# Patient Record
Sex: FEMALE | Race: WHITE | NOT HISPANIC OR LATINO | Employment: OTHER | URBAN - METROPOLITAN AREA
[De-identification: names, ages, dates, MRNs, and addresses within clinical notes are randomized per-mention and may not be internally consistent; named-entity substitution may affect disease eponyms.]

---

## 2016-11-09 LAB — ABNORMAL: NORMAL

## 2017-10-22 ENCOUNTER — TRANSFERRED RECORDS (OUTPATIENT)
Dept: HEALTH INFORMATION MANAGEMENT | Facility: CLINIC | Age: 67
End: 2017-10-22

## 2017-10-25 ENCOUNTER — OFFICE VISIT (OUTPATIENT)
Dept: FAMILY MEDICINE | Facility: CLINIC | Age: 67
End: 2017-10-25

## 2017-10-25 VITALS
WEIGHT: 173 LBS | OXYGEN SATURATION: 97 % | HEIGHT: 67 IN | SYSTOLIC BLOOD PRESSURE: 125 MMHG | HEART RATE: 85 BPM | TEMPERATURE: 97.6 F | BODY MASS INDEX: 27.15 KG/M2 | DIASTOLIC BLOOD PRESSURE: 86 MMHG

## 2017-10-25 DIAGNOSIS — Z23 NEED FOR TETANUS BOOSTER: ICD-10-CM

## 2017-10-25 DIAGNOSIS — Z01.818 PRE-OP EXAM: Primary | ICD-10-CM

## 2017-10-25 PROBLEM — M17.10 PRIMARY OSTEOARTHRITIS OF KNEE: Status: ACTIVE | Noted: 2017-10-25

## 2017-10-25 PROBLEM — E04.1 THYROID NODULE: Status: ACTIVE | Noted: 2017-10-25

## 2017-10-25 PROBLEM — L71.9 ROSACEA: Status: ACTIVE | Noted: 2017-10-25

## 2017-10-25 PROBLEM — J30.2 SEASONAL ALLERGIC RHINITIS: Status: ACTIVE | Noted: 2017-10-25

## 2017-10-25 RX ORDER — LANOLIN ALCOHOL/MO/W.PET/CERES
CREAM (GRAM) TOPICAL DAILY
COMMUNITY
End: 2019-12-11

## 2017-10-25 RX ORDER — CELECOXIB 200 MG/1
CAPSULE ORAL
COMMUNITY
Start: 2017-01-25 | End: 2018-02-21

## 2017-10-25 RX ORDER — TRETINOIN 1 MG/G
CREAM TOPICAL
COMMUNITY
Start: 2017-10-11

## 2017-10-25 RX ORDER — METRONIDAZOLE 10 MG/G
GEL TOPICAL
COMMUNITY

## 2017-10-25 RX ORDER — LORATADINE 10 MG/1
10 TABLET ORAL DAILY
COMMUNITY
End: 2019-12-11

## 2017-10-25 ASSESSMENT — PAIN SCALES - GENERAL: PAINLEVEL: NO PAIN (0)

## 2017-10-25 NOTE — PROGRESS NOTES
[facility]  [address], [address]  [facility] [address]  [phone]  Dept: [phone]    PRE-OP EVALUATION:  Today's date: 10/25/2017    Yanely Jon (: 1950) presents for pre-operative evaluation assessment as requested by Dr. Chris Graham.  She requires evaluation and anesthesia risk assessment prior to undergoing surgery/procedure for treatment of bilateral foot pain. .  Proposed procedure: Bilateral bunionectomy.    Date of Surgery/ Procedure: 17  Time of Surgery/ Procedure: 12:30 p.m.  Hospital/Surgical Facility: [facility]  Fax number for surgical facility: [phone]   Primary Physician: Sherlyn Almanzar  Type of Anesthesia Anticipated: to be determined    Patient has a Health Care Directive or Living Will:  YES     1. NO - Do you have a history of heart attack, stroke, stent, bypass or surgery on an artery in the head, neck, heart or legs?  2. NO - Do you ever have any pain or discomfort in your chest?  3. NO - Do you have a history of  Heart Failure?  4. NO - Are you troubled by shortness of breath when: walking on the level, up a slight hill or at night?  5. NO - Do you currently have a cold, bronchitis or other respiratory infection?  6. NO - Do you have a cough, shortness of breath or wheezing?  7. NO - Do you sometimes get pains in the calves of your legs when you walk?  8. NO - Do you or anyone in your family have previous history of blood clots?  9. NO - Do you or does anyone in your family have a serious bleeding problem such as prolonged bleeding following surgeries or cuts?  10. NO - Have you ever had problems with anemia or been told to take iron pills?  11. NO - Have you had any abnormal blood loss such as black, tarry or bloody stools, or abnormal vaginal bleeding?  12. NO - Have you ever had a blood transfusion?  13. NO - Have you or any of your relatives ever had problems with  anesthesia?   14. NO - Do you have sleep apnea, excessive snoring or daytime drowsiness?  15. NO - Do you have any prosthetic heart valves?  16. NO - Do you have prosthetic joints?  17. NO - Is there any chance that you may be pregnant?    HPI:                                                    Preop  Yanely Jon has a history of bilateral bunions at the 1st metatarsal joints. She is having pain, unable to find shoes that fit. She is to undergo elective bunionectomy. She uses celebrex for pain but was instructed to stop this and use ES tylenol, leading up to her surgery.    She is a healthy individual, without history of heart dx,lung dx, hypertension or DM. Nonsmoker. No hx of sleep apnea. No personal or family history of bleeding or clotting disorders. No personal or family history of intolerance to anesthesia. She has not had any previous complications with surgeries.     MEDICAL HISTORY:                                                      Patient Active Problem List    Diagnosis Date Noted     Thyroid nodule 10/25/2017     Priority: Medium     Primary osteoarthritis of knee 10/25/2017     Priority: Medium     knees       Seasonal allergic rhinitis 10/25/2017     Priority: Medium     Rosacea 10/25/2017     Priority: Medium     Cervicalgia 08/19/2016     Priority: Medium     S/p cortisone injections          Past Surgical History:   Procedure Laterality Date     APPENDECTOMY       CATARACT IOL, RT/LT Right      COLONOSCOPY  2014    repeat in 10 yr     DENTAL SURGERY       MAMMOPLASTY AUGMENTATION Bilateral 1972     TONSILLECTOMY       TUBAL LIGATION       Current Outpatient Prescriptions   Medication Sig Dispense Refill     celecoxib (CELEBREX) 200 MG capsule        metroNIDAZOLE (METROGEL) 1 % gel        tretinoin (RETIN-A) 0.1 % cream Apply QHS to face for rosacea       aspirin 81 MG tablet Take by mouth daily       Omega-3 Fatty Acids (FISH OIL PO)        loratadine (CLARITIN) 10 MG tablet Take 10 mg  "by mouth daily       cyanocobalamin (VITAMIN  B-12) 1000 MCG tablet Take by mouth daily       OTC products: None, except as noted above    Allergies not on file   Latex Allergy: YES: Precautions to take: avoid latex products    Social History   Substance Use Topics     Smoking status: Never Smoker     Smokeless tobacco: Never Used     Alcohol use Yes      Comment: 0-1 per day     History   Drug Use Not on file       REVIEW OF SYSTEMS:                                                    Constitutional, neuro, ENT, endocrine, pulmonary, cardiac, gastrointestinal, genitourinary, musculoskeletal, integument and psychiatric systems are negative, except as otherwise noted.      EXAM:                                                    /86 (BP Location: Left arm, Patient Position: Chair, Cuff Size: Adult Regular)  Pulse 85  Temp 97.6  F (36.4  C) (Oral)  Ht 5' 7\" (170.2 cm)  Wt 173 lb (78.5 kg)  SpO2 97%  BMI 27.1 kg/m2    GENERAL APPEARANCE: healthy, alert and no distress     EYES: EOMI, PERRL     HENT: ear canals and TM's normal and nose and mouth without ulcers or lesions     NECK: no adenopathy, no asymmetry, masses, or scars and thyroid normal to palpation     RESP: lungs clear to auscultation - no rales, rhonchi or wheezes     CV: regular rates and rhythm, normal S1 S2,  no murmur, no bruits     ABDOMEN:  soft, nontender,  bowel sounds normal     MS: extremities normal- no gross deformities noted, no evidence of inflammation in joints, FROM in all extremities.     SKIN: no suspicious lesions or rashes     NEURO: Normal strength and tone, sensory exam grossly normal, mentation intact and speech normal     PSYCH: mentation appears normal. and affect normal/bright     EXT: no edema     Feet: thickening at first metatarsal joints, bony thickening over dorsum of both feet, no redness or warmth, pulses full/ equal    DIAGNOSTICS:                                                    EKG: appears normal, NSR, 83, " normal axis, normal intervals, no acute ST/T changes c/w ischemia, no LVH by voltage criteria, no previous for comparison. Read by Sherlyn Almanzar MD  Internal Medicine/Pediatrics    No additional labs are drawn    IMPRESSION:                                                    (Z01.818) Pre-op exam  (primary encounter diagnosis)  Comment: elective bilateral bunionectomies  Plan: EKG 12-lead complete w/read - Clinics        No contraindication to surgery, proceed as planned    (Z23) Need for tetanus booster  Comment: due for routine booster  Plan: TD (ADULT, 7+) PRESERVE FREE        Update prior to surgery    The proposed surgical procedure is considered INTERMEDIATE risk.    REVISED CARDIAC RISK INDEX  The patient has the following serious cardiovascular risks for perioperative complications such as (MI, PE, VFib and 3  AV Block):  No serious cardiac risks  INTERPRETATION: 0 risks: Class I (very low risk - 0.4% complication rate)    The patient has the following additional risks for perioperative complications:  No identified additional risks    RECOMMENDATIONS:                                                      --Patient is to take all scheduled medications on the day of surgery EXCEPT for modifications listed below.    Anticoagulant or Antiplatelet Medication Use  ASPIRIN: Discontinue ASA 7-10 days prior to procedure to reduce bleeding risk.  It should be resumed post-operatively.  NSAIDS: Celecoxib (Celebrex):    Stop 2-3 days prior to surgery  NO herbals, vitamins, or supplements        APPROVAL GIVEN to proceed with proposed procedure, without further diagnostic evaluation       Signed Electronically by: Sherlyn Almanzar MD    Copy of this evaluation report is provided to requesting physician.    [facility] Preop Guidelines

## 2017-10-25 NOTE — NURSING NOTE
"67 year old  Chief Complaint   Patient presents with     [facility]     Pre Op Exam     11/8/17 with bilateral feet surgery with Dr. Chris Graham at [facility].       Blood pressure 125/86, pulse 85, temperature 97.6  F (36.4  C), temperature source Oral, height 5' 7\" (170.2 cm), weight 173 lb (78.5 kg), SpO2 97 %. Body mass index is 27.1 kg/(m^2).  Patient Active Problem List   Diagnosis   (none) - all problems resolved or deleted       Wt Readings from Last 2 Encounters:   10/25/17 173 lb (78.5 kg)     BP Readings from Last 3 Encounters:   10/25/17 125/86         Current Outpatient Prescriptions   Medication     celecoxib (CELEBREX) 200 MG capsule     metroNIDAZOLE (METROGEL) 1 % gel     tretinoin (RETIN-A) 0.1 % cream     aspirin 81 MG tablet     Omega-3 Fatty Acids (FISH OIL PO)     loratadine (CLARITIN) 10 MG tablet     cyanocobalamin (VITAMIN  B-12) 1000 MCG tablet     No current facility-administered medications for this visit.        Social History   Substance Use Topics     Smoking status: Never Smoker     Smokeless tobacco: Never Used     Alcohol use Yes       Health Maintenance Due   Topic Date Due     TETANUS IMMUNIZATION (SYSTEM ASSIGNED)  05/25/1966     HEPATITIS C SCREENING  05/25/1966     LIPID SCREEN Q5 YR FEMALE (SYSTEM ASSIGNED)  05/25/1993     COLON CANCER SCREEN (SYSTEM ASSIGNED)  05/25/1998     ADVANCE DIRECTIVE PLANNING Q5 YRS  05/25/2003     FALL RISK ASSESSMENT  05/25/2013     DEXA SCAN SCREENING (SYSTEM ASSIGNED)  05/25/2013     PNEUMOCOCCAL (1 of 2 - PCV13) 05/25/2013     INFLUENZA VACCINE (SYSTEM ASSIGNED)  09/01/2017       No results found for: DERICK Foley CMA  October 25, 2017 9:18 AM    Screening Questionnaire for Adult Immunization    Are you sick today?   No   Do you have allergies to medications, food, a vaccine component or latex?   No   Have you ever had a serious reaction after receiving a vaccination?   No   Do you have a long-term health problem with " heart disease, lung disease, asthma, kidney disease, metabolic disease (e.g. diabetes), anemia, or other blood disorder?   No   Do you have cancer, leukemia, HIV/AIDS, or any other immune system problem?   No   In the past 3 months, have you taken medications that affect  your immune system, such as prednisone, other steroids, or anticancer drugs; drugs for the treatment of rheumatoid arthritis, Crohn s disease, or psoriasis; or have you had radiation treatments?   No   Have you had a seizure, or a brain or other nervous system problem?   No   During the past year, have you received a transfusion of blood or blood     products, or been given immune (gamma) globulin or antiviral drug?   No   For women: Are you pregnant or is there a chance you could become        pregnant during the next month?   No   Have you received any vaccinations in the past 4 weeks?   No     Immunization questionnaire answers were all negative.        Per orders of Dr. Almanzar, injection of TD given by Charline Foley. Patient instructed to remain in clinic for 15 minutes afterwards, and to report any adverse reaction to me immediately.       Screening performed by Charline Foley on 10/25/2017 at 10:32 AM.

## 2017-10-25 NOTE — PATIENT INSTRUCTIONS
Hold any vitamin or supplement  No aspirin, ibuprofen, excederin, aleve or celebrex    Tylenol 500mg tablets  Take 2 tablets 3 times a day if needed for pain    High fiber cereal /diet after surgery  Miralax powder, use daily if taking narcotic for pain. (stool softener).

## 2017-10-25 NOTE — MR AVS SNAPSHOT
After Visit Summary   10/25/2017    Yanely Jon    MRN: [id number]           Patient Information     Date Of Birth          1950        Visit Information        Provider Department      10/25/2017 9:20 AM Sherlyn Almanzar MD [facility]        Today's Diagnoses     Pre-op exam    -  1    Need for tetanus booster          Care Instructions    Hold any vitamin or supplement  No aspirin, ibuprofen, excederin, aleve or celebrex    Tylenol 500mg tablets  Take 2 tablets 3 times a day if needed for pain    High fiber cereal /diet after surgery  Miralax powder, use daily if taking narcotic for pain. (stool softener).          Follow-ups after your visit        Who to contact     Please call your clinic at [phone] to:    Ask questions about your health    Make or cancel appointments    Discuss your medicines    Learn about your test results    Speak to your doctor   If you have compliments or concerns about an experience at your clinic, or if you wish to file a complaint, please contact [facility] Patient Relations at [phone] or email us at [email or web address]         Additional Information About Your Visit        MyChart Information     [employer] is an electronic gateway that provides easy, online access to your medical records. With [employer] you can request a clinic appointment, read your test results, renew a prescription or communicate with your care team.     To sign up for [employer] visit the website at [email or web address]   You will be asked to enter the access code listed below, as well as some personal information. Please follow the directions to create your username and password.     Your access code is: [email or web address]  Expires: 2018 10:06 AM     Your access code will  in 90 days. If you need help or a new code, please contact your [facility] or call [phone] for assistance.       "  Care EveryWhere ID     This is your Care EveryWhere ID. This could be used by other organizations to access your [facility] medical records  [id number]        Your Vitals Were     Pulse Temperature Height Pulse Oximetry BMI (Body Mass Index)       85 97.6  F (36.4  C) (Oral) 5' 7\" (170.2 cm) 97% 27.1 kg/m2        Blood Pressure from Last 3 Encounters:   10/25/17 125/86    Weight from Last 3 Encounters:   10/25/17 173 lb (78.5 kg)              We Performed the Following     EKG 12-lead complete w/read - Clinics     TD (ADULT, 7+) PRESERVE FREE        Primary Care Provider Office Phone # Fax #    Sherlyn Almanzar [phone]       [address] [place] [id number]        Equal Access to Services     [facility]: Hadii aad ku hadasho Socoy, waaxda luqadaha, qaybta kaalmada adehollieyadonovan, jayce borden . So [facility] [phone].    ATENCIÓN: Si habla español, tiene a norton disposición servicios gratuitos de asistencia lingüística. Adwoa al [phone].    We comply with applicable federal civil rights laws and [place] laws. We do not discriminate on the basis of race, color, national origin, age, disability, sex, sexual orientation, or gender identity.            Thank you!     Thank you for choosing [facility]  for your care. Our goal is always to provide you with excellent care. Hearing back from our patients is one way we can continue to improve our services. Please take a few minutes to complete the written survey that you may receive in the mail after your visit with us. Thank you!             Your Updated Medication List - Protect others around you: Learn how to safely use, store and throw away your medicines at www.disposemymeds.org.          This list is accurate as of: 10/25/17 10:06 AM.  Always use your most recent med list.                   Brand Name Dispense Instructions for use Diagnosis    aspirin 81 MG tablet      Take by mouth daily        " celecoxib 200 MG capsule    celeBREX          CLARITIN 10 MG tablet   Generic drug:  loratadine      Take 10 mg by mouth daily        cyanocobalamin 1000 MCG tablet    vitamin  B-12     Take by mouth daily        FISH OIL PO           metroNIDAZOLE 1 % gel    METROGEL          tretinoin 0.1 % cream    RETIN-A     Apply QHS to face for rosacea

## 2018-02-19 NOTE — PATIENT INSTRUCTIONS
Benefiber daily     [facility]  [phone]    Call regarding your prescription for estrogen cream      Preventive Health Recommendations  Female Ages 65 +    Yearly exam:     See your health care provider every year in order to  o Review health changes.   o Discuss preventive care.    o Review your medicines if your doctor has prescribed any.      You no longer need a yearly Pap test unless you've had an abnormal Pap test in the past 10 years. If you have vaginal symptoms, such as bleeding or discharge, be sure to talk with your provider about a Pap test.      Every 1 to 2 years, have a mammogram.  If you are over 69, talk with your health care provider about whether or not you want to continue having screening mammograms.      Every 10 years, have a colonoscopy. Or, have a yearly FIT test (stool test). These exams will check for colon cancer.       Have a cholesterol test every 5 years, or more often if your doctor advises it.       Have a diabetes test (fasting glucose) every three years. If you are at risk for diabetes, you should have this test more often.       At age 65, have a bone density scan (DEXA) to check for osteoporosis (brittle bone disease).    Shots:    Get a flu shot each year.    Get a tetanus shot every 10 years.    Talk to your doctor about your pneumonia vaccines. There are now two you should receive - Pneumovax (PPSV 23) and Prevnar (PCV 13).    Talk to your doctor about the shingles vaccine.    Talk to your doctor about the hepatitis B vaccine.    Nutrition:     Eat at least 5 servings of fruits and vegetables each day.      Eat whole-grain bread, whole-wheat pasta and brown rice instead of white grains and rice.      Talk to your provider about Calcium and Vitamin D.     Lifestyle    Exercise at least 150 minutes a week (30 minutes a day, 5 days a week). This will help you control your weight and prevent disease.      Limit alcohol to one drink per day.      No  smoking.       Wear sunscreen to prevent skin cancer.       See your dentist twice a year for an exam and cleaning.      See your eye doctor every 1 to 2 years to screen for conditions such as glaucoma, macular degeneration, cataracts, etc

## 2018-02-19 NOTE — PROGRESS NOTES
Yanely Jon is here for a general check up. She is fasting. She is up to date on eye exams and dental visits. Wears seat belt-yes. Bike helmet- yes.   Concerns today:    BRIAN Chamorro is here for general check up. She is due for routine Hep C screening. Her last colonoscopy was 2014, normal, in [place]  Last DEXA 2013, (In [place])-she is due now. Mammogram, last done 11/9/16. Scattered fibroglandular changes. No suspicious findings.     Immunizations are up to date.     Diet: healthy, veggies, salads, chicken and fish, dairy    Advance directive: Yes, on file  Hearing concerns: No  Fall Risk: None  Independent at home: Yes  Safe : Yes  Memory concerns: No    COGNITIVE SCREEN  1) Repeat 3 items (Banana, Sunrise, Chair)    2) Clock draw: NORMAL  3) 3 item recall: Recalls 3 objects  Results: 3 items recalled: COGNITIVE IMPAIRMENT LESS LIKELY    Mini-CogTM Copyright S [place]. Licensed by the author for use in [facility]; reprinted with permission (emely@[facility]). All rights reserved.      Dysphagia  Sometimes chicken gets stuck in her throat. Will try omeprazole, no belly pain, no real burping. Discussed trial of omeprazole x 6 wks and if sx continue then obtain EGD. She is on celebrex, denies stomach upset.    Osteoarthritis  Uses Celebrex for knee arthritis. She has had cortisone injections. Now, sees [facility], Dr. Mosher for Synvisc injections. Right knee cap dislocate. Takes Celebex 200mg daily. Doing well with that. Sometimes her left wrist hurts. She had cervical disc disease and had an injection last year which helped.          Health Maintenance   Topic Date Due     HEPATITIS C SCREENING  05/25/1966     LIPID SCREEN Q5 YR FEMALE (SYSTEM ASSIGNED)  05/25/1993     COLON CANCER SCREEN (SYSTEM ASSIGNED)  05/25/1998     ADVANCE DIRECTIVE PLANNING Q5 YRS  05/25/2003     DEXA SCAN SCREENING (SYSTEM ASSIGNED)  05/25/2013     FALL RISK ASSESSMENT  10/25/2018     MAMMO SCREEN Q2 YR (SYSTEM ASSIGNED)   11/09/2018     TETANUS IMMUNIZATION (SYSTEM ASSIGNED)  10/25/2027     INFLUENZA VACCINE (SYSTEM ASSIGNED)  Completed     PNEUMOCOCCAL  Completed         Patient Active Problem List   Diagnosis     Cervicalgia     Thyroid nodule     Primary osteoarthritis of knee     Seasonal allergic rhinitis     Rosacea       Past Surgical History:   Procedure Laterality Date     APPENDECTOMY       BUNIONECTOMY Bilateral 10/2017     CATARACT IOL, RT/LT Right      COLONOSCOPY  2014    repeat in 10 yr     DENTAL SURGERY       MAMMOPLASTY AUGMENTATION Bilateral 1972     TONSILLECTOMY       TUBAL LIGATION         Family History   Problem Relation Age of Onset     Osteoarthritis Mother      CEREBROVASCULAR DISEASE Mother 95     OSTEOPOROSIS Mother      Bladder Cancer Father      Breast Cancer Sister 53     HEART DISEASE Brother 50     stents, was smoker     Hyperlipidemia Maternal Aunt      Hyperlipidemia Maternal Aunt        Social    3 children, 9 grandchildren  Retired [employer]    HABITS:  Tob: never  ETOH: 1-2 per week  Calcium: 3-4 per day  Caffeine: 1-2 per day  Exercise: regular program    OB/GYN HISTORY:  LMP: menopause age 38. Had bleeding from fibroids, placed on OCP until menopause  Sexually active, reports vaginal dryness, no bleeding  Hx abnormal pap?  none  STD hx?  none  cycle length:  none  dysmenorrhea/PMS:  none  Vasomotor sx:  none  Contraception: na  G 4   P 3   A 1  Self Breast exam:  yes    Current Outpatient Prescriptions   Medication Sig Dispense Refill     celecoxib (CELEBREX) 200 MG capsule        metroNIDAZOLE (METROGEL) 1 % gel        tretinoin (RETIN-A) 0.1 % cream Apply QHS to face for rosacea       aspirin 81 MG tablet Take by mouth daily       Omega-3 Fatty Acids (FISH OIL PO)        loratadine (CLARITIN) 10 MG tablet Take 10 mg by mouth daily       cyanocobalamin (VITAMIN  B-12) 1000 MCG tablet Take by mouth daily       Allergies   Allergen Reactions     Fentanyl Other (See Comments)     May  "have caused pt. To pass out after cataract      Latex Dermatitis     Methohexital Other (See Comments)     May have caused pt. To pass out after cataract      Midazolam Other (See Comments)     May have caused pt. To pass out after cataract surgery     Seasonal Allergies          ROS  CONSTITUTIONAL:NEGATIVE for fever, chills, change in weight  INTEGUMENTARY/SKIN: NEGATIVE for worrisome rashes, moles or lesions, hx of rosacea, sees dermatologist  EYES: NEGATIVE for vision changes or irritation  ENT/MOUTH: NEGATIVE for ear, mouth and throat problems  RESP:NEGATIVE for significant cough or SOB  BREAST: NEGATIVE for masses, tenderness or discharge, hx of bilateral implants  CV: NEGATIVE for chest pain, palpitations, CERVANTES, orthopnea, PND  or peripheral edema  GI: NEGATIVE for nausea, abdominal pain, heartburn, or change in bowel habits, mild constipation  :NEGATIVE for frequency, dysuria, or hematuria  GYN: vaginal dryness, wishes to try estrogen cream, no vaginal bleeding  MUSCULOSKELETAL: hx of bilateral knee pain, neck pain, wrist pain, on Celebrex  NEURO: NEGATIVE for weakness, dizziness or paresthesias  ENDOCRINE: NEGATIVE for polyuria/dipsia,  temperature intolerance, skin/hair changes  HEME/ALLERGY/IMMUNE: NEGATIVE for bleeding problems  PSYCHIATRIC: NEGATIVE for changes in mood or affect    EXAM  /83 (BP Location: Right arm, Patient Position: Chair, Cuff Size: Adult Regular)  Pulse 92  Temp 97.6  F (36.4  C) (Temporal)  Ht 5' 6.77\" (169.6 cm)  Wt 170 lb (77.1 kg)  SpO2 99%  BMI 26.81 kg/m2  GENERAL APPEARANCE: Alert, pleasant, NAD  EYES: PERRL, EOMI, conjunctiva clear  HENT: TM normal bilaterally. Nose and mouth without lesions  NECK: no adenopathy, thyroid normal to palpation  RESP: lungs clear to auscultation bilaterally,  BREAST: bilateral implants intact, overlying skin normal. No palpable masses, no axillary masses or adenopathy   CV: regular rate and rhythm, normal S1 S2, no murmur, no carotid " bruits  ABDOMEN: soft, nontender, without HSM or masses. Bowel sounds normal  : Not done  RECTAL EXAM: not done   MS: extremities normal- no gross deformities noted, no tender, hot or swollen joints.    SKIN: no suspicious lesions or rashes, fair skinned, freckling  NEURO: Normal strength and tone, sensory exam grossly normal, DTR normoreflexive in upper and lower extremities  PSYCH: mentation appears normal. and affect normal/bright.  EXT: no peripheral edema, pedal pulses palpable    Assessment:  (Z00.00) Routine general medical examination at a health care facility  (primary encounter diagnosis)  Comment: healthy 68 yo woman  Plan: Lipid panel reflex to direct LDL Fasting,         Vitamin D Deficiency, CBC with platelets        Anticipatory guidance given today regarding diet, exercise, calcium intake.      (Z11.59) Need for hepatitis C screening test  Comment: due for screening  Plan: Hepatitis C Screen Reflex to HCV RNA Quant and         Genotype            (M15.8) Other osteoarthritis involving multiple joints  Comment: she sees orthopedic surgeon for synvisc, stable on medicatoin  Plan: celecoxib (CELEBREX) 200 MG capsule        Take with food to reduce stomach upset    (R13.10) Dysphagia, unspecified type  Comment: new onset, denies abdominal pain or GERD  Plan: omeprazole (PRILOSEC) 20 MG CR capsule        Trial of omeprazole daily x 6 wks. If symptoms persist then refer for EGD    (N95.2) Atrophic vaginitis  Comment: she is sexually active, wishes to try estrogen cream compounded, stopped using it due to cost  Plan: ESTRIOL 0.5MG/GM CREAM, ESTRIOL 0.5MG/GM CREAM        rx sent to pharmacy    (Z13.720) Screening for osteoporosis  Comment: due for routine DEXA  Plan: DEXA HIP/PELVIS/SPINE - Future        Discussed calcium and D intake    Sherlyn Almanzar MD  Internal Medicine/Pediatrics

## 2018-02-21 ENCOUNTER — OFFICE VISIT (OUTPATIENT)
Dept: FAMILY MEDICINE | Facility: CLINIC | Age: 68
End: 2018-02-21
Payer: COMMERCIAL

## 2018-02-21 VITALS
TEMPERATURE: 97.6 F | DIASTOLIC BLOOD PRESSURE: 83 MMHG | OXYGEN SATURATION: 99 % | WEIGHT: 170 LBS | SYSTOLIC BLOOD PRESSURE: 126 MMHG | BODY MASS INDEX: 26.68 KG/M2 | HEART RATE: 92 BPM | HEIGHT: 67 IN

## 2018-02-21 DIAGNOSIS — Z00.00 ROUTINE GENERAL MEDICAL EXAMINATION AT A HEALTH CARE FACILITY: Primary | ICD-10-CM

## 2018-02-21 DIAGNOSIS — Z13.820 SCREENING FOR OSTEOPOROSIS: ICD-10-CM

## 2018-02-21 DIAGNOSIS — R13.10 DYSPHAGIA, UNSPECIFIED TYPE: ICD-10-CM

## 2018-02-21 DIAGNOSIS — M15.8 OTHER OSTEOARTHRITIS INVOLVING MULTIPLE JOINTS: ICD-10-CM

## 2018-02-21 DIAGNOSIS — N95.2 ATROPHIC VAGINITIS: ICD-10-CM

## 2018-02-21 DIAGNOSIS — Z11.59 NEED FOR HEPATITIS C SCREENING TEST: ICD-10-CM

## 2018-02-21 LAB
CHOLEST SERPL-MCNC: 230 MG/DL
ERYTHROCYTE [DISTWIDTH] IN BLOOD BY AUTOMATED COUNT: 13.6 % (ref 10–15)
HCT VFR BLD AUTO: 44.4 % (ref 35–47)
HDLC SERPL-MCNC: 80 MG/DL
HGB BLD-MCNC: 14.3 G/DL (ref 11.7–15.7)
LDLC SERPL CALC-MCNC: 139 MG/DL
MCH RBC QN AUTO: 32.1 PG (ref 26.5–33)
MCHC RBC AUTO-ENTMCNC: 32.2 G/DL (ref 31.5–36.5)
MCV RBC AUTO: 100 FL (ref 78–100)
NONHDLC SERPL-MCNC: 150 MG/DL
PLATELET # BLD AUTO: 223 10E9/L (ref 150–450)
RBC # BLD AUTO: 4.45 10E12/L (ref 3.8–5.2)
TRIGL SERPL-MCNC: 50 MG/DL
WBC # BLD AUTO: 5 10E9/L (ref 4–11)

## 2018-02-21 RX ORDER — CELECOXIB 200 MG/1
200 CAPSULE ORAL DAILY
Qty: 90 CAPSULE | Refills: 3 | Status: SHIPPED | OUTPATIENT
Start: 2018-02-21 | End: 2019-02-05

## 2018-02-21 ASSESSMENT — PAIN SCALES - GENERAL: PAINLEVEL: NO PAIN (0)

## 2018-02-21 NOTE — NURSING NOTE
"67 year old  Chief Complaint   Patient presents with     Physical     Annual physical.       Blood pressure 126/83, pulse 92, temperature 97.6  F (36.4  C), temperature source Temporal, height 5' 6.77\" (169.6 cm), weight 170 lb (77.1 kg), SpO2 99 %. Body mass index is 26.81 kg/(m^2).  Patient Active Problem List   Diagnosis     Cervicalgia     Thyroid nodule     Primary osteoarthritis of knee     Seasonal allergic rhinitis     Rosacea       Wt Readings from Last 2 Encounters:   02/21/18 170 lb (77.1 kg)   10/25/17 173 lb (78.5 kg)     BP Readings from Last 3 Encounters:   02/21/18 126/83   10/25/17 125/86         Current Outpatient Prescriptions   Medication     celecoxib (CELEBREX) 200 MG capsule     metroNIDAZOLE (METROGEL) 1 % gel     tretinoin (RETIN-A) 0.1 % cream     aspirin 81 MG tablet     Omega-3 Fatty Acids (FISH OIL PO)     loratadine (CLARITIN) 10 MG tablet     cyanocobalamin (VITAMIN  B-12) 1000 MCG tablet     No current facility-administered medications for this visit.        Social History   Substance Use Topics     Smoking status: Never Smoker     Smokeless tobacco: Never Used     Alcohol use Yes      Comment: 0-1 per day       Health Maintenance Due   Topic Date Due     HEPATITIS C SCREENING  05/25/1966     LIPID SCREEN Q5 YR FEMALE (SYSTEM ASSIGNED)  05/25/1993     COLON CANCER SCREEN (SYSTEM ASSIGNED)  05/25/1998     ADVANCE DIRECTIVE PLANNING Q5 YRS  05/25/2003     DEXA SCAN SCREENING (SYSTEM ASSIGNED)  05/25/2013       No results found for: DERICK Zhang MA  February 21, 2018 7:58 AM    "

## 2018-02-21 NOTE — MR AVS SNAPSHOT
After Visit Summary   2/21/2018    Yanely Jon    MRN: [id number]           Patient Information     Date Of Birth          1950        Visit Information        Provider Department      2/21/2018 8:00 AM Sherlyn Almanzar MD [facility]        Today's Diagnoses     Routine general medical examination at a health care facility    -  1    Asymptomatic postmenopausal status        Need for hepatitis C screening test        Other osteoarthritis involving multiple joints        Dysphagia, unspecified type          Care Instructions    Benefiber daily     [facility]  [facility]  [phone]    Call regarding your prescription for estrogen cream      Preventive Health Recommendations  Female Ages 65 +    Yearly exam:     See your health care provider every year in order to  o Review health changes.   o Discuss preventive care.    o Review your medicines if your doctor has prescribed any.      You no longer need a yearly Pap test unless you've had an abnormal Pap test in the past 10 years. If you have vaginal symptoms, such as bleeding or discharge, be sure to talk with your provider about a Pap test.      Every 1 to 2 years, have a mammogram.  If you are over 69, talk with your health care provider about whether or not you want to continue having screening mammograms.      Every 10 years, have a colonoscopy. Or, have a yearly FIT test (stool test). These exams will check for colon cancer.       Have a cholesterol test every 5 years, or more often if your doctor advises it.       Have a diabetes test (fasting glucose) every three years. If you are at risk for diabetes, you should have this test more often.       At age 65, have a bone density scan (DEXA) to check for osteoporosis (brittle bone disease).    Shots:    Get a flu shot each year.    Get a tetanus shot every 10 years.    Talk to your doctor about your pneumonia vaccines. There are now two you should receive -  Pneumovax (PPSV 23) and Prevnar (PCV 13).    Talk to your doctor about the shingles vaccine.    Talk to your doctor about the hepatitis B vaccine.    Nutrition:     Eat at least 5 servings of fruits and vegetables each day.      Eat whole-grain bread, whole-wheat pasta and brown rice instead of white grains and rice.      Talk to your provider about Calcium and Vitamin D.     Lifestyle    Exercise at least 150 minutes a week (30 minutes a day, 5 days a week). This will help you control your weight and prevent disease.      Limit alcohol to one drink per day.      No smoking.       Wear sunscreen to prevent skin cancer.       See your dentist twice a year for an exam and cleaning.      See your eye doctor every 1 to 2 years to screen for conditions such as glaucoma, macular degeneration, cataracts, etc           Follow-ups after your visit        Future tests that were ordered for you today     Open Future Orders        Priority Expected Expires Ordered    DEXA HIP/PELVIS/SPINE - Future Routine  2019            Who to contact     Please call your clinic at [phone] to:    Ask questions about your health    Make or cancel appointments    Discuss your medicines    Learn about your test results    Speak to your doctor            Additional Information About Your Visit        Cardiac Systemzhart Information     BackerKitt is an electronic gateway that provides easy, online access to your medical records. With [email or web address], you can request a clinic appointment, read your test results, renew a prescription or communicate with your care team.     To sign up for BackerKitt visit the website at [email or web address]   You will be asked to enter the access code listed below, as well as some personal information. Please follow the directions to create your username and password.     Your access code is: [email or web address]  Expires: 2018  8:49 AM     Your access code will  in 90 days. If you need help or a new code,  "please contact your [facility] or call [phone] for assistance.        Care EveryWhere ID     This is your Care EveryWhere ID. This could be used by other organizations to access your [facility] medical records  [id number]        Your Vitals Were     Pulse Temperature Height Pulse Oximetry BMI (Body Mass Index)       92 97.6  F (36.4  C) (Temporal) 5' 6.77\" (169.6 cm) 99% 26.81 kg/m2        Blood Pressure from Last 3 Encounters:   02/21/18 126/83   10/25/17 125/86    Weight from Last 3 Encounters:   02/21/18 170 lb (77.1 kg)   10/25/17 173 lb (78.5 kg)              We Performed the Following     CBC with platelets     Hepatitis C Screen Reflex to HCV RNA Quant and Genotype     Lipid panel reflex to direct LDL Fasting     Vitamin D Deficiency          Today's Medication Changes          These changes are accurate as of 2/21/18  8:49 AM.  If you have any questions, ask your nurse or doctor.               Start taking these medicines.        Dose/Directions    omeprazole 20 MG CR capsule   Commonly known as:  priLOSEC   Used for:  Dysphagia, unspecified type   Started by:  Sherlyn Almanzar MD        Dose:  20 mg   Take 1 capsule (20 mg) by mouth daily   Quantity:  90 capsule   Refills:  0         These medicines have changed or have updated prescriptions.        Dose/Directions    celecoxib 200 MG capsule   Commonly known as:  celeBREX   This may have changed:    - how much to take  - how to take this  - when to take this   Used for:  Other osteoarthritis involving multiple joints   Changed by:  Sherlyn Almanzar MD        Dose:  200 mg   Take 1 capsule (200 mg) by mouth daily   Quantity:  90 capsule   Refills:  3            Where to get your medicines      These medications were sent to [facility] - [place], [place] - [address]  [address] [facility] [address]     Phone:  [phone]     celecoxib 200 MG capsule    omeprazole 20 MG CR " capsule                Primary Care Provider Office Phone # Fax #    Sherlyn Almanzar [phone] [address] [place] [address]        Equal Access to Services     EILEEN GARCIA : Hadii aad ku hadgeorge Clement, wajacintoda luqadaha, qaybta kahemanthda campbell, jayce estrella michi potter. So [facility] [phone].    ATENCIÓN: Si habla español, tiene a norton disposición servicios gratuitos de asistencia lingüística. Llame al [phone].    We comply with applicable federal civil rights laws and [place] laws. We do not discriminate on the basis of race, color, national origin, age, disability, sex, sexual orientation, or gender identity.            Thank you!     Thank you for choosing [facility]  for your care. Our goal is always to provide you with excellent care. Hearing back from our patients is one way we can continue to improve our services. Please take a few minutes to complete the written survey that you may receive in the mail after your visit with us. Thank you!             Your Updated Medication List - Protect others around you: Learn how to safely use, store and throw away your medicines at www.disposemymeds.org.          This list is accurate as of 2/21/18  8:49 AM.  Always use your most recent med list.                   Brand Name Dispense Instructions for use Diagnosis    aspirin 81 MG tablet      Take by mouth daily        celecoxib 200 MG capsule    celeBREX    90 capsule    Take 1 capsule (200 mg) by mouth daily    Other osteoarthritis involving multiple joints       CLARITIN 10 MG tablet   Generic drug:  loratadine      Take 10 mg by mouth daily        cyanocobalamin 1000 MCG tablet    vitamin  B-12     Take by mouth daily        FISH OIL PO           metroNIDAZOLE 1 % gel    METROGEL          omeprazole 20 MG CR capsule    priLOSEC    90 capsule    Take 1 capsule (20 mg) by mouth daily    Dysphagia, unspecified type       tretinoin 0.1 % cream     RETIN-A     Apply QHS to face for rosacea

## 2018-02-22 LAB
DEPRECATED CALCIDIOL+CALCIFEROL SERPL-MC: 41 UG/L (ref 20–75)
HCV AB SERPL QL IA: NONREACTIVE

## 2018-03-21 DIAGNOSIS — I83.893 SYMPTOMATIC VARICOSE VEINS OF BOTH LOWER EXTREMITIES: ICD-10-CM

## 2018-05-21 DIAGNOSIS — R13.10 DYSPHAGIA, UNSPECIFIED TYPE: ICD-10-CM

## 2018-05-21 NOTE — TELEPHONE ENCOUNTER
Prescription approved per [facility] Refill Protocol.      Yoanna El RN  May 21, 2018 11:51 AM

## 2018-08-09 ENCOUNTER — TRANSFERRED RECORDS (OUTPATIENT)
Dept: HEALTH INFORMATION MANAGEMENT | Facility: CLINIC | Age: 68
End: 2018-08-09

## 2018-08-16 ENCOUNTER — TRANSFERRED RECORDS (OUTPATIENT)
Dept: HEALTH INFORMATION MANAGEMENT | Facility: CLINIC | Age: 68
End: 2018-08-16

## 2018-08-20 ENCOUNTER — TRANSFERRED RECORDS (OUTPATIENT)
Dept: HEALTH INFORMATION MANAGEMENT | Facility: CLINIC | Age: 68
End: 2018-08-20

## 2018-09-05 ENCOUNTER — TRANSFERRED RECORDS (OUTPATIENT)
Dept: HEALTH INFORMATION MANAGEMENT | Facility: CLINIC | Age: 68
End: 2018-09-05

## 2018-09-12 ENCOUNTER — ALLIED HEALTH/NURSE VISIT (OUTPATIENT)
Dept: FAMILY MEDICINE | Facility: CLINIC | Age: 68
End: 2018-09-12
Payer: COMMERCIAL

## 2018-09-12 DIAGNOSIS — Z23 IMMUNIZATION DUE: Primary | ICD-10-CM

## 2018-09-12 NOTE — MR AVS SNAPSHOT
After Visit Summary   9/12/2018    Yanely Jon    MRN: [id number]           Patient Information     Date Of Birth          1950        Visit Information        Provider Department      9/12/2018 2:45 PM Nurse, [facility]        Today's Diagnoses     Immunization due    -  1       Follow-ups after your visit        Who to contact     Please call your clinic at [phone] to:    Ask questions about your health    Make or cancel appointments    Discuss your medicines    Learn about your test results    Speak to your doctor            Additional Information About Your Visit        MyChart Information     Apisphere gives you secure access to your electronic health record. If you see a primary care provider, you can also send messages to your care team and make appointments. If you have questions, please call your primary care clinic.  If you do not have a primary care provider, please call [phone] and they will assist you.      Apisphere is an electronic gateway that provides easy, online access to your medical records. With Apisphere, you can request a clinic appointment, read your test results, renew a prescription or communicate with your care team.     To access your existing account, please contact your [facility] or call [phone] for assistance.        Care EveryWhere ID     This is your Care EveryWhere ID. This could be used by other organizations to access your [facility] medical records  [id number]         Blood Pressure from Last 3 Encounters:   02/21/18 126/83   10/25/17 125/86    Weight from Last 3 Encounters:   02/21/18 170 lb (77.1 kg)   10/25/17 173 lb (78.5 kg)              We Performed the Following     HEPA VACCINE ADULT IM     VACCINE ADMINISTRATION, INITIAL        Primary Care Provider Office Phone # Fax #    Sherlyn Almanzar [phone] [address] [place] [address]        Equal Access  to Services     COLLETTE GARCIA : Hadii andres Clement, wamatthew bass, qajosephed kaalmajayce cordero. So [facility] [phone].    ATENCIÓN: Si habla ajit, tiene a norton disposición servicios gratuitos de asistencia lingüística. Llame al [phone].    We comply with applicable federal civil rights laws and [place] laws. We do not discriminate on the basis of race, color, national origin, age, disability, sex, sexual orientation, or gender identity.            Thank you!     Thank you for choosing [facility]  for your care. Our goal is always to provide you with excellent care. Hearing back from our patients is one way we can continue to improve our services. Please take a few minutes to complete the written survey that you may receive in the mail after your visit with us. Thank you!             Your Updated Medication List - Protect others around you: Learn how to safely use, store and throw away your medicines at www.disposemymeds.org.          This list is accurate as of 9/12/18  3:31 PM.  Always use your most recent med list.                   Brand Name Dispense Instructions for use Diagnosis    aspirin 81 MG tablet      Take by mouth daily        celecoxib 200 MG capsule    celeBREX    90 capsule    Take 1 capsule (200 mg) by mouth daily    Other osteoarthritis involving multiple joints       CLARITIN 10 MG tablet   Generic drug:  loratadine      Take 10 mg by mouth daily        cyanocobalamin 1000 MCG tablet    vitamin  B-12     Take by mouth daily        * ESTRIOL 0.5MG/GM CREAM     30 g    Place vaginally three times a week Insert 1 gram vaginally 2-3 times per week.    Atrophic vaginitis       * ESTRIOL 0.5MG/GM CREAM     30 g    Insert 1 gram vaginally daily x 7 days, then 3 times a week    Atrophic vaginitis       FISH OIL PO           metroNIDAZOLE 1 % gel    METROGEL          omeprazole 20 MG CR capsule    priLOSEC    90 capsule    Take 1 capsule (20  mg) by mouth daily    Dysphagia, unspecified type       tretinoin 0.1 % cream    RETIN-A     Apply QHS to face for rosacea        * Notice:  This list has 2 medication(s) that are the same as other medications prescribed for you. Read the directions carefully, and ask your doctor or other care provider to review them with you.

## 2018-09-12 NOTE — NURSING NOTE
Screening Questionnaire for Adult Immunization    Are you sick today?   No   Do you have allergies to medications, food, a vaccine component or latex?   No   Have you ever had a serious reaction after receiving a vaccination?   No   Do you have a long-term health problem with heart disease, lung disease, asthma, kidney disease, metabolic disease (e.g. diabetes), anemia, or other blood disorder?   No   Do you have cancer, leukemia, HIV/AIDS, or any other immune system problem?   No   In the past 3 months, have you taken medications that affect  your immune system, such as prednisone, other steroids, or anticancer drugs; drugs for the treatment of rheumatoid arthritis, Crohn s disease, or psoriasis; or have you had radiation treatments?   No   Have you had a seizure, or a brain or other nervous system problem?   No   During the past year, have you received a transfusion of blood or blood     products, or been given immune (gamma) globulin or antiviral drug?   No   For women: Are you pregnant or is there a chance you could become        pregnant during the next month?   No   Have you received any vaccinations in the past 4 weeks?   No     Immunization questionnaire answers were all negative.        Per orders of Dr. Márquez, injection of Hep A given by Julissa Zhang. Patient instructed to remain in clinic for 15 minutes afterwards, and to report any adverse reaction to me immediately.       Screening performed by Julissa Zhang on 9/12/2018 at 3:31 PM.

## 2018-09-18 ENCOUNTER — TRANSFERRED RECORDS (OUTPATIENT)
Dept: HEALTH INFORMATION MANAGEMENT | Facility: CLINIC | Age: 68
End: 2018-09-18

## 2018-10-28 ENCOUNTER — HOSPITAL ENCOUNTER (EMERGENCY)
Facility: CLINIC | Age: 68
Discharge: HOME | End: 2018-10-28
Attending: EMERGENCY MEDICINE | Admitting: EMERGENCY MEDICINE
Payer: MEDICARE

## 2018-10-28 VITALS
SYSTOLIC BLOOD PRESSURE: 129 MMHG | RESPIRATION RATE: 21 BRPM | OXYGEN SATURATION: 99 % | TEMPERATURE: 97.7 F | DIASTOLIC BLOOD PRESSURE: 81 MMHG

## 2018-10-28 DIAGNOSIS — R55 SYNCOPE, UNSPECIFIED SYNCOPE TYPE: ICD-10-CM

## 2018-10-28 LAB
ANION GAP SERPL CALCULATED.3IONS-SCNC: 6 MMOL/L (ref 3–14)
BASOPHILS # BLD AUTO: 0 10E9/L (ref 0–0.2)
BASOPHILS NFR BLD AUTO: 0.1 %
BUN SERPL-MCNC: 20 MG/DL (ref 7–30)
CALCIUM SERPL-MCNC: 8.4 MG/DL (ref 8.5–10.1)
CHLORIDE SERPL-SCNC: 110 MMOL/L (ref 94–109)
CO2 SERPL-SCNC: 25 MMOL/L (ref 20–32)
CREAT SERPL-MCNC: 0.66 MG/DL (ref 0.52–1.04)
DIFFERENTIAL METHOD BLD: ABNORMAL
EOSINOPHIL # BLD AUTO: 0 10E9/L (ref 0–0.7)
EOSINOPHIL NFR BLD AUTO: 0.6 %
ERYTHROCYTE [DISTWIDTH] IN BLOOD BY AUTOMATED COUNT: 13.3 % (ref 10–15)
GFR SERPL CREATININE-BSD FRML MDRD: 88 ML/MIN/1.7M2
GLUCOSE SERPL-MCNC: 127 MG/DL (ref 70–99)
HCT VFR BLD AUTO: 44.9 % (ref 35–47)
HGB BLD-MCNC: 14.4 G/DL (ref 11.7–15.7)
IMM GRANULOCYTES # BLD: 0 10E9/L (ref 0–0.4)
IMM GRANULOCYTES NFR BLD: 0.1 %
LYMPHOCYTES # BLD AUTO: 0.7 10E9/L (ref 0.8–5.3)
LYMPHOCYTES NFR BLD AUTO: 9.7 %
MCH RBC QN AUTO: 32.3 PG (ref 26.5–33)
MCHC RBC AUTO-ENTMCNC: 32.1 G/DL (ref 31.5–36.5)
MCV RBC AUTO: 101 FL (ref 78–100)
MONOCYTES # BLD AUTO: 0.4 10E9/L (ref 0–1.3)
MONOCYTES NFR BLD AUTO: 6.1 %
NEUTROPHILS # BLD AUTO: 5.6 10E9/L (ref 1.6–8.3)
NEUTROPHILS NFR BLD AUTO: 83.4 %
NRBC # BLD AUTO: 0 10*3/UL
NRBC BLD AUTO-RTO: 0 /100
PLATELET # BLD AUTO: 197 10E9/L (ref 150–450)
POTASSIUM SERPL-SCNC: 4.1 MMOL/L (ref 3.4–5.3)
RBC # BLD AUTO: 4.46 10E12/L (ref 3.8–5.2)
SODIUM SERPL-SCNC: 141 MMOL/L (ref 133–144)
TROPONIN I SERPL-MCNC: <0.015 UG/L (ref 0–0.04)
WBC # BLD AUTO: 6.8 10E9/L (ref 4–11)

## 2018-10-28 PROCEDURE — 96360 HYDRATION IV INFUSION INIT: CPT | Performed by: EMERGENCY MEDICINE

## 2018-10-28 PROCEDURE — 99284 EMERGENCY DEPT VISIT MOD MDM: CPT | Mod: 25 | Performed by: EMERGENCY MEDICINE

## 2018-10-28 PROCEDURE — 93010 ELECTROCARDIOGRAM REPORT: CPT | Mod: Z6 | Performed by: EMERGENCY MEDICINE

## 2018-10-28 PROCEDURE — 93005 ELECTROCARDIOGRAM TRACING: CPT | Performed by: EMERGENCY MEDICINE

## 2018-10-28 PROCEDURE — 25000128 H RX IP 250 OP 636: Performed by: EMERGENCY MEDICINE

## 2018-10-28 PROCEDURE — 85025 COMPLETE CBC W/AUTO DIFF WBC: CPT | Performed by: EMERGENCY MEDICINE

## 2018-10-28 PROCEDURE — 84484 ASSAY OF TROPONIN QUANT: CPT | Performed by: EMERGENCY MEDICINE

## 2018-10-28 PROCEDURE — 80048 BASIC METABOLIC PNL TOTAL CA: CPT | Performed by: EMERGENCY MEDICINE

## 2018-10-28 RX ADMIN — SODIUM CHLORIDE 1000 ML: 9 INJECTION, SOLUTION INTRAVENOUS at 11:05

## 2018-10-28 ASSESSMENT — ENCOUNTER SYMPTOMS
BLOOD IN STOOL: 0
SHORTNESS OF BREATH: 0
NUMBNESS: 0
HEMATURIA: 0
WEAKNESS: 0
DIFFICULTY URINATING: 0

## 2018-10-28 NOTE — ED PROVIDER NOTES
[place] EMERGENCY DEPARTMENT ([facility])  October 28, 2018    History     Chief Complaint   Patient presents with     Syncope     Fainted twice. EMS document/witnessed orthostatic hypo-B/P     HPI  Yanely Jon is a 68 year old female who was brought in by ambulance to the ED after 2 episodes of syncope.  Patient states that she was at her [employer] basketball game when she felt her hands get red and itchy.  She then started to feel hot and clammy, so she sat down and had her first episode of syncope.  Then when she felt a bit better, she tried to stand up from sitting and started to have the same symptoms of feeling hot and clammy again. She then had her second episode of syncope.  Her  witnessed this and denies any falls, injuries, or seizure-like activity.  EMS reported that her heart rate was in the 80s-90s.  She reports she did eat a protein bar for breakfast and has eaten these protein bars many times before.  She states that she fainted once previously after cataract surgery, but was told that this might be due to the medication that they gave her.  She otherwise currently denies any blood in her stools, difficulty urinating, hematuria, chest pain, shortness of breath, numbness or weakness of any of her extremities, or recent changes in her medications.      PAST MEDICAL HISTORY  Past Medical History:   Diagnosis Date     Acrochordon      Atypical chest pain     Normal stress test in 1999, performed for atypical chest pain     Benign nevus of skin     Numerous benign skin nevi     Biceps tendonitis      Bilateral bunions      Breast implant leak     Breast implants complicated by a leak. She then underwent replacement and repair.      Chronic fatigue      H/O urinary incontinence     Mild occasional female urinary incontinence which seems to be both urge and stress related.     History of appendectomy      History of bilateral cataract extraction      Hx of tonsillectomy      Hx  of tubal ligation      Left hip pain     Occasional left hip pain due to osteoarthritis and/or trochanteric bursitis.     Lipoma of back 2014    Chronic lipoma of the left back, status post-surgical resection in January 2014     Low back pain     Occasional low back pain with radicular symptoms into the legs.     Osteoarthritis of knees, bilateral      Osteoarthritis of left hip      Osteopenia     DEXA scan revealing osteopenia in September 2013     Postmenopausal status      Pubic bone pain     Musculoskeletal pubic bone pain due to a strain.     Rosacea      Seborrheic keratoses      Skin lesions     Atypical melanocytic lesions, status post multiple biopsies by Dr. Rivera in July of 2015. These biposies showed a junctional nevus with moderate atypia, which is also a dysplastic nevus and the final one was also a compound nevus, with mild atypia which is also a dysplastic nevus.  She was contacted by Dr. Rivera and was told to follow up with her again in July 2016, for re-evaluation of her skin.     Skin tag      Solar elastosis      Solar lentiginosis      Sternal fracture 2015    Mid-sternal fracture after blunt trauma while falling while running up stepsin May 2015. She may have also had some rib fractures as well. These healed with conservative management.     Symptomatic varicose veins of both lower extremities 2015    Lower extremity venous varicosities, intermittently symptomatic, status post evaluation with Dr. Alonso in General Surgery in the summer of 2015.     Symptomatic varicose veins of both lower extremities     Status post injection and compression sclerotherapy     Urticaria     Questionable exercise-induced urticaria     PAST SURGICAL HISTORY  Past Surgical History:   Procedure Laterality Date     APPENDECTOMY       BUNIONECTOMY Bilateral 10/2017     CATARACT IOL, RT/LT Right      COLONOSCOPY  2014    repeat in 10 yr     COSMETIC SURGERY  01/23/2014    neck     DENTAL SURGERY       MAMMOPLASTY  AUGMENTATION Bilateral 1972     SCLEROTHERAPY  10/03/2014    varicose veins     SURGICAL PATHOLOGY EXAM  01/13/2014    lower back     TONSILLECTOMY       TUBAL LIGATION       FAMILY HISTORY  Family History   Problem Relation Age of Onset     Osteoarthritis Mother      Cerebrovascular Disease Mother 95     Osteoporosis Mother      Bladder Cancer Father      Breast Cancer Sister 53     HEART DISEASE Brother 50     stents, was smoker     Hyperlipidemia Maternal Aunt      Hyperlipidemia Maternal Aunt      SOCIAL HISTORY  Social History   Substance Use Topics     Smoking status: Never Smoker     Smokeless tobacco: Never Used     Alcohol use Yes      Comment: 0-1 per day     MEDICATIONS  No current facility-administered medications for this encounter.      Current Outpatient Prescriptions   Medication     aspirin 81 MG tablet     celecoxib (CELEBREX) 200 MG capsule     cyanocobalamin (VITAMIN  B-12) 1000 MCG tablet     ESTRIOL 0.5MG/GM CREAM     ESTRIOL 0.5MG/GM CREAM     loratadine (CLARITIN) 10 MG tablet     metroNIDAZOLE (METROGEL) 1 % gel     Omega-3 Fatty Acids (FISH OIL PO)     omeprazole (PRILOSEC) 20 MG CR capsule     tretinoin (RETIN-A) 0.1 % cream     ALLERGIES  Allergies   Allergen Reactions     Cheese      Fentanyl Other (See Comments)     May have caused pt. To pass out after cataract      Latex Dermatitis     Methohexital Other (See Comments)     May have caused pt. To pass out after cataract      Midazolam Other (See Comments)     May have caused pt. To pass out after cataract surgery     Seasonal Allergies        I have reviewed the Medications, Allergies, Past Medical and Surgical History, and Social History in the Epic system.    Review of Systems   Respiratory: Negative for shortness of breath.    Cardiovascular: Negative for chest pain.   Gastrointestinal: Negative for blood in stool.   Genitourinary: Negative for difficulty urinating and hematuria.   Neurological: Positive for syncope (x2). Negative  for weakness and numbness.   All other systems reviewed and are negative.      Physical Exam   BP: 106/67  Heart Rate: 80  Temp: 97.7  F (36.5  C)  Resp: 14  SpO2: 94 %  Lying Orthostatic BP: 124/89  Lying Orthostatic Pulse: 84 bpm  Sitting Orthostatic BP: 127/76  Sitting Orthostatic Pulse: 93 bpm  Standing Orthostatic BP: 115/84  Standing Orthostatic Pulse: 93 bpm      Physical Exam   Constitutional: She is oriented to person, place, and time. Vital signs are normal. She appears well-developed and well-nourished.  Non-toxic appearance. She does not appear ill. No distress.   HENT:   Head: Normocephalic and atraumatic.   Mouth/Throat: Oropharynx is clear and moist. No oropharyngeal exudate.   Eyes: Conjunctivae and EOM are normal. Pupils are equal, round, and reactive to light. No scleral icterus.   Neck: Normal range of motion. Neck supple. No JVD present. No tracheal deviation present. No thyromegaly present.   Cardiovascular: Normal rate, regular rhythm, normal heart sounds and intact distal pulses.  Exam reveals no gallop and no friction rub.    No murmur heard.  Pulmonary/Chest: Effort normal and breath sounds normal. No respiratory distress.   Abdominal: Soft. Bowel sounds are normal. She exhibits no distension and no mass. There is no tenderness. There is no rebound and no guarding.   Musculoskeletal: Normal range of motion. She exhibits no edema or tenderness.   Lymphadenopathy:     She has no cervical adenopathy.   Neurological: She is alert and oriented to person, place, and time. She has normal strength. No cranial nerve deficit or sensory deficit.   Skin: Skin is warm and dry. No rash noted. No erythema. No pallor.   Psychiatric: She has a normal mood and affect. Her behavior is normal.   Nursing note and vitals reviewed.      ED Course     ED Course     Procedures   10:14 AM  The patient was seen and examined by Dr. Maria in Room 18.                EKG Interpretation:      Interpreted by Jacinto  [facility]    Symptoms at time of EKG: syncope   Rhythm: normal sinus   Rate: 87  Ectopy: none  Conduction: normal  ST Segments/ T Waves: No ST-T wave changes  Q Waves: none  Comparison to prior: Unchanged    Clinical Impression: normal EKG      Results for orders placed or performed during the hospital encounter of 10/28/18   CBC with platelets differential   Result Value Ref Range    WBC 6.8 4.0 - 11.0 10e9/L    RBC Count 4.46 3.8 - 5.2 10e12/L    Hemoglobin 14.4 11.7 - 15.7 g/dL    Hematocrit 44.9 35.0 - 47.0 %     (H) 78 - 100 fl    MCH 32.3 26.5 - 33.0 pg    MCHC 32.1 31.5 - 36.5 g/dL    RDW 13.3 10.0 - 15.0 %    Platelet Count 197 150 - 450 10e9/L    Diff Method Automated Method     % Neutrophils 83.4 %    % Lymphocytes 9.7 %    % Monocytes 6.1 %    % Eosinophils 0.6 %    % Basophils 0.1 %    % Immature Granulocytes 0.1 %    Nucleated RBCs 0 0 /100    Absolute Neutrophil 5.6 1.6 - 8.3 10e9/L    Absolute Lymphocytes 0.7 (L) 0.8 - 5.3 10e9/L    Absolute Monocytes 0.4 0.0 - 1.3 10e9/L    Absolute Eosinophils 0.0 0.0 - 0.7 10e9/L    Absolute Basophils 0.0 0.0 - 0.2 10e9/L    Abs Immature Granulocytes 0.0 0 - 0.4 10e9/L    Absolute Nucleated RBC 0.0    Basic metabolic panel   Result Value Ref Range    Sodium 141 133 - 144 mmol/L    Potassium 4.1 3.4 - 5.3 mmol/L    Chloride 110 (H) 94 - 109 mmol/L    Carbon Dioxide 25 20 - 32 mmol/L    Anion Gap 6 3 - 14 mmol/L    Glucose 127 (H) 70 - 99 mg/dL    Urea Nitrogen 20 7 - 30 mg/dL    Creatinine 0.66 0.52 - 1.04 mg/dL    GFR Estimate 88 >60 mL/min/1.7m2    GFR Estimate If Black >90 >60 mL/min/1.7m2    Calcium 8.4 (L) 8.5 - 10.1 mg/dL   Troponin I   Result Value Ref Range    Troponin I ES <0.015 0.000 - 0.045 ug/L   EKG 12 lead   Result Value Ref Range    Interpretation ECG Click View Image link to view waveform and result             Assessments & Plan (with Medical Decision Making)       This patient presented Emergency Department after suffering a syncopal  episode. Her 12-lead EKG demonstrates no AV block, no evidence of Brugada syndrome, prolonged QT interval or other arrhythmogenic findings. There did not appear to be a significant difference between this EKG and old EKG and there were no ischemic changes. Hemoglobin is within normal limits decreasing suspicion for anemia as cause. She has no headache, chest pain or other symptoms that would suggest this is secondary to subarachnoid hemorrhage, aortic pathology and with a normal neurologic exam is decreasing suspicion for stroke. Given the gradual onset of symptoms and the prodromal symptoms that the prompted the patient to lie down, I suspect that this was more than likely vasovagal. She was hydrated with a liter of IV fluid here in the Emergency Department and orthostatic vital signs were within normal limits. She was able to ambulate around the Emergency Department without symptoms and that reported feeling well. At this point in time I am comfortable discharging her, and I have discussed my findings and suspicions with the patient and her family. Questions were answered. We did discuss one option would be admission for period of observation versus home and follow-up with her clinic. She is comfortable going home but was urged to return should she have any other symptoms have another episode. She was discharged in good condition.    This part of the medical record was transcribed by Donna Guan, Medical Scribe, from a dictation done by Jacinto Maria MD.     I have reviewed the nursing notes.    I have reviewed the findings, diagnosis, plan and need for follow up with the patient.    Discharge Medication List as of 10/28/2018 12:23 PM          Final diagnoses:   Syncope, unspecified syncope type     I, James Booth, qing serving as a trained medical scribe to document services personally performed by Jacinto Maria MD, based on the provider's statements to me.      I, Jacinto Maria MD, was physically  present and have reviewed and verified the accuracy of this note documented by James Booth.     10/28/2018   [facility], [place], EMERGENCY DEPARTMENT     Jacinto Maria MD  10/28/18 [id number]

## 2018-10-28 NOTE — ED TRIAGE NOTES
Pt had a syncopal episode while at a sporting event this morning. Pt felt dizzy and ask for a water from friend.  EMS called.EMS witnessed orthostatic changes on the scene of 20 points sitting to standing. EMS convinced Pt to come to the E.R.

## 2018-10-28 NOTE — ED AVS SNAPSHOT
[facility], Emergency Department    [address] [address]    Phone:  [phone]                                       Yanely Jon   MRN: [id number]    Department:  [facility], Emergency Department   Date of Visit:  10/28/2018           Patient Information     Date Of Birth          1950        Your diagnoses for this visit were:     Syncope, unspecified syncope type        You were seen by Jacinto Maria MD.        Discharge Instructions       Please make an appointment to follow up with Your Primary Care Provider as soon as possible.    Rest and stay well-hydrated today.  Eat regular meals.    Return to the emergency department for any problems.      Discharge References/Attachments     SYNCOPE, VASOVAGAL (ENGLISH)    VASOVAGAL SYNCOPE, UNDERSTANDING (ENGLISH)      24 Hour Appointment Hotline       To make an appointment at any [facility] clinic, call [phone] ([phone]). If you don't have a family doctor or clinic, we will help you find one. [facility] clinics are conveniently located to serve the needs of you and your family.             Review of your medicines      Our records show that you are taking the medicines listed below. If these are incorrect, please call your family doctor or clinic.        Dose / Directions Last dose taken    aspirin 81 MG tablet        Take by mouth daily   Refills:  0        celecoxib 200 MG capsule   Commonly known as:  celeBREX   Dose:  200 mg   Quantity:  90 capsule        Take 1 capsule (200 mg) by mouth daily   Refills:  3        CLARITIN 10 MG tablet   Dose:  10 mg   Generic drug:  loratadine        Take 10 mg by mouth daily   Refills:  0        cyanocobalamin 1000 MCG tablet   Commonly known as:  vitamin  B-12        Take by mouth daily   Refills:  0        * ESTRIOL 0.5MG/GM CREAM   Quantity:  30 g        Place vaginally three times a week Insert 1 gram vaginally 2-3 times per week.   Refills:  11        * ESTRIOL  0.5MG/GM CREAM   Quantity:  30 g        Insert 1 gram vaginally daily x 7 days, then 3 times a week   Refills:  0        FISH OIL PO        Refills:  0        metroNIDAZOLE 1 % gel   Commonly known as:  METROGEL        Refills:  0        omeprazole 20 MG CR capsule   Commonly known as:  priLOSEC   Dose:  20 mg   Quantity:  90 capsule        Take 1 capsule (20 mg) by mouth daily   Refills:  2        tretinoin 0.1 % cream   Commonly known as:  RETIN-A        Apply QHS to face for rosacea   Refills:  0        * Notice:  This list has 2 medication(s) that are the same as other medications prescribed for you. Read the directions carefully, and ask your doctor or other care provider to review them with you.            Procedures and tests performed during your visit     Basic metabolic panel    CBC with platelets differential    EKG 12 lead    Orthostatic blood pressure and pulse    Troponin I      Orders Needing Specimen Collection     None      Pending Results     Date and Time Order Name Status Description    10/28/2018 1024 EKG 12 lead Preliminary             Pending Culture Results     No orders found from 10/26/2018 to 10/29/2018.            Pending Results Instructions     If you had any lab results that were not finalized at the time of your Discharge, you can call the ED Lab Result RN at [phone]. You will be contacted by this team for any positive Lab results or changes in treatment. The nurses are available 7 days a week from 10A to 6:30P.  You can leave a message 24 hours per day and they will return your call.        Thank you for choosing [facility]       Thank you for choosing [facility] for your care. Our goal is always to provide you with excellent care. Hearing back from our patients is one way we can continue to improve our services. Please take a few minutes to complete the written survey that you may receive in the mail after you visit with us. Thank you!        MyChart Information     [facility] gives  you secure access to your electronic health record. If you see a primary care provider, you can also send messages to your care team and make appointments. If you have questions, please call your primary care clinic.  If you do not have a primary care provider, please call [phone] and they will assist you.        Care EveryWhere ID     This is your Care EveryWhere ID. This could be used by other organizations to access your [facility] medical records  [id number]        Equal Access to Services     EILEEN GARCIA : Hadii andres pipero Socoy, waaxda lumaryadaha, qaybta kaalmada campbell, jayce potter. So [place] -[phone].    ATENCIÓN: Si habla español, tiene a norton disposición servicios gratuitos de asistencia lingüística. Eryname al [phone].    We comply with applicable federal civil rights laws and [place] laws. We do not discriminate on the basis of race, color, national origin, age, disability, sex, sexual orientation, or gender identity.            After Visit Summary       This is your record. Keep this with you and show to your community pharmacist(s) and doctor(s) at your next visit.

## 2018-10-28 NOTE — ED AVS SNAPSHOT
[facility], [place], Emergency Department    [address] [place] [address]    Phone:  [phone]                                       Yanely Jon   MRN: [id number]    Department:  [facility], Emergency Department   Date of Visit:  10/28/2018           After Visit Summary Signature Page     I have received my discharge instructions, and my questions have been answered. I have discussed any challenges I see with this plan with the nurse or doctor.    ..........................................................................................................................................  Patient/Patient Representative Signature      ..........................................................................................................................................  Patient Representative Print Name and Relationship to Patient    ..................................................               ................................................  Date                                   Time    ..........................................................................................................................................  Reviewed by Signature/Title    ...................................................              ..............................................  Date                                               Time          22EPIC Rev 08/18

## 2018-10-28 NOTE — DISCHARGE INSTRUCTIONS
Please make an appointment to follow up with Your Primary Care Provider as soon as possible.    Rest and stay well-hydrated today.  Eat regular meals.    Return to the emergency department for any problems.

## 2018-10-28 NOTE — ED NOTES
Initial Assessment: VSS. Pleasant and co-op. Denies SOB. Denies chest/shoulder/arm pain or discomfort. Pt states she 'feels fine' at this time. GCS=15

## 2018-10-29 LAB — INTERPRETATION ECG - MUSE: NORMAL

## 2019-01-09 ENCOUNTER — TRANSFERRED RECORDS (OUTPATIENT)
Dept: HEALTH INFORMATION MANAGEMENT | Facility: CLINIC | Age: 69
End: 2019-01-09

## 2019-01-12 NOTE — PROGRESS NOTES
Itinerary: (List all countries)  [place] and [place]   Departure Date: 2/4/19, Return Date: 2/20/19  Reason for Travel (i.e. business, pleasure): Pleasure  Visiting an urban or rural area? Both  Accommodations (i.e. hotel, hostel, friends, family etc.): Hotel  Women - First day of your last period: NA    IMMUNIZATION HISTORY  Have you received any vaccinations in the past 4 weeks?  No   Have you ever fainted from having your blood drawn or from an injection?  No  Have you ever had a fever reaction to a vaccination?  No  Have you had any bad reaction / side effect from any vaccination?  No  Have you ever had hepatitis A or B vaccine?  Yes  Do you live (or work closely with anyone who has AIDS, or any other immune disorder, or who is on chemotherapy for cancer or family history of immunodeficiency?  No  Have you received any injection of immune globulin or any blood products during the past 12 months?  No    GENERAL MEDICAL HISTORY  Do you have a medical condition that warrants maintenance meds or physician follow-up?  No  Do you have a medical condition that is stable now, but that may recur while traveling?  No  Has your spleen been removed?   No  Have you had an acute illness or a fever in the past 48 hours?  No  Are you pregnant or might you become pregnant on this trip? Any chance of pregnancy?  N/A  Are you breastfeeding?  N/A  Do you have HIV, AIDS, an AIDS-like condition, any other immune disorder, leukemia or cancer?  No  Do you have a severe combined immunodeficiency disease?  No  Have you had your thymus gland removed or a history of problems with your thymus, such as myasthenia gravis, DiGeorge syndrome, or thymoma?  No  Do you have severe thrombocytopenia (low platelet count) or blood clotting disorder?  No  Have you ever had a convulsion, seizure, epilepsy, neurologic condition or brain infection?  No  Do you have any stomach conditions?  No  Do you have a G6PD deficiency?  No  Do you have severe  renal or kidney impairment?  No  Do you have a history of psychiatric problems?  No  Do you have a problem with strange dreams and/or nightmares?  No  Do you have insomnia?  No  Do you have problems with vaginitis?  No  Do you have psoriasis?  No  Are you prone to motion sickness?  No  Have you ever had headaches, nausea, vomiting or breathing problems from altitude exposure?  No    MEDICATIONS  ARE YOU TAKING:  Steroids, prednisone, or anti-cancer drugs?  No  Antibiotics or sulfonamides?  No  Oral contraceptives?  N/A  Aspirin therapy? (children & adolescents)  No    ALLERGIES  ARE YOU ALLERGIC TO:  Any medications?  No  Any foods or other?  Yes     Most Recent Immunizations   Administered Date(s) Administered     HepA-Adult 09/12/2018     Influenza (H1N1) 02/04/2010     Influenza (High Dose) 3 valent vaccine 10/09/2018     Pneumo Conj 13-V (2010&after) 11/09/2015     Pneumococcal 23 valent 09/13/2013     TD (ADULT, 7+) 10/25/2017     TDAP Vaccine (Boostrix) 03/17/2005     Zoster vaccine, live 11/09/2015     Objective:  /75 (BP Location: Right arm, Patient Position: Sitting, Cuff Size: Adult Regular)   Pulse 88   Temp 97.6  F (36.4  C) (Oral)   Resp 16   Wt 73.4 kg (161 lb 12 oz)   SpO2 99%   BMI 25.51 kg/m    Appears well    ASSESSMENT/PLAN:  [place] [facility] [place]    Comment: For Malaria prophylaxis  Avoid Mosquitos if possible.   Use DEET, Long sleeves, etc    Plan: mefloquine (LARIAM) 250 MG tablet, take weekly starting 1-2 weeks prior to trip and continuing for 4 weeks after trip.     Give Typhoid Immunization and 2nd Hep A.   Up to date on all others    For prevention of Traveler's Diarrhea  Use Pepto Bismol, 2 tabs 4 times/day starting 1-2 days prior to travel and then only 1 time/day with travel.  Also, if Travelers diarrhea starts, Take: azithromycin        (ZITHROMAX) 500 MG tablet      Total time 25 minutes, greater than 50 percent in counseling and coordination of  care.

## 2019-01-14 ENCOUNTER — OFFICE VISIT (OUTPATIENT)
Dept: FAMILY MEDICINE | Facility: CLINIC | Age: 69
End: 2019-01-14
Payer: MEDICARE

## 2019-01-14 VITALS
BODY MASS INDEX: 25.51 KG/M2 | DIASTOLIC BLOOD PRESSURE: 75 MMHG | RESPIRATION RATE: 16 BRPM | WEIGHT: 161.75 LBS | TEMPERATURE: 97.6 F | HEART RATE: 88 BPM | SYSTOLIC BLOOD PRESSURE: 158 MMHG | OXYGEN SATURATION: 99 %

## 2019-01-14 DIAGNOSIS — Z71.84 TRAVEL ADVICE ENCOUNTER: Primary | ICD-10-CM

## 2019-01-14 RX ORDER — MEFLOQUINE HYDROCHLORIDE 250 MG/1
TABLET ORAL
Qty: 8 TABLET | Refills: 0 | Status: SHIPPED | OUTPATIENT
Start: 2019-01-14 | End: 2019-12-11

## 2019-01-14 RX ORDER — AZITHROMYCIN 500 MG/1
TABLET, FILM COATED ORAL
Qty: 3 TABLET | Refills: 0 | Status: SHIPPED | OUTPATIENT
Start: 2019-01-14 | End: 2019-12-11

## 2019-01-14 ASSESSMENT — PAIN SCALES - GENERAL: PAINLEVEL: NO PAIN (0)

## 2019-01-14 NOTE — PATIENT INSTRUCTIONS
Comment: For Malaria prophylaxis  Avoid Mosquitos if possible.   Use DEET, Long sleeves, etc    Plan: mefloquine (LARIAM) 250 MG tablet,     Give Typhoid Immunization and 2nd Hep A.   Up to date on all others    For prevention of Traveler's Diarrhea  Use Pepto Bismol, 2 tabs 4 times/day starting 1-2 days prior to travel and then only 1 time/day with travel.  Also, if Travelers diarrhea starts, Take: azithromycin        (ZITHROMAX) 500 MG tablet

## 2019-01-14 NOTE — NURSING NOTE
Screening Questionnaire for Adult Immunization    Are you sick today?   No   Do you have allergies to medications, food, a vaccine component or latex?   No   Have you ever had a serious reaction after receiving a vaccination?   No   Do you have a long-term health problem with heart disease, lung disease, asthma, kidney disease, metabolic disease (e.g. diabetes), anemia, or other blood disorder?   No   Do you have cancer, leukemia, HIV/AIDS, or any other immune system problem?   No   In the past 3 months, have you taken medications that affect  your immune system, such as prednisone, other steroids, or anticancer drugs; drugs for the treatment of rheumatoid arthritis, Crohn s disease, or psoriasis; or have you had radiation treatments?   No   Have you had a seizure, or a brain or other nervous system problem?   No   During the past year, have you received a transfusion of blood or blood     products, or been given immune (gamma) globulin or antiviral drug?   No   For women: Are you pregnant or is there a chance you could become        pregnant during the next month?   No   Have you received any vaccinations in the past 4 weeks?   No     Immunization questionnaire answers were all negative.        Per orders of Dr. Márquez, injection of Hep A and Typhoid given by Christine Tinoco. Patient instructed to remain in clinic for 15 minutes afterwards, and to report any adverse reaction to me immediately.       Screening performed by Christine Tinoco on 1/14/2019 at 4:15 PM.

## 2019-02-05 DIAGNOSIS — M15.8 OTHER OSTEOARTHRITIS INVOLVING MULTIPLE JOINTS: ICD-10-CM

## 2019-02-05 RX ORDER — CELECOXIB 200 MG/1
200 CAPSULE ORAL DAILY
Qty: 90 CAPSULE | Refills: 0 | Status: SHIPPED | OUTPATIENT
Start: 2019-02-05 | End: 2019-07-01

## 2019-02-05 NOTE — TELEPHONE ENCOUNTER
Last time prescribed: 2/21/18 , 90 tabs/caps x 3 refills  Last office visit: 1/14/19  Next appointment: No Future Appointment Scheduled    Medication is being filled for 1 time refill only due to:  Patient needs labs AST, ALT. Future labs ordered AST, ALT.   Ifrah Paul RN  [facility]

## 2019-04-08 ENCOUNTER — TELEPHONE (OUTPATIENT)
Dept: FAMILY MEDICINE | Facility: CLINIC | Age: 69
End: 2019-04-08

## 2019-04-08 NOTE — TELEPHONE ENCOUNTER
[place] with [facility] [employer]. Patient received vaccines and they need NDC codes for Typhoid and Hep A vaccines given. Chart reviewed, NDC given.   Mary Jo Hummel RN  04/08/19  10:53 AM

## 2019-07-01 DIAGNOSIS — M15.8 OTHER OSTEOARTHRITIS INVOLVING MULTIPLE JOINTS: ICD-10-CM

## 2019-07-01 NOTE — TELEPHONE ENCOUNTER
Last office visit: 1/14/19  Next appointment: 02/05/19  Medication last refilled: 02/05/2019 #[id number]    Needs labs for further refills.    Angelita    Routing refill request to provider for review/approval because:  Karuna given x1 and patient did not follow up, please advise  Labs not current:  Alt/ast, futures placed    Needs BP recheck also    Also pt over age limit of 63 yo for the refill protocol for this med.  Are you willing to refill?   decreasing to  30 pills for this refill   Yoanna El,RN  July 1, 2019 4:38 PM

## 2019-07-02 RX ORDER — CELECOXIB 200 MG/1
200 CAPSULE ORAL DAILY
Qty: 30 CAPSULE | Refills: 0 | Status: SHIPPED | OUTPATIENT
Start: 2019-07-02 | End: 2019-08-02

## 2019-07-16 ENCOUNTER — TRANSFERRED RECORDS (OUTPATIENT)
Dept: HEALTH INFORMATION MANAGEMENT | Facility: CLINIC | Age: 69
End: 2019-07-16

## 2019-08-02 DIAGNOSIS — M15.8 OTHER OSTEOARTHRITIS INVOLVING MULTIPLE JOINTS: ICD-10-CM

## 2019-08-02 RX ORDER — CELECOXIB 200 MG/1
200 CAPSULE ORAL DAILY
Qty: 30 CAPSULE | Refills: 0 | Status: SHIPPED | OUTPATIENT
Start: 2019-08-02 | End: 2019-09-06

## 2019-08-02 NOTE — TELEPHONE ENCOUNTER
Last time prescribed: 7/2/19 , 30 tabs x 0 refills  Last office visit: 1/14/19  Next appointment: No future appointments    Medication is being filled for 1 time refill only due to:  Patient needs labs ALT, AST - also needs BP check.   Ifrah Paul RN  [facility]

## 2019-09-04 DIAGNOSIS — M15.8 OTHER OSTEOARTHRITIS INVOLVING MULTIPLE JOINTS: ICD-10-CM

## 2019-09-04 NOTE — TELEPHONE ENCOUNTER
Last time prescribed: 8/2/19 , 30 caps x 0 refills  Last office visit: 1/14/19  Next appointment: 12/11/19    Medication is being filled for 1 time refill only due to:  Patient needs labs AST, ALT - orders already in chart.   Has not seen Dr Almanzar since 2/21/18.  Ifrah Paul RN  [facility]

## 2019-09-06 RX ORDER — CELECOXIB 200 MG/1
200 CAPSULE ORAL DAILY
Qty: 90 CAPSULE | Refills: 0 | Status: SHIPPED | OUTPATIENT
Start: 2019-09-06 | End: 2019-12-11

## 2019-11-06 ENCOUNTER — HEALTH MAINTENANCE LETTER (OUTPATIENT)
Age: 69
End: 2019-11-06

## 2019-12-06 NOTE — PATIENT INSTRUCTIONS
Patient Education   Personalized Prevention Plan  You are due for the preventive services outlined below.  Your care team is available to assist you in scheduling these services.  If you have already completed any of these items, please share that information with your care team to update in your medical record.  Health Maintenance Due   Topic Date Due     Zoster (Shingles) Vaccine (2 of 3) 01/04/2016     Mammogram  11/09/2018     Annual Wellness Visit  02/21/2019     FALL RISK ASSESSMENT  02/21/2019

## 2019-12-06 NOTE — PROGRESS NOTES
"Virginia \"Ashtyn\" YOANA Jon is here for a general check up. She is fasting. She is up to date on eye exams (occasional right eye \"pressure\" in mornings, family hx of glaucoma) and dental visits. Wears seat belt-yes. Bike helmet- yes.   Concerns today:    BRIAN Chamorro is a 69 year old female here for check up.  Her last colonoscopy was 2014, normal, in [place].  Last DEXA in 09/2013, with T score of -1.9.  Family hx of osteoporosis in her mother.  Due for repeat this year.  Mammmogram, in 11/9/2016, with scattered fibroglandular changes.  No suspicious findings.  She has tried intermittnet fasting to lose weight, only eating from noon to 8 pm.  She does track her daily calorie intake.  Exercises daily, [employer] (HIIT) and walking.  Mostly eats fish and chicken; variety of fruits and vegetables.  She is considering starting the Atkins or Keto diet. Would like to lose 10 pounds.    Wt Readings from Last 2 Encounters:   12/11/19 76.3 kg (168 lb 4 oz)   01/14/19 73.4 kg (161 lb 12 oz)       Osteoarthritis of Both Knees  Virginia takes Celebrex daily for bilateral knee pain.  Her knees are \"not so good\", and she has difficulty going down stairs.  Her left knee is worse than her right, noting something \"feels loose\".   She follows with [facility], seeing Dr. Mosher.  She has injections every 6-9 months, which have provided relief.  She has been in PT in the past, but is not curretnly.  Denies GERD or chest pain from Celebrex.     History of aThyroid Nodule  Virginia has a hx of a thyroid nodule, which was found incidentally on an MRI before she started seeing me.  She denies any sx or thyroid disease. Has never had a thyroid ultrasound or labs. No family hx of thyroid issues. No neck pain or fullness.     Post nasal drip  She has some seasonal allergies and uses cetirizine.       Advance directive: On file; up to date.  Hearing concerns: No concerns  Fall Risk: No  Independent at home: Yes  Safe : Yes  Memory " concerns: No concerns    COGNITIVE SCREEN  1) Repeat 3 items (Bicycle, Hat, [place])    2) Clock draw: NORMAL  3) 3 item recall: Recalls 3 objects  Results: 3 items recalled: COGNITIVE IMPAIRMENT LESS LIKELY    Mini-CogTM Copyright ALLISON Chacko. Licensed by the author for use in [employer]; reprinted with permission ([email or web address]). All rights reserved.        Health Maintenance   Topic Date Due     ZOSTER IMMUNIZATION (2 of 3) 01/04/2016     MAMMO SCREENING  11/09/2018     MEDICARE ANNUAL WELLNESS VISIT  02/21/2019     FALL RISK ASSESSMENT  02/21/2019     ADVANCE CARE PLANNING  11/13/2022     LIPID  02/21/2023     COLONOSCOPY  10/29/2024     DTAP/TDAP/TD IMMUNIZATION (3 - Td) 10/25/2027     DEXA  Completed     HEPATITIS C SCREENING  Completed     PHQ-2  Completed     INFLUENZA VACCINE  Completed     PNEUMOCOCCAL IMMUNIZATION 65+ LOW/MEDIUM RISK  Completed     IPV IMMUNIZATION  Aged Out     MENINGITIS IMMUNIZATION  Aged Out       Patient Active Problem List   Diagnosis     Cervicalgia     Thyroid nodule     Primary osteoarthritis of knee     Seasonal allergic rhinitis     Rosacea     Symptomatic varicose veins of both lower extremities     Osteopenia of spine       Past Surgical History:   Procedure Laterality Date     APPENDECTOMY       BUNIONECTOMY Bilateral 10/2017     CATARACT IOL, RT/LT Right      COLONOSCOPY  2014    repeat in 10 yr     COSMETIC SURGERY  01/23/2014    neck     DENTAL SURGERY       MAMMOPLASTY AUGMENTATION Bilateral 1972     SCLEROTHERAPY  10/03/2014    varicose veins     SURGICAL PATHOLOGY EXAM  01/13/2014    lower back     TONSILLECTOMY       TUBAL LIGATION         Family History   Problem Relation Age of Onset     Osteoarthritis Mother      Cerebrovascular Disease Mother 95     Osteoporosis Mother      Bladder Cancer Father      Breast Cancer Sister 53     Heart Disease Brother 50        stents, was smoker     Hyperlipidemia Maternal Aunt      Hyperlipidemia Maternal Aunt         Social    3 daughters, 9 grandchildren (ages 24 to 3)  Retired hairdresser     HABITS:  Tob: never  ETOH: 1-2 per week  Calcium: 3-4 per day; soy milk,   Caffeine: 0-1 per day  Exercise: daily; [employer] (HIIT classes) twice a week, gym in her building and walking     OB/GYN HISTORY:  LMP: No LMP recorded. Patient is postmenopausal.  menopause age 38. Had bleeding from fibroids, placed on OCP until menopause  Sexually active, reports vaginal dryness, no bleeding, uses OTC lubricants  Hx abnormal pap?  none  STD hx?  none  Vasomotor sx:  none  Contraception: na  G 4   P 3   A 1  Self Breast exam:  yes    Current Outpatient Medications   Medication Sig Dispense Refill     celecoxib (CELEBREX) 200 MG capsule Take 1 capsule (200 mg) by mouth daily 90 capsule 3     cetirizine (ZYRTEC) 10 MG tablet Take 1 tablet (10 mg) by mouth daily 90 tablet 3     metroNIDAZOLE (METROGEL) 1 % gel        Omega-3 Fatty Acids (FISH OIL PO)        tretinoin (RETIN-A) 0.1 % cream Apply QHS to face for rosacea       Allergies   Allergen Reactions     Cheese      Fentanyl Other (See Comments)     May have caused pt. To pass out after cataract      Latex Dermatitis     Methohexital Other (See Comments)     May have caused pt. To pass out after cataract      Midazolam Other (See Comments)     May have caused pt. To pass out after cataract surgery     Seasonal Allergies          ROS  CONSTITUTIONAL:NEGATIVE for fever, chills, change in weight  INTEGUMENTARY/SKIN: NEGATIVE for worrisome rashes, moles or lesions  EYES: NEGATIVE for vision changes or irritation  ENT/MOUTH: NEGATIVE for ear, mouth and throat problems, allergy symptoms, post nasal drip, nasal congestion  RESP:NEGATIVE for significant cough or SOB  BREAST: NEGATIVE for masses, tenderness or discharge  CV: NEGATIVE for chest pain, palpitations, CERVANTES, orthopnea, PND  or peripheral edema  GI: NEGATIVE for nausea, abdominal pain, heartburn, or change in bowel  "habits  :NEGATIVE for frequency, dysuria, or hematuria  MUSCULOSKELETAL:NEGATIVE for significant arthralgias or myalgia  NEURO: NEGATIVE for weakness, dizziness or paresthesias  ENDOCRINE: NEGATIVE for polyuria/dipsia,  temperature intolerance, skin/hair changes  HEME/ALLERGY/IMMUNE: NEGATIVE for bleeding problems  PSYCHIATRIC: NEGATIVE for changes in mood or affect    EXAM  /82 (BP Location: Right arm, Patient Position: Sitting, Cuff Size: Adult Regular)   Pulse 87   Temp 97.7  F (36.5  C) (Oral)   Resp 18   Ht 1.69 m (5' 6.54\")   Wt 76.3 kg (168 lb 4 oz)   SpO2 97%   BMI 26.72 kg/m      GENERAL APPEARANCE: Alert, pleasant, NAD  EYES: PERRL, EOMI, conjunctiva clear  HENT: TM normal bilaterally. Nose and mouth without lesions, mild tenderness with palpation over the right frontal sinus  NECK: no adenopathy, thyroid with nodular texture, nontender  RESP: lungs clear to auscultation bilaterally  BREAST: normal without masses, no tenderness or nipple discharge and no palpable  axillary masses or adenopathy   CV: regular rate and rhythm, normal S1 S2, no murmur, no carotid bruits  ABDOMEN: soft, nontender, without HSM or masses. Bowel sounds normal  MS: extremities normal- no gross deformities noted, no tender, hot or swollen joints.  SKIN: no suspicious lesions or rashes  NEURO: Normal strength and tone, sensory exam grossly normal, DTR normoreflexive in upper and lower extremities  PSYCH: mentation appears normal. and affect normal/bright.  EXT: no peripheral edema, pedal pulses palpable    Assessment:  (Z00.00) Encounter for Medicare annual wellness exam  (primary encounter diagnosis)  Comment: 69 year old female in stable health.  Plan: Comprehensive Metabolic Panel ([facility]), CBC         with Plt ([facility])        Anticipatory guidance given today regarding diet, exercise and calcium intake, safety. She is up to date on colonoscopy. Due for mammogram and DEXA. Up to date on vaccines.     (Z12.31) " Encounter for screening mammogram for breast cancer  Comment: Due, ordered.  Plan: Mammogram - routine screening            (Z13.220) Screening for lipid disorders  Comment: Fasting, routine screening.  Plan: Lipid Panel ([facility])            (E28.39) Estrogen deficiency  Comment: hx of osteopenia T -1.9 in 2013  Plan: Dexa hip/pelvis/spine*        Discussed calcium/D guidelines, weight bearing exercise    (M15.8) Other osteoarthritis involving multiple joints  Comment: bilateral knee pain, followed with [facility]  Plan: celecoxib (CELEBREX) 200 MG capsule        Refilled medication     (E04.1) Thyroid nodule  Comment: hx of thyroid nodule, seen on MRI, has never had ultrasound  Plan: TSH with free T4 reflex, US Thyroid        Refer for ultrasound and check TSH today    Sherlyn Almanzar MD  Internal Medicine/Pediatrics      I, Hans Sanchez, am serving as a scribe to document services personally performed by Dr. Sherlyn Almanzar, based on data collection and the provider's statements to me. Dr. Almanzar has reviewed, edited, and approved the above note.

## 2019-12-11 ENCOUNTER — OFFICE VISIT (OUTPATIENT)
Dept: FAMILY MEDICINE | Facility: CLINIC | Age: 69
End: 2019-12-11
Payer: MEDICARE

## 2019-12-11 VITALS
SYSTOLIC BLOOD PRESSURE: 128 MMHG | BODY MASS INDEX: 26.41 KG/M2 | TEMPERATURE: 97.7 F | DIASTOLIC BLOOD PRESSURE: 82 MMHG | HEIGHT: 67 IN | RESPIRATION RATE: 18 BRPM | HEART RATE: 87 BPM | WEIGHT: 168.25 LBS | OXYGEN SATURATION: 97 %

## 2019-12-11 DIAGNOSIS — Z13.220 SCREENING FOR LIPID DISORDERS: ICD-10-CM

## 2019-12-11 DIAGNOSIS — E04.2 NONTOXIC MULTINODULAR GOITER: ICD-10-CM

## 2019-12-11 DIAGNOSIS — Z00.00 ENCOUNTER FOR MEDICARE ANNUAL WELLNESS EXAM: Primary | ICD-10-CM

## 2019-12-11 DIAGNOSIS — E04.1 THYROID NODULE: ICD-10-CM

## 2019-12-11 DIAGNOSIS — Z12.31 ENCOUNTER FOR SCREENING MAMMOGRAM FOR BREAST CANCER: ICD-10-CM

## 2019-12-11 DIAGNOSIS — E28.39 ESTROGEN DEFICIENCY: ICD-10-CM

## 2019-12-11 DIAGNOSIS — M15.8 OTHER OSTEOARTHRITIS INVOLVING MULTIPLE JOINTS: ICD-10-CM

## 2019-12-11 LAB
ALBUMIN SERPL-MCNC: 4 G/DL (ref 3.2–4.5)
ALP SERPL-CCNC: 48 U/L (ref 40–150)
ALT SERPL-CCNC: 34 U/L (ref 0–50)
AST SERPL-CCNC: 27 U/L (ref 0–45)
BILIRUB SERPL-MCNC: 0.6 MG/DL (ref 0.2–1.3)
BUN SERPL-MCNC: 19 MG/DL (ref 7–30)
CALCIUM SERPL-MCNC: 9.4 MG/DL (ref 8.5–10.4)
CHLORIDE SERPLBLD-SCNC: 108 MMOL/L (ref 94–109)
CHOLEST SERPL-MCNC: 262 MG/DL (ref 0–200)
CHOLEST/HDLC SERPL: 2.8 {RATIO} (ref 0–5)
CO2 SERPL-SCNC: 30 MMOL/L (ref 20–32)
CREAT SERPL-MCNC: 0.7 MG/DL (ref 0.6–1.3)
EGFR CALCULATED (BLACK REFERENCE): 106.1
EGFR CALCULATED (NON BLACK REFERENCE): 87.7
ERYTHROCYTE [DISTWIDTH] IN BLOOD BY AUTOMATED COUNT: 13.1 %
FASTING SPECIMEN: YES
GLUCOSE SERPL-MCNC: 106 MG/DL (ref 60–99)
HCT VFR BLD AUTO: 43.5 % (ref 35–47)
HDLC SERPL-MCNC: 95 MG/DL
HEMOGLOBIN: 13.9 G/DL (ref 11.7–15.7)
LDLC SERPL CALC-MCNC: 158 MG/DL (ref 0–129)
MCH RBC QN AUTO: 31.4 PG (ref 26.5–35)
MCHC RBC AUTO-ENTMCNC: 32 G/DL (ref 32–36)
MCV RBC AUTO: 98.4 FL (ref 78–100)
PLATELET # BLD AUTO: 233 K/UL (ref 150–450)
POTASSIUM SERPL-SCNC: 4.2 MMOL/L (ref 3.4–5.3)
PROT SERPL-MCNC: 7.3 G/DL (ref 6.8–8.8)
RBC # BLD AUTO: 4.42 M/UL (ref 3.8–5.2)
SODIUM SERPL-SCNC: 144 MMOL/L (ref 137.3–146.3)
TRIGL SERPL-MCNC: 47 MG/DL (ref 0–150)
TSH SERPL DL<=0.005 MIU/L-ACNC: 2.58 MU/L (ref 0.4–4)
VLDL-CHOLESTEROL: 9 (ref 7–32)
WBC # BLD AUTO: 4.8 K/UL (ref 4–11)

## 2019-12-11 RX ORDER — METRONIDAZOLE 10 MG/G
GEL TOPICAL
COMMUNITY
Start: 2019-10-09 | End: 2019-12-11

## 2019-12-11 RX ORDER — CELECOXIB 200 MG/1
200 CAPSULE ORAL DAILY
Qty: 90 CAPSULE | Refills: 3 | Status: SHIPPED | OUTPATIENT
Start: 2019-12-11 | End: 2021-01-22

## 2019-12-11 RX ORDER — CETIRIZINE HYDROCHLORIDE 10 MG/1
10 TABLET ORAL DAILY
Qty: 90 TABLET | Refills: 3 | COMMUNITY
Start: 2019-12-11

## 2019-12-11 ASSESSMENT — MIFFLIN-ST. JEOR: SCORE: 1303.42

## 2019-12-11 NOTE — NURSING NOTE
"69 year old  Chief Complaint   Patient presents with     Medicare Visit     69 yrs old ,  fasting ,         Blood pressure 128/82, pulse 87, temperature 97.7  F (36.5  C), temperature source Oral, resp. rate 18, height 1.69 m (5' 6.54\"), weight 76.3 kg (168 lb 4 oz), SpO2 97 %. Body mass index is 26.72 kg/m .  Patient Active Problem List   Diagnosis     Cervicalgia     Thyroid nodule     Primary osteoarthritis of knee     Seasonal allergic rhinitis     Rosacea     Symptomatic varicose veins of both lower extremities     Osteopenia of spine       Wt Readings from Last 2 Encounters:   12/11/19 76.3 kg (168 lb 4 oz)   01/14/19 73.4 kg (161 lb 12 oz)     BP Readings from Last 3 Encounters:   12/11/19 128/82   01/14/19 158/75   10/28/18 129/81         Current Outpatient Medications   Medication     aspirin 81 MG tablet     celecoxib (CELEBREX) 200 MG capsule     cyanocobalamin (VITAMIN  B-12) 1000 MCG tablet     ESTRIOL 0.5MG/GM CREAM     ESTRIOL 0.5MG/GM CREAM     loratadine (CLARITIN) 10 MG tablet     metroNIDAZOLE (METROGEL) 1 % gel     Omega-3 Fatty Acids (FISH OIL PO)     omeprazole (PRILOSEC) 20 MG CR capsule     tretinoin (RETIN-A) 0.1 % cream     mefloquine (LARIAM) 250 MG tablet     No current facility-administered medications for this visit.        Social History     Tobacco Use     Smoking status: Never Smoker     Smokeless tobacco: Never Used   Substance Use Topics     Alcohol use: Yes     Comment: 0-1 per day     Drug use: No       Health Maintenance Due   Topic Date Due     ZOSTER IMMUNIZATION (2 of 3) 01/04/2016     MAMMO SCREENING  11/09/2018     FALL RISK ASSESSMENT  02/21/2019       No results found for: PAP      December 11, 2019 8:06 AM  "

## 2019-12-19 ENCOUNTER — ANCILLARY PROCEDURE (OUTPATIENT)
Dept: BONE DENSITY | Facility: CLINIC | Age: 69
End: 2019-12-19
Attending: INTERNAL MEDICINE
Payer: MEDICARE

## 2019-12-19 ENCOUNTER — ANCILLARY PROCEDURE (OUTPATIENT)
Dept: MAMMOGRAPHY | Facility: CLINIC | Age: 69
End: 2019-12-19
Attending: INTERNAL MEDICINE
Payer: MEDICARE

## 2019-12-19 ENCOUNTER — ANCILLARY PROCEDURE (OUTPATIENT)
Dept: ULTRASOUND IMAGING | Facility: CLINIC | Age: 69
End: 2019-12-19
Attending: INTERNAL MEDICINE
Payer: MEDICARE

## 2019-12-19 DIAGNOSIS — E28.39 ESTROGEN DEFICIENCY: ICD-10-CM

## 2019-12-19 DIAGNOSIS — E04.1 THYROID NODULE: ICD-10-CM

## 2019-12-19 DIAGNOSIS — Z12.31 ENCOUNTER FOR SCREENING MAMMOGRAM FOR BREAST CANCER: ICD-10-CM

## 2019-12-27 ENCOUNTER — TELEPHONE (OUTPATIENT)
Dept: ENDOCRINOLOGY | Facility: CLINIC | Age: 69
End: 2019-12-27

## 2019-12-27 NOTE — TELEPHONE ENCOUNTER
To schedulers: Please schedule  for lst available endocrine.  Preferred provider Arabella Murry Trost Lynn A Burmeister, MD  Endocrine triage      [facility] Call Center    Phone Message    May a detailed message be left on voicemail: yes    Reason for Call: Other: Pt has internal referral - Nontoxic multinodular goiter - please review and contact patient     Action Taken: Message routed to:  [facility] ([facility] Endocrine

## 2020-01-07 NOTE — TELEPHONE ENCOUNTER
RECORDS RECEIVED FROM: internal   DATE RECEIVED: 2.26.20   NOTES (FOR ALL VISITS) STATUS DETAILS   OFFICE NOTES from referring provider Internal 12.11.19 Sherlyn Almanzar MD   OFFICE NOTES from other specialist N/A    ED NOTES N/A    OPERATIVE REPORT  (thyroid, pituitary, adrenal, parathyroid) N/A    MEDICATION LIST Internal    IMAGING      DEXASCAN Internal 12/19/19   MRI (BRAIN) N/A    XR (Chest) N/A    CT (HEAD/NECK/CHEST/ABDOMEN) N/A    NUCLEAR  N/A    ULTRASOUND (HEAD/NECK) Internal Thyroid- 12/19/19   LABS     DIABETES: HBGA1C, CREATININE, FASTING LIPIDS, MICROALBUMIN URINE, POTASSIUM, TSH, T4    THYROID: TSH, T4, CBC, THYRODLONULIN, TOTAL T3, FREE T4, CALCITONIN, CEA Internal   TSH-12.11.19  T4-12.11.19  CBC- 2.21.18

## 2020-02-03 ENCOUNTER — TRANSFERRED RECORDS (OUTPATIENT)
Dept: HEALTH INFORMATION MANAGEMENT | Facility: CLINIC | Age: 70
End: 2020-02-03

## 2020-02-10 ENCOUNTER — OFFICE VISIT (OUTPATIENT)
Dept: FAMILY MEDICINE | Facility: CLINIC | Age: 70
End: 2020-02-10
Payer: MEDICARE

## 2020-02-10 VITALS
SYSTOLIC BLOOD PRESSURE: 142 MMHG | DIASTOLIC BLOOD PRESSURE: 96 MMHG | WEIGHT: 175.25 LBS | HEIGHT: 67 IN | BODY MASS INDEX: 27.51 KG/M2 | HEART RATE: 84 BPM | OXYGEN SATURATION: 99 % | TEMPERATURE: 98.1 F

## 2020-02-10 DIAGNOSIS — R03.0 ELEVATED BLOOD PRESSURE READING WITHOUT DIAGNOSIS OF HYPERTENSION: ICD-10-CM

## 2020-02-10 DIAGNOSIS — E78.5 HYPERLIPIDEMIA LDL GOAL <130: Primary | ICD-10-CM

## 2020-02-10 LAB
ALT SERPL-CCNC: 39 U/L (ref 0–50)
AST SERPL-CCNC: 30 U/L (ref 0–45)
CHOLEST SERPL-MCNC: 174 MG/DL (ref 0–200)
CHOLEST/HDLC SERPL: 2.6 {RATIO} (ref 0–5)
FASTING SPECIMEN: NO
GLUCOSE SERPL-MCNC: 98 MG/DL (ref 60–99)
HDLC SERPL-MCNC: 68 MG/DL
LDLC SERPL CALC-MCNC: 95 MG/DL (ref 0–129)
TRIGL SERPL-MCNC: 57 MG/DL (ref 0–150)
VLDL-CHOLESTEROL: 11 (ref 7–32)

## 2020-02-10 RX ORDER — ATORVASTATIN CALCIUM 10 MG/1
10 TABLET, FILM COATED ORAL DAILY
Qty: 90 TABLET | Refills: 0 | Status: CANCELLED | OUTPATIENT
Start: 2020-02-10

## 2020-02-10 ASSESSMENT — MIFFLIN-ST. JEOR: SCORE: 1335.17

## 2020-02-10 NOTE — NURSING NOTE
"69 year old  Chief Complaint   Patient presents with     Recheck Medication     atorvastatin , lab        Blood pressure (!) 142/85, pulse 84, temperature 98.1  F (36.7  C), temperature source Oral, height 1.69 m (5' 6.54\"), weight 79.5 kg (175 lb 4 oz), SpO2 99 %. Body mass index is 27.83 kg/m .  Patient Active Problem List   Diagnosis     Cervicalgia     Thyroid nodule     Primary osteoarthritis of knee     Seasonal allergic rhinitis     Rosacea     Symptomatic varicose veins of both lower extremities     Osteopenia of spine       Wt Readings from Last 2 Encounters:   02/10/20 79.5 kg (175 lb 4 oz)   12/11/19 76.3 kg (168 lb 4 oz)     BP Readings from Last 3 Encounters:   02/10/20 (!) 142/85   12/11/19 128/82   01/14/19 158/75         Current Outpatient Medications   Medication     atorvastatin (LIPITOR) 10 MG tablet     celecoxib (CELEBREX) 200 MG capsule     cetirizine (ZYRTEC) 10 MG tablet     metroNIDAZOLE (METROGEL) 1 % gel     Omega-3 Fatty Acids (FISH OIL PO)     tretinoin (RETIN-A) 0.1 % cream     No current facility-administered medications for this visit.        Social History     Tobacco Use     Smoking status: Never Smoker     Smokeless tobacco: Never Used   Substance Use Topics     Alcohol use: Yes     Comment: 0-1 per day     Drug use: No       Health Maintenance Due   Topic Date Due     PHQ-2  01/01/2020     ZOSTER IMMUNIZATION (3 of 3) 01/18/2020       No results found for: PAP      February 10, 2020 2:12 PM    "

## 2020-02-10 NOTE — PROGRESS NOTES
Yanely Jon is a 69 year old female here for the following issues:    Hyperlipidemia LDL goal <130  Virginia is a 69 year old female who presents for follow-up of hyperlipidemia. Her most recent total cholesterol was 262. She was started on atorvastatin 10 mg once daily in the morning. No chest pain, SOB, orthopnea or perpherial edema. She also denies any myalgias, arthalgias or muscle weakness. Nonsmoker.    Lab Results   Component Value Date    CHOL 262.0 12/11/2019    HDL 95.0 12/11/2019    .0 12/11/2019     02/21/2018    TRIG 47.0 12/11/2019    CHOLHDLRATIO 2.8 12/11/2019     Acute sinusitis  Ashtyn was seen on 02/06/2020 for sinus pressure, cough and fatigue. She also had some thick yellow and bloody mucus coming from her nose. She was started on a 10-day course of Augmentin 500-125 mg twice daily. Overall her symptoms have improved. She continues to have some fatigue.    Elevated blood pressure without diagnosis of hypertension  Her blood pressure is elevated today in clinic. No history of hypertension.     BP Readings from Last 3 Encounters:   02/10/20 (!) 142/96   12/11/19 128/82   01/14/19 158/75        Patient Active Problem List   Diagnosis     Cervicalgia     Thyroid nodule     Primary osteoarthritis of knee     Seasonal allergic rhinitis     Rosacea     Symptomatic varicose veins of both lower extremities     Osteopenia of spine       Current Outpatient Medications   Medication Sig Dispense Refill     atorvastatin (LIPITOR) 10 MG tablet Take 1 tablet (10 mg) by mouth daily 90 tablet 0     celecoxib (CELEBREX) 200 MG capsule Take 1 capsule (200 mg) by mouth daily 90 capsule 3     cetirizine (ZYRTEC) 10 MG tablet Take 1 tablet (10 mg) by mouth daily 90 tablet 3     metroNIDAZOLE (METROGEL) 1 % gel        Omega-3 Fatty Acids (FISH OIL PO)        tretinoin (RETIN-A) 0.1 % cream Apply QHS to face for rosacea         Allergies   Allergen Reactions     Cheese      Fentanyl Other (See  "Comments)     May have caused pt. To pass out after cataract      Latex Dermatitis     Methohexital Other (See Comments)     May have caused pt. To pass out after cataract      Midazolam Other (See Comments)     May have caused pt. To pass out after cataract surgery     Seasonal Allergies         EXAM  BP (!) 142/96 (BP Location: Right arm, Patient Position: Sitting, Cuff Size: Adult Regular)   Pulse 84   Temp 98.1  F (36.7  C) (Oral)   Ht 1.69 m (5' 6.54\")   Wt 79.5 kg (175 lb 4 oz)   SpO2 99%   BMI 27.83 kg/m    Gen: Alert, pleasant, NAD  Neck: No lymphadenopathy or thyromegaly  COR: S1,S2, no murmur  Lungs: CTA bilaterally, no rhonchi, wheezes or rales  Ext: no peripheral edema, pulses full    Assessment:  (E78.5) Hyperlipidemia LDL goal <130  Comment: No side effects with atorvastatin. Will recheck level today.  Plan: Lipid Panel Plus([place])        Will notify patient of lab results. May adjust medication based on results.     (R03.0) Elevated blood pressure reading without diagnosis of hypertension  Comment: Blood pressure elevated today.  Plan: Recommend monitoring blood pressure outside of clinic. Contact MD if remains elevated.       Sherlyn Almanzar MD  Internal Medicine/Pediatrics    I, Silverio Le, am serving as a scribe to document services personally performed by Dr. Sherlyn Almanzar, based on data collection and the provider's statements to me. Dr. Almanzar has reviewed, edited and approved the above note.   "

## 2020-02-21 ENCOUNTER — TELEPHONE (OUTPATIENT)
Dept: FAMILY MEDICINE | Facility: CLINIC | Age: 70
End: 2020-02-21

## 2020-02-21 NOTE — TELEPHONE ENCOUNTER
Patient c/o headache behind eyes, yellow drainage - but not real thick. She is feeling better than she was when she was dx with sinusitis, great improvement with the abx and Flonase. Denies fever, difficulty breathing, face pain. Just worried about going to [place] and not feeling great. Relayed message from Dr. Almanzar. Patient agrees to try Neti pot and will continue to use Flonase. She will use Afrin only for day of flight. Call back with an update. Patient verbalizes understanding and agrees with this plan.     Mary Jo Hummel RN  02/21/20  9:40 AM    ++++++++++++++++++++++    Inquire if this is simply post nasal drip or if this is purulent nasal discharge (thick yellow or green, that is persistent all day long?), considering a CT of her sinuses or referral to see an ENT doctor to investigate.     I don't usually recommend treating without evaluation. At this point recommend netti pot, flonase and further workup. If flying to [place], would recommend use of Afrin prior to the flight only. In general would not use Afrin other than 1 day or so when flying.     Sherlyn Almanzar MD   Internal Medicine/Pediatrics

## 2020-02-23 ASSESSMENT — ENCOUNTER SYMPTOMS
MYALGIAS: 1
MUSCLE WEAKNESS: 0
SINUS CONGESTION: 1
COUGH DISTURBING SLEEP: 1
SNORES LOUDLY: 0
POSTURAL DYSPNEA: 0
SORE THROAT: 0
MUSCLE CRAMPS: 1
ARTHRALGIAS: 1
DYSPNEA ON EXERTION: 0
COUGH: 1
JOINT SWELLING: 1
HOARSE VOICE: 1
SHORTNESS OF BREATH: 0
STIFFNESS: 0
HEMOPTYSIS: 0
WHEEZING: 0
TROUBLE SWALLOWING: 0
TASTE DISTURBANCE: 0
SMELL DISTURBANCE: 0
NECK PAIN: 0
BACK PAIN: 1
SPUTUM PRODUCTION: 1
NECK MASS: 0
SINUS PAIN: 1

## 2020-02-26 ENCOUNTER — PRE VISIT (OUTPATIENT)
Dept: ENDOCRINOLOGY | Facility: CLINIC | Age: 70
End: 2020-02-26

## 2020-02-26 ENCOUNTER — OFFICE VISIT (OUTPATIENT)
Dept: ENDOCRINOLOGY | Facility: CLINIC | Age: 70
End: 2020-02-26
Attending: INTERNAL MEDICINE
Payer: MEDICARE

## 2020-02-26 VITALS
BODY MASS INDEX: 27.65 KG/M2 | WEIGHT: 176.2 LBS | SYSTOLIC BLOOD PRESSURE: 135 MMHG | DIASTOLIC BLOOD PRESSURE: 80 MMHG | HEART RATE: 94 BPM | HEIGHT: 67 IN

## 2020-02-26 DIAGNOSIS — M85.9 LOW BONE DENSITY: ICD-10-CM

## 2020-02-26 DIAGNOSIS — Z92.3 PERSONAL HISTORY OF IRRADIATION, PRESENTING HAZARDS TO HEALTH: ICD-10-CM

## 2020-02-26 DIAGNOSIS — E04.1 THYROID NODULE: ICD-10-CM

## 2020-02-26 DIAGNOSIS — E04.1 THYROID NODULE: Primary | ICD-10-CM

## 2020-02-26 DIAGNOSIS — R73.01 ABNORMAL FASTING GLUCOSE: ICD-10-CM

## 2020-02-26 LAB — HBA1C MFR BLD: 5.7 % (ref 0–5.6)

## 2020-02-26 PROCEDURE — 86376 MICROSOMAL ANTIBODY EACH: CPT

## 2020-02-26 ASSESSMENT — MIFFLIN-ST. JEOR: SCORE: 1339.56

## 2020-02-26 ASSESSMENT — PAIN SCALES - GENERAL: PAINLEVEL: NO PAIN (0)

## 2020-02-26 NOTE — PROGRESS NOTES
Endocrine Consult note    Attending Assessment/Plan :     Multiple thyroid nodules, all subcm  Right ishtmus # 2 is the most suspicious - subcm, anterior surface.  Usually we wouldn't FNAB nodules < 1 cm.   She has history of radiation exposure as a child which increases her risk  Discussed  RTC 6 months with US prior  Telephone visit is an option for this.   Labs to include calcitonin, TPO    Adendum:   Labs 2/26/2020 calcitonin < 2, TPO < 10, HgbA1c 5.7%    Addendum: review of US dated 9/10/2020 compared with 12/19  Right superior posterior 1 x 0.6 x 1.1 cm anechoic (was 0.6 x 0.9 x 1.1 cm)  Right anterior # 2 0.6 x 0.4 x 0.8 cm (was 0.7 x 0.5 x 0.8 cm )- ? microcalc  Right inferior # 3  0.6 x 0.4 x 0.5 cm   Left anterior inferior #1  0.7 x 0.7 x 0.7 hypoechoic  (was 0.7 x 0.6 x 0.7 cm )  Left # 2 0.5 x 0.3 x 0.7 cm hypoechoic (was 0.5 x 0.3 x 0.6 cm)  Right level 3 # 1 0.7 x 0.3 x 0.9 cm   Right level 4 # 1 0.9 x 0.5 x 0.7 cm   Right level 4 # 2 0.8 x 0.4 x 1.1 cm   Right level 5 # 1 0.7 x 0.4 x 0.5 cm Echogenic hilum  Right level 7 # 1 1.3 x 0.7 x 1.1 cm  Left level 3  0.9 x 0.5 x 2 cm (was 0.8 x 0.4 x 1.7 cm)  Left level 7 # 1 1.1 x 0.6 x 0.9 cm      perithyroidal adenopathy- Repeat with next US in 6 months.     Low bone density on 12/19 DXA. Family history of hip fracture in her mother.  She meets treatment indications by FRAX 5.9%  hip. (18% major osteoporos)  Recommend consideration of alendronate.  Refer back to Dr Almanzar    Post menopausal    Abnormal fasting glucose noted 12/11/19 with lipid draw. A1c with labs    History of radiation exposure presenting health risks - [employer] fluroscope exposure as a child approx age 4    Olga Murry MD    Chief complaint:  [place] is a 69 year old female seen in consultation at the request of Dr Sherlyn Almanzar for nontoxic multinodular goiter. I have reviewed Care Everywhere including [facility], [facility] lab reports, imaging reports and provider notes as  "indicated.      HISTORY OF PRESENT ILLNESS  Ashtyn presents today along with her .     Thyroid nodule was lst seen on an MRI (unclear where or when, I can't find primary documentation)  Because of this, thyroid US was obtained in 12/19 and she was referred on here. Ashtyn has never been told of thyroid     We have the following labs:   10/6/16 HgbA1c 5.6%  12/11/19 TSH 2.58, creatinine 0.7, Ca 9.4, glucose 106, cholesterol 262,     Ashtyn recalls exposure to the [employer] fluoroscope at [facility], when she was approximately 4 years of age    I have reviewed images on pACS  12/19/19 DXA : lowest T-score -2.4 L1-L2 spine.  There is a history of wrist fracture age 4.  There is a family history of hip fracture.  FRAX: 18/5.9% not including chlidhood fracture.  If I include that the major osteoporotic fracture risk is > 20%  12/19/19 thyroid US:   Right nodule # 1 0.6 x 0.9 x 1.1 anechoic cyst  Right # 2 0.7 x 0.5 x 0.8 microcalc , anterior border  Left # 1 0.7 x 0.6 x 0.7 cm hypoechoic  Left # 2 0.5 x 0.3 x 0.6 cm hypoechoic  Rightl level 6 # 1 LN  1.1 x 0.6 x 1.7 cm   Left level 3 # 1 0.4 x 0.8 x 1.7 cm  Left level 6 # 1 superior 0.5 x 0.7 x 1.3 cm       REVIEW OF SYSTEMS  Weight is thickening centrally abdomen- weight gain; intermittent fasting from 8 PM to noon  Exercise a lot  Energy a little lower   Sleep is pretty good  Feels like something in throat - ? Sinus  Voice can't sing anymore x 20+ years   Feels left level 2 mass x while  Menopause in her 40s \"they put me into it\"    Answers for HPI/ROS submitted by the patient on 2/23/2020   General Symptoms: No  Skin Symptoms: No  HENT Symptoms: Yes  EYE SYMPTOMS: No  HEART SYMPTOMS: No  LUNG SYMPTOMS: Yes  INTESTINAL SYMPTOMS: No  URINARY SYMPTOMS: No  GYNECOLOGIC SYMPTOMS: No  BREAST SYMPTOMS: No  SKELETAL SYMPTOMS: Yes  BLOOD SYMPTOMS: No  NERVOUS SYSTEM SYMPTOMS: No  MENTAL HEALTH SYMPTOMS: No  Ear pain: Yes  Ear discharge: No  Hearing loss: No  Tinnitus: " No  Nosebleeds: No  Congestion: Yes  Sinus pain: Yes  Trouble swallowing: No   Voice hoarseness: Yes  Mouth sores: No  Sore throat: No  Tooth pain: No  Gum tenderness: No  Bleeding gums: No  Change in taste: No  Change in sense of smell: No  Dry mouth: Yes  Hearing aid used: No  Neck lump: No  Cough: Yes  Sputum or phlegm: Yes  Coughing up blood: No  Difficulty breating or shortness of breath: No  Snoring: No  Wheezing: No  Difficulty breathing on exertion: No  Nighttime Cough: Yes  Difficulty breathing when lying flat: No  Back pain: Yes  Muscle aches: Yes  Neck pain: No  Swollen joints: Yes  Joint pain: Yes  Bone pain: No  Muscle cramps: Yes  Muscle weakness: No  Joint stiffness: No  Bone fracture: No    Past Medical History  Past Medical History:   Diagnosis Date     Acrochordon      Atypical chest pain 1999    Normal stress test in 1999, performed for atypical chest pain     Benign nevus of skin     Numerous benign skin nevi     Biceps tendonitis      Bilateral bunions      Breast implant leak     Breast implants complicated by a leak. She then underwent replacement and repair.      Chronic fatigue      H/O urinary incontinence     Mild occasional female urinary incontinence which seems to be both urge and stress related.     History of bilateral cataract extraction      Hx of tubal ligation      Left hip pain     Occasional left hip pain due to osteoarthritis and/or trochanteric bursitis.     Lipoma of back 2014    Chronic lipoma of the left back, status post-surgical resection in January 2014     Low back pain     Occasional low back pain with radicular symptoms into the legs.     Osteoarthritis of knees, bilateral      Osteoarthritis of left hip      Osteopenia 09/13/2013     Postmenopausal status      Pubic bone pain     Musculoskeletal pubic bone pain due to a strain.     Rosacea      Seborrheic keratoses      Skin lesions     Atypical melanocytic lesions, status post multiple biopsies by Dr. Rivera in July  of 2015. These biposies showed a junctional nevus with moderate atypia, which is also a dysplastic nevus and the final one was also a compound nevus, with mild atypia which is also a dysplastic nevus.  She was contacted by Dr. Rivera and was told to follow up with her again in July 2016, for re-evaluation of her skin.     Skin tag      Solar elastosis      Solar lentiginosis      Sternal fracture 2015    Mid-sternal fracture after blunt trauma while falling while running up stepsin May 2015. She may have also had some rib fractures as well. These healed with conservative management.     Symptomatic varicose veins of both lower extremities 2015    Lower extremity venous varicosities, intermittently symptomatic, status post evaluation with Dr. Alonso in General Surgery in the summer of 2015.     Symptomatic varicose veins of both lower extremities     Status post injection and compression sclerotherapy     Syncope 2018     Urticaria     Questionable exercise-induced urticaria     Rosacea  Syncope 2018    Past Surgical History:   Procedure Laterality Date     APPENDECTOMY       BUNIONECTOMY Bilateral 10/2017     CATARACT IOL, RT/LT Right      COLONOSCOPY  2014    repeat in 10 yr     DENTAL SURGERY       MAMMOPLASTY AUGMENTATION Bilateral 1972     RHYTIDECTOMY (FACELIFT)  01/23/2014    neck     SCLEROTHERAPY  10/03/2014    varicose veins     SURGICAL PATHOLOGY EXAM  01/13/2014    lower back     TONSILLECTOMY       TUBAL LIGATION         Medications      Current Outpatient Medications   Medication Sig Dispense Refill     atorvastatin (LIPITOR) 10 MG tablet Take 1 tablet (10 mg) by mouth daily 90 tablet 0     calcium carbonate 600 mg-vitamin D 400 units (CALCIUM 600 + D) 600-400 MG-UNIT per tablet Take 1 tablet by mouth 2 times daily 180 tablet 3     celecoxib (CELEBREX) 200 MG capsule Take 1 capsule (200 mg) by mouth daily 90 capsule 3     cetirizine (ZYRTEC) 10 MG tablet Take 1 tablet (10 mg) by mouth daily 90 tablet 3  "    metroNIDAZOLE (METROGEL) 1 % gel        Omega-3 Fatty Acids (FISH OIL PO)        tretinoin (RETIN-A) 0.1 % cream Apply QHS to face for rosacea       Fish oil  Mg ? sometimes    Allergies  Allergies   Allergen Reactions     Cheese      Fentanyl Other (See Comments)     May have caused pt. To pass out after cataract      Latex Dermatitis     Methohexital Other (See Comments)     May have caused pt. To pass out after cataract      Midazolam Other (See Comments)     May have caused pt. To pass out after cataract surgery     Seasonal Allergies      Family History  family history includes Bladder Cancer (age of onset: 67) in her father; Breast Cancer (age of onset: 53) in her sister; Cerebrovascular Disease in her maternal aunt; Cerebrovascular Disease (age of onset: 95) in her mother; Heart Disease (age of onset: 50) in her brother; Hip fracture in her mother; Hyperlipidemia in her maternal aunt and maternal aunt; Osteoarthritis in her mother; Osteoporosis in her mother.    Social History  Social History     Tobacco Use     Smoking status: Never Smoker     Smokeless tobacco: Never Used   Substance Use Topics     Alcohol use: Yes     Comment: 0-1 per day     Drug use: No     [facility]    Physical Exam  /80   Pulse 94   Ht 1.69 m (5' 6.54\")   Wt 79.9 kg (176 lb 3.2 oz)   BMI 27.98 kg/m    Body mass index is 27.98 kg/m .  GENERAL : pleasant woman In no apparent distress  SKIN: Normal color, normal temperature, texture.  No hirsutism, alopecia or purple striae.     EYES: PERRL, EOMI, No scleral icterus,  No proptosis, conjunctival redness, stare, retraction  MOUTH: Moist, pink; pharynx clear  NECK: No visible masses. No palpable adenopathy, or masses. No carotid bruits.   THYROID:  Palpable, firm, irregular , no palpable nodule.   RESP: Lungs clear to auscultation bilaterally  CARDIAC: Regular rate and rhythm, normal S1 S2, without murmurs, rubs or gallops    ABDOMEN: Normal bowel sounds; soft, " nontender, no HSM or masses       NEURO: awake, alert, responds appropriately to questions.  Cranial nerves intact.  Moves all extremities; Gait normal.  No tremor of the outstretched hand.  DTRs   1/4 ,   EXTREMITIES: No clubbing, cyanosis or edema.    DATA REVIEW    EXAMINATION: US THYROID, 12/19/2019 10:25 AM      COMPARISON: None.     HISTORY: Thyroid nodules     Technique: Grayscale and color ultrasound imaging of the thyroid was  performed.     Findings:    Thyroid parenchyma: Mildly heterogeneous thyroid gland. Mild contour  lobulation.     The right lobe of the thyroid measures: 1.4 x 1.6 x 3.8 cm      The thyroid isthmus measures: 3 mm      The left lobe of the thyroid measures: 1.2 x 1.5 x 3.4 cm      Multiple thyroid nodules as described below.     Right lobe:  Nodule 1: 6 x 9 x 11 mm cystic colloid nodule in the superior  posterior aspect of the right thyroid lobe.     Nodule 2: 7 x 5 x 8 mm hypoechoic area with indistinct margins  adjacent to the isthmus junction. Scattered punctate internal  echogenicities. On color Doppler, there is mild peripheral  vascularity.     Left Lobe:   Nodule 1: 7 x 5 x 7 mm hypoechoic heterogeneous nodule with markedly  increased vascularity on color Doppler.     Nodule 2: 5 x 3 x 6 m mm hypoechoic nodule. No internal vascularity.     Several smaller nodules throughout the thyroid gland.                                                                      Impression:  1. Heterogeneous thyroid gland with lobulated contours. This could be  secondary to sequela of prior thyroiditis or ongoing chronic  thyroiditis. Recommend clinical and laboratory correlation.  2. Multiple subcentimeter thyroid nodules. None of these nodules meet  criteria for tissue sampling at present. Recommend short-term  sonographic follow-up of right #2 and left #1 nodules.     I have personally reviewed the examination and initial interpretation  and I agree with the findings.     GENEVIEVE GUILLEN,  MD      US THYROID 9/10/2020 1:00 PM     COMPARISON: US 12/19/2019     HISTORY: Thyroid nodule; Abnormal fasting glucose; Low bone density;  Personal history of irradiation, presenting hazards to health     FINDINGS:   Thyroid parenchyma: Mildly heterogeneous  The right lobe of the thyroid measures: 1.9 x 1.3 x 3.5 cm  The left lobe of the thyroid measures: 1.5 x 1.4 x 3.5 cm  The thyroid isthmus measures: 0.3 cm     Right Lobe:  Nodule 1:  Location: Upper  Size: 1.0 x 0.6 x 1.1 cm  Composition: Cystic or almost completely cystic (0 points)  Echogenicity: Anechoic (0 points)  Shape: Wider than tall (0 points)  Margin: Smooth (0 points)  Echogenic Foci: None or large comet tail artifact (0 points)  Stability: No significant change in size  TIRADS: TR1 (0 points) Benign     Nodule 2:  Location: Medial  Size: 0.6 x 0.4 x 0.8 cm  Composition: Solid or almost completely solid (2 points)  Echogenicity: Hyperechoic or isoechoic (1 point)  Shape: Wider than tall (0 points)  Margin: Ill-defined (0 points)  Echogenic Foci: None or large comet tail artifact (0 points)  Stability: No significant change in size  TIRADS: TR3 (3 points)      Nodule 3:  Location: Inferior  Size: 0.6 x 0.4 x 0.5 cm  Composition: Solid or almost completely solid (2 points)  Echogenicity: Hypoechoic (2 points)  Shape: Wider than tall (0 points)  Margin: Ill-defined (0 points)  Echogenic Foci: None or large comet tail artifact (0 points)  Stability: New  TIRADS: TR4 (4-6 points)      Left Lobe:     Nodule 1:  Location: Medial  Size: 0.7 x 0.7 x 0.7 cm  Composition: Spongiform (0 points)  Echogenicity: Anechoic (0 points)  Shape: Wider than tall (0 points)  Margin: Smooth (0 points)  Echogenic Foci: None or large comet tail artifact (0 points)  Stability: No significant change in size  TIRADS: TR1 (0 points) Benign        Nodule 2:  Location: Medial  Size: 0.5 x 0.3 x 0.7 cm  Composition: Solid or almost completely solid (2 points)  Echogenicity:  Hyperechoic or isoechoic (1 point)  Shape: Wider than tall (0 points)  Margin: Ill-defined (0 points)  Echogenic Foci: None or large comet tail artifact (0 points)  Stability: No significant change in size  TIRADS: TR3 (3 points)                                                                       Impression:  Multiple subcentimeter thyroid nodules are stable compared to prior  study. None of them meet criteria for FNA according to TIRADS  criteria.         ACR TI-RADS recommendations  TR2 (2 points) & TR1 (0 points) -No FNA or follow-up  TR3 (3 points) - FNA if ? 2.5cm, follow-up if 1.5 -2.4 cm in 1, 3 and  5 years  TR4 (4-6 points) - FNA if ? 1.5cm, follow-up if 1 -1.4 cm in 1, 2, 3  and 5 years  TR5 (?7 points) - FNA if ? 1cm, follow-up if 0.5 -0.9 cm every year  for 5 years      I have personally reviewed the examination and initial interpretation  and I agree with the findings.     CORBIN DEVINE MD

## 2020-02-26 NOTE — LETTER
2/26/2020       RE: Yanely Jon  [address]  [facility] [address]     Dear Colleague,    Thank you for referring your patient, Yanely Jon, to the [facility] ENDOCRINOLOGY at [facility]. Please see a copy of my visit note below.    Endocrine Consult note    Attending Assessment/Plan :     Multiple thyroid nodules, all subcm  Right ishtmus # 2 is the most suspicious - subcm, anterior surface.  Usually we wouldn't FNAB nodules < 1 cm.   She has history of radiation exposure as a child which increases her risk  Discussed  RTC 6 months with US prior  Telephone visit is an option for this.   Labs to include calcitonin, TPO    perithyroidal adenopathy- Repeat with next US in 6 months.     Low bone density on 12/19 DXA. Family history of hip fracture in her mother.  She meets treatment indications by FRAX 5.9%  hip. (18% major osteoporos)  Recommend consideration of alendronate.  Refer back to Dr Almanzar    Post menopausal    Abnormal fasting glucose noted 12/11/19 with lipid draw. A1c with labs    History of radiation exposure presenting health risks - Shoe store fluroscope exposure as a child approx age 4    Olga Murry MD    Chief complaint:  [place] is a 69 year old female seen in consultation at the request of Dr Sherlyn Almanzar for nontoxic multinodular goiter. I have reviewed Care Everywhere including [facility], [facility] lab reports, imaging reports and provider notes as indicated.      HISTORY OF PRESENT ILLNESS  Ashtyn presents today along with her .     Thyroid nodule was lst seen on an MRI (unclear where or when, I can't find primary documentation)  Because of this, thyroid US was obtained in 12/19 and she was referred on here. Ashtyn has never been told of thyroid     We have the following labs:   10/6/16 HgbA1c 5.6%  12/11/19 TSH 2.58, creatinine 0.7, Ca 9.4, glucose 106, cholesterol 262,     Ashtyn recalls exposure to the shoe  "store fluoroscope at [facility], when she was approximately 4 years of age    I have reviewed images on pACS  12/19/19 DXA : lowest T-score -2.4 L1-L2 spine.  There is a history of wrist fracture age 4.  There is a family history of hip fracture.  FRAX: 18/5.9% not including chlidhood fracture.  If I include that the major osteoporotic fracture risk is > 20%  12/19/19 thyroid US:   Right nodule # 1 0.6 x 0.9 x 1.1 anechoic cyst  Right # 2 0.7 x 0.5 x 0.8 microcalc , anterior border  Left # 1 0.7 x 0.6 x 0.7 cm hypoechoic  Left # 2 0.5 x 0.3 x 0.6 cm hypoechoic  Rightl level 6 # 1 LN  1.1 x 0.6 x 1.7 cm   Left level 3 # 1 0.4 x 0.8 x 1.7 cm  Left level 6 # 1 superior 0.5 x 0.7 x 1.3 cm       REVIEW OF SYSTEMS  Weight is thickening centrally abdomen- weight gain; intermittent fasting from 8 PM to noon  Exercise a lot  Energy a little lower   Sleep is pretty good  Feels like something in throat - ? Sinus  Voice can't sing anymore x 20+ years   Feels left level 2 mass x while  Menopause in her 40s \"they put me into it\"      Past Medical History  Past Medical History:   Diagnosis Date     Acrochordon      Atypical chest pain 1999    Normal stress test in 1999, performed for atypical chest pain     Benign nevus of skin     Numerous benign skin nevi     Biceps tendonitis      Bilateral bunions      Breast implant leak     Breast implants complicated by a leak. She then underwent replacement and repair.      Chronic fatigue      H/O urinary incontinence     Mild occasional female urinary incontinence which seems to be both urge and stress related.     History of bilateral cataract extraction      Hx of tubal ligation      Left hip pain     Occasional left hip pain due to osteoarthritis and/or trochanteric bursitis.     Lipoma of back 2014    Chronic lipoma of the left back, status post-surgical resection in January 2014     Low back pain     Occasional low back pain with radicular symptoms into the legs.     Osteoarthritis of " knees, bilateral      Osteoarthritis of left hip      Osteopenia 09/13/2013     Postmenopausal status      Pubic bone pain     Musculoskeletal pubic bone pain due to a strain.     Rosacea      Seborrheic keratoses      Skin lesions     Atypical melanocytic lesions, status post multiple biopsies by Dr. Rivera in July of 2015. These biposies showed a junctional nevus with moderate atypia, which is also a dysplastic nevus and the final one was also a compound nevus, with mild atypia which is also a dysplastic nevus.  She was contacted by Dr. Rivera and was told to follow up with her again in July 2016, for re-evaluation of her skin.     Skin tag      Solar elastosis      Solar lentiginosis      Sternal fracture 2015    Mid-sternal fracture after blunt trauma while falling while running up stepsin May 2015. She may have also had some rib fractures as well. These healed with conservative management.     Symptomatic varicose veins of both lower extremities 2015    Lower extremity venous varicosities, intermittently symptomatic, status post evaluation with Dr. Alonso in General Surgery in the summer of 2015.     Symptomatic varicose veins of both lower extremities     Status post injection and compression sclerotherapy     Syncope 2018     Urticaria     Questionable exercise-induced urticaria     Rosacea  Syncope 2018    Past Surgical History:   Procedure Laterality Date     APPENDECTOMY       BUNIONECTOMY Bilateral 10/2017     CATARACT IOL, RT/LT Right      COLONOSCOPY  2014    repeat in 10 yr     DENTAL SURGERY       MAMMOPLASTY AUGMENTATION Bilateral 1972     RHYTIDECTOMY (FACELIFT)  01/23/2014    neck     SCLEROTHERAPY  10/03/2014    varicose veins     SURGICAL PATHOLOGY EXAM  01/13/2014    lower back     TONSILLECTOMY       TUBAL LIGATION         Medications      Current Outpatient Medications   Medication Sig Dispense Refill     atorvastatin (LIPITOR) 10 MG tablet Take 1 tablet (10 mg) by mouth daily 90 tablet 0      "calcium carbonate 600 mg-vitamin D 400 units (CALCIUM 600 + D) 600-400 MG-UNIT per tablet Take 1 tablet by mouth 2 times daily 180 tablet 3     celecoxib (CELEBREX) 200 MG capsule Take 1 capsule (200 mg) by mouth daily 90 capsule 3     cetirizine (ZYRTEC) 10 MG tablet Take 1 tablet (10 mg) by mouth daily 90 tablet 3     metroNIDAZOLE (METROGEL) 1 % gel        Omega-3 Fatty Acids (FISH OIL PO)        tretinoin (RETIN-A) 0.1 % cream Apply QHS to face for rosacea       Fish oil  Mg ? sometimes    Allergies  Allergies   Allergen Reactions     Cheese      Fentanyl Other (See Comments)     May have caused pt. To pass out after cataract      Latex Dermatitis     Methohexital Other (See Comments)     May have caused pt. To pass out after cataract      Midazolam Other (See Comments)     May have caused pt. To pass out after cataract surgery     Seasonal Allergies      Family History  family history includes Bladder Cancer (age of onset: 67) in her father; Breast Cancer (age of onset: 53) in her sister; Cerebrovascular Disease in her maternal aunt; Cerebrovascular Disease (age of onset: 95) in her mother; Heart Disease (age of onset: 50) in her brother; Hip fracture in her mother; Hyperlipidemia in her maternal aunt and maternal aunt; Osteoarthritis in her mother; Osteoporosis in her mother.    Social History  Social History     Tobacco Use     Smoking status: Never Smoker     Smokeless tobacco: Never Used   Substance Use Topics     Alcohol use: Yes     Comment: 0-1 per day     Drug use: No     Lives [place] [facility]    Physical Exam  /80   Pulse 94   Ht 1.69 m (5' 6.54\")   Wt 79.9 kg (176 lb 3.2 oz)   BMI 27.98 kg/m     Body mass index is 27.98 kg/m .  GENERAL : pleasant woman In no apparent distress  SKIN: Normal color, normal temperature, texture.  No hirsutism, alopecia or purple striae.     EYES: PERRL, EOMI, No scleral icterus,  No proptosis, conjunctival redness, stare, retraction  MOUTH: Moist, pink; pharynx " clear  NECK: No visible masses. No palpable adenopathy, or masses. No carotid bruits.   THYROID:  Palpable, firm, irregular , no palpable nodule.   RESP: Lungs clear to auscultation bilaterally  CARDIAC: Regular rate and rhythm, normal S1 S2, without murmurs, rubs or gallops    ABDOMEN: Normal bowel sounds; soft, nontender, no HSM or masses       NEURO: awake, alert, responds appropriately to questions.  Cranial nerves intact.  Moves all extremities; Gait normal.  No tremor of the outstretched hand.  DTRs   1/4 ,   EXTREMITIES: No clubbing, cyanosis or edema.    DATA REVIEW    EXAMINATION: US THYROID, 12/19/2019 10:25 AM      COMPARISON: None.     HISTORY: Thyroid nodules     Technique: Grayscale and color ultrasound imaging of the thyroid was  performed.     Findings:    Thyroid parenchyma: Mildly heterogeneous thyroid gland. Mild contour  lobulation.     The right lobe of the thyroid measures: 1.4 x 1.6 x 3.8 cm      The thyroid isthmus measures: 3 mm      The left lobe of the thyroid measures: 1.2 x 1.5 x 3.4 cm      Multiple thyroid nodules as described below.     Right lobe:  Nodule 1: 6 x 9 x 11 mm cystic colloid nodule in the superior  posterior aspect of the right thyroid lobe.     Nodule 2: 7 x 5 x 8 mm hypoechoic area with indistinct margins  adjacent to the isthmus junction. Scattered punctate internal  echogenicities. On color Doppler, there is mild peripheral  vascularity.     Left Lobe:   Nodule 1: 7 x 5 x 7 mm hypoechoic heterogeneous nodule with markedly  increased vascularity on color Doppler.     Nodule 2: 5 x 3 x 6 m mm hypoechoic nodule. No internal vascularity.     Several smaller nodules throughout the thyroid gland.                                                                      Impression:  1. Heterogeneous thyroid gland with lobulated contours. This could be  secondary to sequela of prior thyroiditis or ongoing chronic  thyroiditis. Recommend clinical and laboratory correlation.  2.  Multiple subcentimeter thyroid nodules. None of these nodules meet  criteria for tissue sampling at present. Recommend short-term  sonographic follow-up of right #2 and left #1 nodules.     I have personally reviewed the examination and initial interpretation  and I agree with the findings.     GENEVIEVE GUILLEN MD      Again, thank you for allowing me to participate in the care of your patient.      Sincerely,    Olga Murry MD

## 2020-02-26 NOTE — PATIENT INSTRUCTIONS
Labs today    US in 6 months and see me afterwards    ___________    INFORMATION FOR PATIENTS  THYROID NODULES AND   FINE NEEDLE ASPIRATION BIOPSY OF THE THYROID    The finding of a thyroid nodule is almost never an emergency.  Thyroid nodules are common, occurring in up to 50% of patients over the age of 50 years.      Innocent (not important enough to have been detected during life) thyroid cancer may be found in around 5% of people.   Likewise, about 5-15% of patients with demonstrated thyroid nodules will prove to have thyroid cancer if subjected to aggressive diagnostic measures.  As a rule, thyroid cancer has an excellent prognosis.  Therefore, in each patient with thyroid nodules, the decision has to be made about how important it is to identify and treat a cancer, if present.      When we find nodules on the thyroid we use ultrasound and either palpation or ultrasound guided biopsy to help determine which patients, from the large number with nodules on the thyroid, are in the group containing an important thyroid cancer.      Ultrasound Guided Fine Needle Aspiration Biopsy of the Thyroid uses the ultrasound eye to see the nodule and the needle during the biopsy procedure.  This procedure is performed in the radiology department.  The radiologist uses a small needle to remove cells from the specific area of the thyroid. The cells are then analyzed under the microscope to determine whether or not they might be cancer.      The results of thyroid biopsy come in 4 categories    1. Benign or negative for cancer.  This is most common result, found in approximately 60-70% of biopsy specimens.  There remains a < 6 % chance that this result is wrong.    2. Positive for cancer.  This is found about 5 % of the time. There remains a  < 1% chance that cancer is not present when we make this diagnosis by biopsy. This result leads to a recommendation for surgery to make the final diagnosis of cancer.     3. Indeterminate,  or the grey zone.  This is found in approximately 20-30% of patients.  This diagnosis identifies a higher risk group, often resulting in diagnostic surgery to establish the final diagnosis.  If all patients in this group go to surgery, only 10-30%, on average, prove to have cancer.  This group is subdivided into 3 subcategories: atypia of undetermined significance, follicular neoplasm and suspicious, with increasing risk.      4. Insufficient for diagnosis. This is found in 5-10% of biopsies. When this occurs we need to repeat the biopsy.      The greatest risk of thyroid biopsy is that the result is not clear (that it is in the indeterminate grey zone group), resulting in future thyroid surgery for what is likely to be benign thyroid disease.  There is a small risk of bleeding or infection.      If your doctor has recommended you have an ultrasound guided fine needle aspiration biopsy of the thyroid, your appointment may be scheduled by calling [phone].  Be sure to specify that the procedure is to be ultrasound-guided and that it is to be scheduled in radiology      _______________    Thank you for choosing [facility] for your health care needs. Below is some information for patients who are interested in having their follow-up visit with a physician by telephone. In some cases, a telephone visit can be an effective and convenient way to manage your follow-up care. Choosing a telephone visit rather than a face to face visit for your follow-up care is a decision that you and your physician can make together to ensure it meets all of your needs.  A face to face visit is always an available option, if you choose to do so.     We want to make sure you have all of the information you need about the telephone visit option and answer all of your questions before you decide to schedule a telephone follow-up visit. If you have any questions, you may talk to a staff member or our financial  counselor at [phone].    1.  General overview  Our clinic sees patients for a variety of conditions and concerns. A face to face visit with your doctor is required for any new concerns or for your initial visit. If you and your doctor decide that a follow up visit by telephone is appropriate, you may decide to opt for a telephone visit.     2.  Billing and insurance coverage  There is a charge for telephone visits, similar to the charge for an in-person visit. Your bill is based on the amount of time you and your physician are on the phone. We will bill each visit to your insurance company (just like your other medical visits), and you will be responsible for any costs not paid by your insurance company. The charge may be denied by your insurance company, in which case you will be responsible for the entire amount billed. The decision to cover the cost is determined by your insurance company. If you want to know what your insurance company will cover, we encourage you to contact them to determine your coverage. The codes below are the codes we use when billing for telephone visits and the associated charges. This may help you work with your insurance company to determine your benefits.   Billing CPT codes for Telephone visits    94913 ($30), 48121 ($35), 15187 ($40)     3. Updating your consent form  You may get contacted by a staff member about updating your consent form. If it needs updating prior to your telephone visit, please visit the link below, print it and fill it out and return it to us at [facility], Mail Code [id number], [address] [place] [address].   [email or web address]      _______________________      You qualify for treatment of the low bone density based on your FRAX scores of 18% risk of major osteoporotic fracture and 5.9% risk of hip fracture    CALCIUM Recommendation 1200 mg/day in divided dose of no more than 500 mg/dose. This includes what is in  your food and also what is in any supplements you are taking.      Dietary sources of calcium: These also contain vitamin D  Milk / buttermilk              8 oz            300 mg  Dry milk powder       5 Tbsp             300 mg  Yogurt                          1 cup           400 mg  Ice cream   1/2 cup          100 mg  Hard cheese                     1.5 oz          300 mg  Cottage cheese                1/2 cup        65 mg  Spinach, cooked  1 cup  240 mg  Tofu, soft regular           4 oz          120 to 390 mg  Sardines                       3 oz               370 mg  Mackerel canned         3 oz                250 mg  Canned salmon with bones 3 oz        170-210 mg  Calcium fortified cereals are available  SILK soy and almond milk products are calcium fortified  Orange juice  Fortified with Calcium       8 oz            300 mg  Low fat dairy sources are recommended      Recommended exercise goals:    30 minutes of moderate exercise on most days of the week .  Weight bearing exercise (ie, walking, jogging, hiking, dancing)    Strength training 2 or more times/week in addition to other weight -being exercise.  Strength training -- uses weight or resistance beyond that seen in everyday activities -(pilates, weight training with free weights, weight machines or resistance bands)    _______

## 2020-02-27 DIAGNOSIS — M85.88 OSTEOPENIA OF SPINE: ICD-10-CM

## 2020-02-27 DIAGNOSIS — E78.5 HYPERLIPIDEMIA LDL GOAL <130: ICD-10-CM

## 2020-02-27 DIAGNOSIS — M85.88 OSTEOPENIA OF LUMBAR SPINE: Primary | ICD-10-CM

## 2020-02-27 LAB — THYROPEROXIDASE AB SERPL-ACNC: <10 IU/ML

## 2020-02-27 RX ORDER — ATORVASTATIN CALCIUM 10 MG/1
10 TABLET, FILM COATED ORAL DAILY
Qty: 90 TABLET | Refills: 3 | Status: SHIPPED | OUTPATIENT
Start: 2020-02-27 | End: 2020-12-14

## 2020-02-27 RX ORDER — ALENDRONATE SODIUM 70 MG/1
70 TABLET ORAL
Qty: 12 TABLET | Refills: 3 | Status: SHIPPED | OUTPATIENT
Start: 2020-02-27 | End: 2020-12-14

## 2020-02-29 LAB — CALCIT SERPL-MCNC: <2 PG/ML (ref 0–5.1)

## 2020-05-19 ENCOUNTER — TRANSFERRED RECORDS (OUTPATIENT)
Dept: HEALTH INFORMATION MANAGEMENT | Facility: CLINIC | Age: 70
End: 2020-05-19

## 2020-07-28 ENCOUNTER — TELEPHONE (OUTPATIENT)
Dept: FAMILY MEDICINE | Facility: CLINIC | Age: 70
End: 2020-07-28

## 2020-07-28 NOTE — TELEPHONE ENCOUNTER
[facility] Call Center    Phone Message    May a detailed message be left on voicemail: yes     Reason for Call: Other: Patient believes she may have toenail fungus and wondering if she can get medication for it.      Action Taken: Message routed to:  [facility]    Travel Screening: Not Applicable

## 2020-07-30 ENCOUNTER — OFFICE VISIT (OUTPATIENT)
Dept: FAMILY MEDICINE | Facility: CLINIC | Age: 70
End: 2020-07-30
Payer: MEDICARE

## 2020-07-30 VITALS
HEIGHT: 67 IN | BODY MASS INDEX: 27.03 KG/M2 | WEIGHT: 172.25 LBS | SYSTOLIC BLOOD PRESSURE: 135 MMHG | DIASTOLIC BLOOD PRESSURE: 85 MMHG | OXYGEN SATURATION: 96 % | TEMPERATURE: 97.9 F | HEART RATE: 103 BPM

## 2020-07-30 DIAGNOSIS — B35.1 ONYCHOMYCOSIS: Primary | ICD-10-CM

## 2020-07-30 RX ORDER — TERBINAFINE HYDROCHLORIDE 250 MG/1
250 TABLET ORAL DAILY
Qty: 90 TABLET | Refills: 0 | Status: SHIPPED | OUTPATIENT
Start: 2020-07-30 | End: 2020-10-28

## 2020-07-30 ASSESSMENT — MIFFLIN-ST. JEOR: SCORE: 1316.01

## 2020-07-30 NOTE — NURSING NOTE
"70 year old  Chief Complaint   Patient presents with     Toenail     blilateral possible toenial fugus and harden at the nail ongoing / would like reccomendations or medications        Blood pressure (!) 146/86, pulse 103, temperature 97.9  F (36.6  C), temperature source Temporal, height 1.689 m (5' 6.5\"), weight 78.1 kg (172 lb 4 oz), SpO2 96 %. Body mass index is 27.39 kg/m .  Patient Active Problem List   Diagnosis     Cervicalgia     Thyroid nodule     Primary osteoarthritis of knee     Seasonal allergic rhinitis     Rosacea     Symptomatic varicose veins of both lower extremities     Osteopenia of spine     Abnormal fasting glucose     Low bone density     Personal history of irradiation, presenting hazards to health       Wt Readings from Last 2 Encounters:   07/30/20 78.1 kg (172 lb 4 oz)   02/26/20 79.9 kg (176 lb 3.2 oz)     BP Readings from Last 3 Encounters:   07/30/20 (!) 146/86   02/26/20 135/80   02/10/20 (!) 142/96         Current Outpatient Medications   Medication     alendronate (FOSAMAX) 70 MG tablet     atorvastatin (LIPITOR) 10 MG tablet     calcium carbonate 600 mg-vitamin D 400 units (CALCIUM 600 + D) 600-400 MG-UNIT per tablet     celecoxib (CELEBREX) 200 MG capsule     cetirizine (ZYRTEC) 10 MG tablet     metroNIDAZOLE (METROGEL) 1 % gel     Omega-3 Fatty Acids (FISH OIL PO)     tretinoin (RETIN-A) 0.1 % cream     No current facility-administered medications for this visit.        Social History     Tobacco Use     Smoking status: Never Smoker     Smokeless tobacco: Never Used   Substance Use Topics     Alcohol use: Yes     Comment: 0-1 per day     Drug use: No       Health Maintenance Due   Topic Date Due     ZOSTER IMMUNIZATION (3 of 3) 01/18/2020       No results found for: PAP      July 30, 2020 8:26 AM  "

## 2020-07-30 NOTE — PATIENT INSTRUCTIONS
Patient Education     Understanding Thickened Nails    There are several causes of very thick or crumbling nails. They can be caused by injuries or pressure from shoes. Fungal infections are a common cause. Diabetes, psoriasis, or vascular disease are other possible causes.  Symptoms  Along with thickening, the nail may appear ridged, brittle, or yellowish. It may also feel painful when pressure is put on it. After a while, a thickened nail may loosen and fall off.  Evaluation  Because thickened nails may be a symptom of a medical condition, your healthcare provider will look at your medical history. A test may be done to check for fungal infection. The thickness and color of the nail are examined carefully. This helps determine the cause.  Treatment    For infection: Oral or topical antifungal medicines may be used to treat infection. These can help prevent sores under the nail. They also keep the fungus from spreading to other nails.    For thick nails not caused by infection: Thinning the nail may be an option. This can be done by trimming, filing, or grinding.    For pain: If the nail causes pain, part or all of the nail can be removed with surgery. Never try to remove a nail by yourself.  Prevention  Many nail problems can be prevented by wearing the right shoes and trimming your nails properly. Keep your feet clean and dry to help avoid infection. If you have diabetes, talk with your healthcare provider before doing any foot self-care.    The right shoes: Get your feet measured. Your size may change as you age. This is due to ligaments that loosen with age and allow the bones in your foot to change position or spread. Wear shoes that are supportive and roomy enough for your toes to wiggle. Look for shoes made of natural materials, such as leather, which allow your feet to breathe.    Proper trimming: To avoid problems, trim your toenails straight across. Then file the edges with a nail file. If you can t  trim your own nails, ask your healthcare provider to do so for you.  Date Last Reviewed: 10/1/2016    7989-6285 [employer] [address] [place] [address], [place] [address]. All rights reserved. This information is not intended as a substitute for professional medical care. Always follow your healthcare professional's instructions.           Patient Education     Nail Fungal Infection  A nail fungal infection changes the way fingernails and toenails look. They may thicken, discolor, change shape, or split. This condition is hard to treat because nails grow slowly and have limited blood supply. The infection often comes back after treatment.  There are 2 types of medicines used to treat this condition:    Topical anti-fungal medicines. These are applied to the surface of the skin and nail area. These medicines are not very effective because they can t get deep into the nail.    Oral antifungal medicines. These medicines work better because they go into the nail from the inside out. But the infection may still come back. It may take 9 to 12 months for your nail to look normal again. This means you are cured. You can repeat treatment if needed. Most people take these medicines without any problems. It is rare to stop therapy because of side effects. But your healthcare provider may give you some monitoring tests. Talk about possible side effects with your provider before starting treatment.  If medicines fail, the nail can be removed surgically or chemically. These methods physically remove the fungus from the body. This helps medical treatment be more effective.  Home care    Use medicines exactly as directed for as long as directed. Treating a fungal infection can take longer than other kinds of infections.    Smoking is a risk factor for fungal infection. This is one more reason to quit.    Wear absorbent socks, and shoes that let your feet breathe. Sweaty feet increase your risk of fungal infection. They  also make an existing infection harder to treat.    Use footwear when in damp public places like swimming pools, gyms, and shower rooms. This will help you avoid the fungus that grows there.    Don't share nail clippers or scissors with others.  Follow-up care  Follow up with your healthcare provider, or as advised.  When to seek medical advice  Call your healthcare provider right away if any of these occur:    Skin by the nail becomes red, swollen, painful, or drains pus (a creamy yellow or white liquid)    Side effects from oral anti-fungal medicines  Date Last Reviewed: 8/1/2016 2000-2019 [employer]. [address] [place], [place] [id number]. All rights reserved. This information is not intended as a substitute for professional medical care. Always follow your healthcare professional's instructions.           Patient Education     Understanding Thickened Nails    There are several causes of very thick or crumbling nails. They can be caused by injuries or pressure from shoes. Fungal infections are a common cause. Diabetes, psoriasis, or vascular disease are other possible causes.  Symptoms  Along with thickening, the nail may appear ridged, brittle, or yellowish. It may also feel painful when pressure is put on it. After a while, a thickened nail may loosen and fall off.  Evaluation  Because thickened nails may be a symptom of a medical condition, your healthcare provider will look at your medical history. A test may be done to check for fungal infection. The thickness and color of the nail are examined carefully. This helps determine the cause.  Treatment    For infection: Oral or topical antifungal medicines may be used to treat infection. These can help prevent sores under the nail. They also keep the fungus from spreading to other nails.    For thick nails not caused by infection: Thinning the nail may be an option. This can be done by trimming, filing, or grinding.    For pain: If the nail  causes pain, part or all of the nail can be removed with surgery. Never try to remove a nail by yourself.  Prevention  Many nail problems can be prevented by wearing the right shoes and trimming your nails properly. Keep your feet clean and dry to help avoid infection. If you have diabetes, talk with your healthcare provider before doing any foot self-care.    The right shoes: Get your feet measured. Your size may change as you age. This is due to ligaments that loosen with age and allow the bones in your foot to change position or spread. Wear shoes that are supportive and roomy enough for your toes to wiggle. Look for shoes made of natural materials, such as leather, which allow your feet to breathe.    Proper trimming: To avoid problems, trim your toenails straight across. Then file the edges with a nail file. If you can t trim your own nails, ask your healthcare provider to do so for you.  Date Last Reviewed: 10/1/2016    6208-8265 [employer]. [address] [place] [address]. All rights reserved. This information is not intended as a substitute for professional medical care. Always follow your healthcare professional's instructions.           Patient Education     Patient Education    Terbinafine Hydrochloride Oral granules    Terbinafine Hydrochloride Oral tablet    Terbinafine Hydrochloride Topical cream    Terbinafine Hydrochloride Topical gel    Terbinafine Hydrochloride Topical solution, spray  Terbinafine Hydrochloride Oral tablet  What is this medicine?  TERBINAFINE (TER bin a feen) is an antifungal medicine. It is used to treat certain kinds of fungal or yeast infections.  This medicine may be used for other purposes; ask your health care provider or pharmacist if you have questions.  What should I tell my health care provider before I take this medicine?  They need to know if you have any of these conditions:    drink alcoholic beverages    kidney disease    liver disease    an  unusual or allergic reaction to terbinafine, other medicines, foods, dyes, or preservatives    pregnant or trying to get pregnant    breast-feeding  How should I use this medicine?  Take this medicine by mouth with a full glass of water. Follow the directions on the prescription label. You can take this medicine with food or on an empty stomach. Take your medicine at regular intervals. Do not take your medicine more often than directed. Do not skip doses or stop your medicine early even if you feel better. Do not stop taking except on your doctor's advice.  Talk to your pediatrician regarding the use of this medicine in children. Special care may be needed.  Overdosage: If you think you have taken too much of this medicine contact a poison control center or emergency room at once.  NOTE: This medicine is only for you. Do not share this medicine with others.  What if I miss a dose?  If you miss a dose, take it as soon as you can. If it is almost time for your next dose, take only that dose. Do not take double or extra doses.  What may interact with this medicine?  Do not take this medicine with any of the following medications:    thioridazine  This medicine may also interact with the following medications:    beta-blockers    caffeine    cimetidine    cyclosporine    medicines for depression, anxiety, or psychotic disturbances    medicines for fungal infections like fluconazole and ketoconazole    medicines for irregular heartbeat like amiodarone, flecainide and propafenone    rifampin    warfarin  This list may not describe all possible interactions. Give your health care provider a list of all the medicines, herbs, non-prescription drugs, or dietary supplements you use. Also tell them if you smoke, drink alcohol, or use illegal drugs. Some items may interact with your medicine.  What should I watch for while using this medicine?  Visit your doctor or health care provider regularly. Tell your doctor right away if  you have nausea or vomiting, loss of appetite, stomach pain on your right upper side, yellow skin, dark urine, light stools, or are over tired. Some fungal infections need many weeks or months of treatment to cure. If you are taking this medicine for a long time, you will need to have important blood work done.  What side effects may I notice from receiving this medicine?  Side effects that you should report to your doctor or health care professional as soon as possible:    allergic reactions like skin rash or hives, swelling of the face, lips, or tongue    changes in vision    dark urine    fever or infection    general ill feeling or flu-like symptoms    light-colored stools    loss of appetite, nausea    redness, blistering, peeling or loosening of the skin, including inside the mouth    right upper belly pain    unusually weak or tired    yellowing of the eyes or skin  Side effects that usually do not require medical attention (report to your doctor or health care professional if they continue or are bothersome):    changes in taste    diarrhea    hair loss    muscle or joint pain    stomach gas    stomach upset  This list may not describe all possible side effects. Call your doctor for medical advice about side effects. You may report side effects to FDA at 2-066-FDA-1738.  Where should I keep my medicine?  Keep out of the reach of children.  Store at room temperature below 25 degrees C (77 degrees F). Protect from light. Throw away any unused medicine after the expiration date.  NOTE:This sheet is a summary. It may not cover all possible information. If you have questions about this medicine, talk to your doctor, pharmacist, or health care provider. Copyright  2016 [employer]

## 2020-07-30 NOTE — PROGRESS NOTES
Subjective     Yanely Jon is a 70 year old female with a history of osteoporosis and rosacea whose PCP is Dr. Almanzar. She presents to clinic today for the following health issues:    Toenail concerns: Very thick toenails for years.  Has been cutting them back very drastically. Has used every OTC treatment available but it does not make them better.  She has removed one of her toenails which then grew back normally.  No redness or pain of toes or feet.  Children have had similar issues. She is interested in oral treatment.      Patient Active Problem List   Diagnosis     Cervicalgia     Thyroid nodule     Primary osteoarthritis of knee     Seasonal allergic rhinitis     Rosacea     Symptomatic varicose veins of both lower extremities     Osteopenia of spine     Abnormal fasting glucose     Low bone density     Personal history of irradiation, presenting hazards to health     Past Surgical History:   Procedure Laterality Date     APPENDECTOMY       BUNIONECTOMY Bilateral 10/2017     CATARACT IOL, RT/LT Right      COLONOSCOPY  2014    repeat in 10 yr     DENTAL SURGERY       MAMMOPLASTY AUGMENTATION Bilateral 1972     RHYTIDECTOMY (FACELIFT)  01/23/2014    neck     SCLEROTHERAPY  10/03/2014    varicose veins     SURGICAL PATHOLOGY EXAM  01/13/2014    lower back     TONSILLECTOMY       TUBAL LIGATION         Social History     Tobacco Use     Smoking status: Never Smoker     Smokeless tobacco: Never Used   Substance Use Topics     Alcohol use: Yes     Comment: 0-1 per day     Family History   Problem Relation Age of Onset     Osteoarthritis Mother      Cerebrovascular Disease Mother 95     Osteoporosis Mother      Hip fracture Mother      Bladder Cancer Father 67     Breast Cancer Sister 53     Heart Disease Brother 50        stents, was smoker     Hyperlipidemia Maternal Aunt      Cerebrovascular Disease Maternal Aunt      Hyperlipidemia Maternal Aunt      Thyroid Disease No family hx of      Thyroid Cancer  "No family hx of          Current Outpatient Medications   Medication Sig Dispense Refill     alendronate (FOSAMAX) 70 MG tablet Take 1 tablet (70 mg) by mouth every 7 days 12 tablet 3     atorvastatin (LIPITOR) 10 MG tablet Take 1 tablet (10 mg) by mouth daily 90 tablet 3     calcium carbonate 600 mg-vitamin D 400 units (CALCIUM 600 + D) 600-400 MG-UNIT per tablet Take 1 tablet by mouth 2 times daily 180 tablet 3     celecoxib (CELEBREX) 200 MG capsule Take 1 capsule (200 mg) by mouth daily 90 capsule 3     cetirizine (ZYRTEC) 10 MG tablet Take 1 tablet (10 mg) by mouth daily 90 tablet 3     metroNIDAZOLE (METROGEL) 1 % gel        Omega-3 Fatty Acids (FISH OIL PO)        terbinafine (LAMISIL) 250 MG tablet Take 1 tablet (250 mg) by mouth daily 90 tablet 0     tretinoin (RETIN-A) 0.1 % cream Apply QHS to face for rosacea       Allergies   Allergen Reactions     Cheese      Fentanyl Other (See Comments)     May have caused pt. To pass out after cataract      Latex Dermatitis     Methohexital Other (See Comments)     May have caused pt. To pass out after cataract      Midazolam Other (See Comments)     May have caused pt. To pass out after cataract surgery     Seasonal Allergies        Reviewed and updated as needed this visit by Provider  Tobacco  Allergies  Meds  Problems  Med Hx  Surg Hx  Fam Hx         Review of Systems   Constitutional, HEENT, cardiovascular, pulmonary, gi and gu systems are negative, except as otherwise noted.      Objective    /85   Pulse 103   Temp 97.9  F (36.6  C) (Temporal)   Ht 1.689 m (5' 6.5\")   Wt 78.1 kg (172 lb 4 oz)   SpO2 96%   BMI 27.39 kg/m    Body mass index is 27.39 kg/m .  Physical Exam   GENERAL: healthy, alert and no distress  RESP: lungs clear to auscultation - no rales, rhonchi or wheezes  CV: regular rate and rhythm, normal S1 S2, no S3 or S4, no murmur, click or rub, no peripheral edema and peripheral pulses strong  SKIN: 8/10 toenails thickened with " yellow/brown discoloration. All affected nails have been cut so far back into the nail bed there is little to no nail left.        Assessment & Plan       ICD-10-CM    1. Onychomycosis  B35.1 terbinafine (LAMISIL) 250 MG tablet     Limitations of treatment discussed.  Risks and benefits of treatment discussed.  Medication works 70% of the time and there is a good chance or recurrence.  Discussed liver concerns with this medication and recommended limited alcohol while taking meaning no more than an occasional drink.  Had normal LFT 2/2020--did not redraw.  Patient is highly motivated to get this treated.  Advised patient to NOT cut nails so far back--let them grow out a bit    See patient instructions for home care.    Reviewed records regarding endocrine follow up.  She is due for thyroid US and follow up appointment with Dr. Murry.  Scheduling info given.  Future orders are in Epic.      Return if symptoms worsen or fail to improve.    Emilee Fernandez PA-C  [facility]

## 2020-08-27 ENCOUNTER — TRANSFERRED RECORDS (OUTPATIENT)
Dept: HEALTH INFORMATION MANAGEMENT | Facility: CLINIC | Age: 70
End: 2020-08-27

## 2020-09-10 ENCOUNTER — ANCILLARY PROCEDURE (OUTPATIENT)
Dept: ULTRASOUND IMAGING | Facility: CLINIC | Age: 70
End: 2020-09-10
Payer: MEDICARE

## 2020-09-10 DIAGNOSIS — M85.9 LOW BONE DENSITY: ICD-10-CM

## 2020-09-10 DIAGNOSIS — R73.01 ABNORMAL FASTING GLUCOSE: ICD-10-CM

## 2020-09-10 DIAGNOSIS — Z92.3 PERSONAL HISTORY OF IRRADIATION, PRESENTING HAZARDS TO HEALTH: ICD-10-CM

## 2020-09-10 DIAGNOSIS — E04.1 THYROID NODULE: ICD-10-CM

## 2020-10-26 ENCOUNTER — VIRTUAL VISIT (OUTPATIENT)
Dept: ENDOCRINOLOGY | Facility: CLINIC | Age: 70
End: 2020-10-26
Payer: MEDICARE

## 2020-10-26 DIAGNOSIS — R59.0 CERVICAL ADENOPATHY: ICD-10-CM

## 2020-10-26 DIAGNOSIS — E04.1 THYROID NODULE: Primary | ICD-10-CM

## 2020-10-26 PROCEDURE — 99214 OFFICE O/P EST MOD 30 MIN: CPT | Mod: 95

## 2020-10-26 NOTE — PROGRESS NOTES
Endocrine video visit progress note    Attending Assessment/Plan :     Multiple thyroid nodules, all subcm with perithyroidal adenopathy  Today I think left # 1 is most suspicious  Right ishtmus # 2 is 2nd most suspicious to my eye.      Usually we wouldn't FNAB nodules < 1 cm.   She has history of radiation exposure as a child which increases her risk  Discussed in detail.  This is the primary reason I am recommending FNAB of the cervical LNs    perithyroidal adenopathy- Recommend FNAB of right level 4 # 1 and left level 3 # 1.    Addendum: review of FNAB images from 11/23/2020:     Low bone density on 12/19 DXA. Family history of hip fracture in her mother.  She meets treatment indications by FRAX 5.9%  hip. (18% major osteoporos)  She is now On alendronate. And tolerating-- agree with this treatment.   Next DXA should be 12/2021    Post menopausal    Abnormal fasting glucose/ prediabetes by A1c     History of radiation exposure presenting health risks - Shoe store fluroscope exposure as a child approx age 4    Due to the COVID 19 pandemic this visit was a video visit in order to help prevent spread of infection in this high risk patient and the general population. The patient gave verbal consent for the visit today.    Start time 1342  Stop time 1352  Total time 10 minutes     Olga Murry MD    Chief complaint: HISTORY OF PRESENT ILLNESS  Ashtyn presents today forl follow up of multiple thyroid nodules. Thyroid nodule was lst seen on an MRI  Since then, she has had thyorid US x 2, on 12/2019 and 9/10/2020.Ashtyn recalls exposure to the shoe store fluoroscope at [facility], when she was approximately 4 years of age    Today she confirms she is on alendraonte once/week, started since her last visit here.  She is tolerating it.    We have the following labs:   10/6/16 HgbA1c 5.6%  12/11/19 TSH 2.58, creatinine 0.7, Ca 9.4, glucose 106, cholesterol 262,   2/26/2020 calcitonin < 2, TPO < 10, HgbA1c 5.7%    I have  reviewed images on pACS  12/19/19 DXA : lowest T-score -2.4 L1-L2 spine.  There is a history of wrist fracture age 4.  There is a family history of hip fracture.  FRAX: 18/5.9% not including chlidhood fracture.  If I include that the major osteoporotic fracture risk is > 20%  9/10/2020 compared with 12/19  Right superior posterior 1 x 0.6 x 1.1 cm anechoic (was 0.6 x 0.9 x 1.1 cm)  Right anterior # 2 0.6 x 0.4 x 0.8 cm (was 0.7 x 0.5 x 0.8 cm )- ? microcalc  Right inferior # 3  0.6 x 0.4 x 0.5 cm   Left anterior inferior #1  0.7 x 0.7 x 0.7 hypoechoic  (was 0.7 x 0.6 x 0.7 cm )  Left # 2 0.5 x 0.3 x 0.7 cm hypoechoic (was 0.5 x 0.3 x 0.6 cm)  Right level 3 # 1 0.7 x 0.3 x 0.9 cm   Right level 4 # 1 0.9 x 0.5 x 0.7 cm   Right level 4 # 2 0.8 x 0.4 x 1.1 cm   Right level 5 # 1 0.7 x 0.4 x 0.5 cm Echogenic hilum  Right level 7 # 1 1.3 x 0.7 x 1.1 cm  Left level 3  0.9 x 0.5 x 2 cm (was 0.8 x 0.4 x 1.7 cm)  Left level 7 # 1 1.1 x 0.6 x 0.9 cm    REVIEW OF SYSTEMS  Hasn't had covid  Weight gain - up 10-15 lbs since the last 3 years ; current weight 170  No change in voice  Hard time getting chicken down   Feels OK overall  Sleep is pretty good  Cardiac: negative  Respiratory: Negative    Past Medical History  Past Medical History:   Diagnosis Date     Acrochordon      Atypical chest pain 1999    Normal stress test in 1999, performed for atypical chest pain     Benign nevus of skin     Numerous benign skin nevi     Biceps tendonitis      Bilateral bunions      Breast implant leak     Breast implants complicated by a leak. She then underwent replacement and repair.      Chronic fatigue      H/O urinary incontinence     Mild occasional female urinary incontinence which seems to be both urge and stress related.     History of bilateral cataract extraction      Hx of tubal ligation      Left hip pain     Occasional left hip pain due to osteoarthritis and/or trochanteric bursitis.     Lipoma of back 2014    Chronic lipoma of  the left back, status post-surgical resection in January 2014     Low back pain     Occasional low back pain with radicular symptoms into the legs.     Osteoarthritis of knees, bilateral      Osteoarthritis of left hip      Osteopenia 09/13/2013     Postmenopausal status      Pubic bone pain     Musculoskeletal pubic bone pain due to a strain.     Rosacea      Seborrheic keratoses      Skin lesions     Atypical melanocytic lesions, status post multiple biopsies by Dr. Rivera in July of 2015. These biposies showed a junctional nevus with moderate atypia, which is also a dysplastic nevus and the final one was also a compound nevus, with mild atypia which is also a dysplastic nevus.  She was contacted by Dr. Rivera and was told to follow up with her again in July 2016, for re-evaluation of her skin.     Skin tag      Solar elastosis      Solar lentiginosis      Sternal fracture 2015    Mid-sternal fracture after blunt trauma while falling while running up stepsin May 2015. She may have also had some rib fractures as well. These healed with conservative management.     Symptomatic varicose veins of both lower extremities 2015    Lower extremity venous varicosities, intermittently symptomatic, status post evaluation with Dr. Alonso in General Surgery in the summer of 2015.     Symptomatic varicose veins of both lower extremities     Status post injection and compression sclerotherapy     Syncope 2018     Urticaria     Questionable exercise-induced urticaria     Rosacea  Syncope 2018    Past Surgical History:   Procedure Laterality Date     APPENDECTOMY       BUNIONECTOMY Bilateral 10/2017     CATARACT IOL, RT/LT Right      COLONOSCOPY  2014    repeat in 10 yr     DENTAL SURGERY       MAMMOPLASTY AUGMENTATION Bilateral 1972     RHYTIDECTOMY (FACELIFT)  01/23/2014    neck     SCLEROTHERAPY  10/03/2014    varicose veins     SURGICAL PATHOLOGY EXAM  01/13/2014    lower back     TONSILLECTOMY       TUBAL LIGATION          Medications    Current Outpatient Medications   Medication Sig Dispense Refill     alendronate (FOSAMAX) 70 MG tablet Take 1 tablet (70 mg) by mouth every 7 days 12 tablet 3     atorvastatin (LIPITOR) 10 MG tablet Take 1 tablet (10 mg) by mouth daily 90 tablet 3     calcium carbonate 600 mg-vitamin D 400 units (CALCIUM 600 + D) 600-400 MG-UNIT per tablet Take 1 tablet by mouth 2 times daily 180 tablet 3     celecoxib (CELEBREX) 200 MG capsule Take 1 capsule (200 mg) by mouth daily 90 capsule 3     cetirizine (ZYRTEC) 10 MG tablet Take 1 tablet (10 mg) by mouth daily 90 tablet 3     metroNIDAZOLE (METROGEL) 1 % gel        Omega-3 Fatty Acids (FISH OIL PO)        terbinafine (LAMISIL) 250 MG tablet Take 1 tablet (250 mg) by mouth daily 90 tablet 0     tretinoin (RETIN-A) 0.1 % cream Apply QHS to face for rosacea       Fish oil  Mg ? sometimes    Allergies  Allergies   Allergen Reactions     Cheese      Fentanyl Other (See Comments)     May have caused pt. To pass out after cataract      Latex Dermatitis     Methohexital Other (See Comments)     May have caused pt. To pass out after cataract      Midazolam Other (See Comments)     May have caused pt. To pass out after cataract surgery     Seasonal Allergies      Family History  family history includes Bladder Cancer (age of onset: 67) in her father; Breast Cancer (age of onset: 53) in her sister; Cerebrovascular Disease in her maternal aunt; Cerebrovascular Disease (age of onset: 95) in her mother; Heart Disease (age of onset: 50) in her brother; Hip fracture in her mother; Hyperlipidemia in her maternal aunt and maternal aunt; Osteoarthritis in her mother; Osteoporosis in her mother.    Social History  Social History     Tobacco Use     Smoking status: Never Smoker     Smokeless tobacco: Never Used   Substance Use Topics     Alcohol use: Yes     Comment: 0-1 per day     Drug use: No     Lives [place] [facility]; exercise is walking ; kick boxing; weights  ".    Physical Exam  There were no vitals taken for this visit.  There is no height or weight on file to calculate BMI.   BP Readings from Last 1 Encounters:   07/30/20 135/85      Pulse Readings from Last 1 Encounters:   07/30/20 103      Resp Readings from Last 1 Encounters:   12/11/19 18      Temp Readings from Last 1 Encounters:   07/30/20 97.9  F (36.6  C) (Temporal)      SpO2 Readings from Last 1 Encounters:   07/30/20 96%      Wt Readings from Last 1 Encounters:   07/30/20 78.1 kg (172 lb 4 oz)      Ht Readings from Last 1 Encounters:   07/30/20 1.689 m (5' 6.5\")     GENERAL: no distress; her  is partially on camera, sitting nearby  SKIN: Visible skin clear.   EYES: Eyes grossly normal to inspection.    RESP: No audible wheeze, cough, or visible cyanosis.  No visible retractions or increased work of breathing.    NEURO:  Awake, alert, responds appropriately to questions.  Mentation and speech fluent.  PSYCH:affect normal, judgement and insight intact, and appearance well-groomed.    DATA REVIEW    Results for MARY TAVARES (MRN [id number]) as of 10/26/2020 16:09   Ref. Range 2/10/2020 14:31 2/26/2020 17:37   ALT Latest Ref Range: 0.0 - 50.0 U/L 39.0    AST Latest Ref Range: 0.0 - 45.0 U/L 30.0    Hemoglobin A1C Latest Ref Range: 0 - 5.6 %  5.7 (H)   Calcitonin Latest Ref Range: 0.0 - 5.1 pg/mL  <2.0   Cholesterol Latest Ref Range: 0.0 - 200.0  174.0    Cholesterol/HDL Ratio Latest Ref Range: 0.0 - 5.0  2.6    Fasting Specimen Unknown no    HDL Cholesterol Latest Ref Range: >50.0  68.0    LDL Cholesterol Direct Latest Ref Range: 0.0 - 129.0  95.0    Triglycerides Latest Ref Range: 0.0 - 150.0  57.0    VLDL-Cholesterol Latest Ref Range: 7.0 - 32.0  11.0    Glucose Latest Ref Range: 60.0 - 99.0 mg/dL 98.0    Thyroid Peroxidase Antibody Latest Ref Range: <35 IU/mL  <10     US THYROID 9/10/2020 1:00 PMCOMPARISON:  12/19/2019HISTORY: Thyroid nodule; Abnormal fasting glucose; Low bone density;Personal " history of irradiation, presenting hazards to health   FINDINGS:   Thyroid parenchyma: Mildly heterogeneous  The right lobe of the thyroid measures: 1.9 x 1.3 x 3.5 cm  The left lobe of the thyroid measures: 1.5 x 1.4 x 3.5 cm  The thyroid isthmus measures: 0.3 cm     Right Lobe:  Nodule 1:  Location: Upper  Size: 1.0 x 0.6 x 1.1 cm  Composition: Cystic or almost completely cystic (0 points)  Echogenicity: Anechoic (0 points)  Shape: Wider than tall (0 points)  Margin: Smooth (0 points)  Echogenic Foci: None or large comet tail artifact (0 points)  Stability: No significant change in size  TIRADS: TR1 (0 points) Benign     Nodule 2:  Location: Medial  Size: 0.6 x 0.4 x 0.8 cm  Composition: Solid or almost completely solid (2 points)  Echogenicity: Hyperechoic or isoechoic (1 point)  Shape: Wider than tall (0 points)  Margin: Ill-defined (0 points)  Echogenic Foci: None or large comet tail artifact (0 points)  Stability: No significant change in size  TIRADS: TR3 (3 points)      Nodule 3:  Location: Inferior  Size: 0.6 x 0.4 x 0.5 cm  Composition: Solid or almost completely solid (2 points)  Echogenicity: Hypoechoic (2 points)  Shape: Wider than tall (0 points)  Margin: Ill-defined (0 points)  Echogenic Foci: None or large comet tail artifact (0 points)  Stability: New  TIRADS: TR4 (4-6 points)      Left Lobe:     Nodule 1:  Location: Medial  Size: 0.7 x 0.7 x 0.7 cm  Composition: Spongiform (0 points)-- I disagree with this - I wouldn't call this spongiform   Echogenicity: Anechoic (0 points)-- I disagree with this - I would call this very hypoechoic   Shape: Wider than tall (0 points)  Margin: Smooth (0 points)  Echogenic Foci: None or large comet tail artifact (0 points)  Stability: No significant change in size  TIRADS: TR1 (0 points) Benign        Nodule 2:  Location: Medial  Size: 0.5 x 0.3 x 0.7 cm  Composition: Solid or almost completely solid (2 points)  Echogenicity: Hyperechoic or isoechoic (1  point)  Shape: Wider than tall (0 points)  Margin: Ill-defined (0 points)  Echogenic Foci: None or large comet tail artifact (0 points)  Stability: No significant change in size  TIRADS: TR3 (3 points)                                                                       Impression:  Multiple subcentimeter thyroid nodules are stable compared to prior study. None of them meet criteria for FNA according to TIRADS criteria.     ACR TI-RADS recommendations  TR2 (2 points) & TR1 (0 points) -No FNA or follow-up  TR3 (3 points) - FNA if ? 2.5cm, follow-up if 1.5 -2.4 cm in 1, 3 and  5 years  TR4 (4-6 points) - FNA if ? 1.5cm, follow-up if 1 -1.4 cm in 1, 2, 3  and 5 years  TR5 (?7 points) - FNA if ? 1cm, follow-up if 0.5 -0.9 cm every year  for 5 years      I have personally reviewed the examination and initial interpretationand I agree with the findings.  CORBIN DEVINE MD    EXAMINATION: US HEAD NECK SOFT TISSUE, 9/10/2020 1:43 PM COMPARISON: None.HISTORY: Thyroid nodule. Personal history of prior irradiation.   TECHNIQUE: Sonographic imaging performed of the neck to evaluate forlymph nodes using grey scale and limited Doppler technique.     FINDINGS:Lymph nodes are measured bilaterally with measurements given in transverse, AP, and craniocaudal dimensions as follows:     Right:  Level 1: None  Level 2: 0.9 x 0.6 x 1.3 cm lymph node with a preserved fatty hilum  Level 3: 0.7 x 0.3 x 0.9 cm lymph node with mild preserved fatty hilum  Level 4: 2 lymph nodes measuring 0.9 x 0.5 x 0.7 cm with replaced fatty hilum and 0.8 x 0.4 x 1.1 cm with preserved fatty hilum.  Level 5: 0.6 x 0.4 x 0.5 cm lymph node with a preserved fatty hilum.  Level 6: Thyroid gland right lobe  Level 7: 1.3 x 0.7 x 1.0 cm lymph node with a preserved fatty hilum.     Left:  Level 1: None  Level 2: 2 lymph nodes measuring 1.3 x 0.7 x 1.5 cm with a preserved fatty hilum and 0.8 x 0.5 x 1.3 cm lymph node with a replaced fatty hilum.  Level 3: 0.9 x 0.5 x  2.0 cm lymph node with minimal preserved fatty  hilum.  Level 4: None  Level 5: None  Level 6: Thyroid gland left lobe.  Level 7: 1.1 x 0.6 x 0.9 cm lymph node with a minimal preserved fatty hilum.                                                                 IMPRESSION:  1.  Soft tissue neck ultrasound with lymph node measurements asdescribed above   TORIN ARTHUR MD      EXAMINATION: US BIOPSY FINE NEEDLE ASPIRATION LYMPH NODE, 11/23/2020  12:02 PM     COMPARISON: US head and neck 9/10/2020.     HISTORY: abnormal lymph node; history of head and neck irradiation and  thyroid nodules; Thyroid nodule; Cervical adenopathy     FINDINGS: After describing the risks, benefits, and alternatives to  ultrasound guided bilateral neck lymph node fine needle aspiration,  the patient elected to proceed and written and an informed consent was  obtained.     The patient was then placed supine and preliminary grayscale images  demonstrated right level 4 (#1) and left level 3 (#1) lymph nodes. The  patient was then prepped and draped in a sterile fashion. Local  anesthesia was achieved using 3 cc of 1% lidocaine in total. Under  direct sonographic guidance, 4 passes of the each lymph nodes  performed using 25-gauge needles. Preliminary interpretation from  pathology suggests adequate cellularity for diagnosis. There were no  immediate complications.                                                                      IMPRESSION: Technically successful ultrasound-guided bilateral neck  lymph node fine needle aspiration.     I, LENIN OHARA MD, attest that I was present for all critical  portions of the procedure and was immediately available to provide  guidance and assistance during the remainder of the procedure.     I have personally reviewed the examination and initial interpretation  and I agree with the findings.     LENIN OHARA MD    Patient Name: VIRGEN TAVARES   MR#: [id number]   Specimen #: [id number]    Collected: 11/23/2020 11:00   Received: 11/23/2020 13:45   Reported: 11/24/2020 13:28   Ordering Phy(s): FREDDY CHAWLA     For improved result formatting, select 'View Enhanced Report Format' under    Linked Documents section.   _________________________________________     SPECIMEN(S):   Left level 3 #1 lymph node     INTERPRETATION:   Left level 3 #1 lymph node:          Polytypic B-cells        No aberrant immunophenotype on T-cells     COMMENT:   There is no immunophenotypic evidence of B or T cell lymphoma. Hodgkin   lymphoma cannot be excluded by flow   cytometry. This sample may not be representative. Please correlate with   separately reported morphologic   interpretation ([id number]) as well as separately reported [id number].   Clinical correlation is recommended.     RESULTS:   89% of total events are CD45 positive and are viable by 7-AAD. A low   percentage may be caused by low viability   and/or a low number of CD45 positive cells. Of the CD45 positive   leukocytes, 90% are viable by 7-AAD.     Percentages reported below are based on the total number of CD45 positive   viable leukocytes. If applicable,   percentage of plasma cells is from total viable nucleated cells.     19% polytypic B cells     73% T cells with a CD4:CD8 ratio of 10:1.   1.4% NK cells     ANTIBODIES:   Multi-color flow analysis is performed for the following markers: CD2,   CD3, CD4, CD5, CD7, CD8, CD10, CD16,   CD19, CD20, CD34, CD38, CD45, CD56, CD57, and kappa and lambda   immunoglobulin light chains. Cells are gated to   isolate populations (CD45 versus side scatter and forward scatter versus   side scatter), to exclude debris   (forward scatter versus side scatter) and to exclude cell doublets   (forward scatter height versus forward   scatter width and side scatter height versus side scatter width). Forward   scatter varies with cell size. Side   scatter varies with the amount of cytoplasmic granules. Intensity for CD45     usually increases as hematolymphoid   cells mature.     CLINICAL HISTORY:   70 year old female with lymphadenopathy.     I have personally reviewed all specimens and/or slides, including the   listed special stains, and used them   with my medical judgment to determine the final diagnosis.     Electronically signed out by:     Jairon vIey MD     This test was developed and its performance characteristics determined by   [facility]. It has not been cleared or   approved by the US Food and Drug   Administration.  FDA does not require this test to go through premarket   FDA review. This test is used for   clinical purposes and should not be regarded as investigational or for   research.  This laboratory is certified   under the Clinical Laboratory Improvement Amendments (CLIA) as qualified   to perform high complexity clinical   laboratory testing.     CPT Codes:   A: 36711-WF, 71527-WQIXENN, 65776-94-GQVD70(13), 78411-32-IUJL44(2)     TESTING LAB LOCATION:   [address] [address] [phone]     COLLECTION SITE:   Client:  [facility]      Patient Name: VIRGEN TAVARES   MR#: [id number]   Specimen #: [id number]   Collected: 11/23/2020   Received: 11/23/2020   Reported: 11/24/2020 14:30   Ordering Phy(s): FREDDY CHAWLA     For improved result formatting, select 'View Enhanced Report Format' under    Linked Documents section.     SPECIMEN/STAIN PROCESS:   A: Lymph node, left level 3 #1, ultrasound guided fine needle aspiration        Pap-Cyto x 3, Diff Quick Stain-cyto x 3   B: Lymph node, right level 4 #1 , ultrasound guided fine needle aspiration        Pap-Cyto x 3, Diff Quick Stain-cyto x 3     ----------------------------------------------------------------     CYTOLOGIC INTERPRETATION:     A.  Lymph node, left level 3 #1, ultrasound guided  fine needle aspiration:   - Negative for malignancy   - Polymorphous population of lymphocytes   Specimen Adequacy: Satisfactory for evaluation.     B.  Lymph node, right level 4 #1 , ultrasound guided fine needle   aspiration:   - Negative for malignancy   - Polymorphous population of lymphocytes   Specimen Adequacy: Satisfactory for evaluation.     COMMENT:   Please see flow cytometry reports, [id number] and [id number].     I have personally reviewed all specimens and/or slides, including the   listed special stains, and used them   with my medical judgement to determine or confirm the final diagnosis.     Electronically signed out by:   Jame Aldridge M.D., [facility]     CLINICAL HISTORY:   The patient is a 70-year-old woman with multiple thyroid nodules and   history of neck irradiation.     ,     GROSS:   A. Lymph node, left level 3 #1, ultrasound guided fine needle aspiration:    Received are 3 fixed slides,   processed for Pap stain, and 3 air dried slides, processed for Diff Quik   stain.  RPMI sent directly to flow.     B. Lymph node, right level 4 #1 , ultrasound guided fine needle   aspiration:  Received are 3 fixed slides,   processed for Pap stain, and 3 air dried slides, processed for Diff Quik   stain.  RPMI sent directly to flow.     INTRAOPERATIVE CONSULTATION:   FNA Performance: Fine needle aspiration was not performed by [facility],    Pathology staff.   Aspirate immediate study/adequacy:   I, Dr. LEROY Aldridge III, MD attest that I immediately examined smears while   the procedure was underway and   determined or confirmed the adequacy of the specimens via telepathology.   It is of note that the final assessment and report may be performed and   signed by a different pathologist.     Onsite adequacy/interpretation:   Pass A1 adequate, Pass A2 put into RPMI, Pass A3-A4  adequate.   Pass B1 adequate, Pass B2 put into RPMI, Pass B3-B4 adequate.     MICROSCOPIC:   Microscopic examination was performed.      Monica Suarez MD (PGY3)   Jame Aldridge III, MD (Staff Pathologist)     CPT Codes:   A: 31528-BRVW-NNX, 19115-MINL-XCU, 68000-ZXMB   B: 91769-ZFRD-BUK, 73837-XWXI-WLC, 84997-OHGC     COLLECTION SITE:   Client:  [facility]   Location:  [facility] (B)     The technical component of this testing was completed at the [facility], with the professional component performed    at the [facility], [address] [place] [phone] ([phone])     Resident

## 2020-10-26 NOTE — PROGRESS NOTES
"Yanely Jon is a 70 year old female who is being evaluated via a billable video visit.      The patient has been notified of following:     \"This video visit will be conducted via a call between you and your physician/provider. We have found that certain health care needs can be provided without the need for an in-person physical exam.  This service lets us provide the care you need with a video conversation.  If a prescription is necessary we can send it directly to your pharmacy.  If lab work is needed we can place an order for that and you can then stop by our lab to have the test done at a later time.    Video visits are billed at different rates depending on your insurance coverage.  Please reach out to your insurance provider with any questions.    If during the course of the call the physician/provider feels a video visit is not appropriate, you will not be charged for this service.\"    Patient has given verbal consent for Video visit? Yes  How would you like to obtain your AVS? MyChart  Will anyone else be joining your video visit?Yes, .    Video-Visit Details    Type of service:  Video Visit    Distant Location (provider location):  [facility] SALLIE Joseph MA          "

## 2020-10-26 NOTE — LETTER
"10/26/2020       RE: Yanely Jon  [address]  [facility] [id number]     Dear Colleague,    Thank you for referring your patient, Yanely Jon, to the [facility] [place] at [facility]. Please see a copy of my visit note below.    Yanely Jon is a 70 year old female who is being evaluated via a billable video visit.      The patient has been notified of following:     \"This video visit will be conducted via a call between you and your physician/provider. We have found that certain health care needs can be provided without the need for an in-person physical exam.  This service lets us provide the care you need with a video conversation.  If a prescription is necessary we can send it directly to your pharmacy.  If lab work is needed we can place an order for that and you can then stop by our lab to have the test done at a later time.    Video visits are billed at different rates depending on your insurance coverage.  Please reach out to your insurance provider with any questions.    If during the course of the call the physician/provider feels a video visit is not appropriate, you will not be charged for this service.\"    Patient has given verbal consent for Video visit? Yes  How would you like to obtain your AVS? MyChart  Will anyone else be joining your video visit?Yes, .    Video-Visit Details    Type of service:  Video Visit    Distant Location (provider location):  [facility] [place]     Janae Joseph MA            Endocrine video visit progress note    Attending Assessment/Plan :     Multiple thyroid nodules, all subcm with perithyroidal adenopathy  Today I think left # 1 is most suspicious  Right ishtmus # 2 is 2nd most suspicious to my eye.      Usually we wouldn't FNAB nodules < 1 cm.   She has history of radiation exposure as a child which increases her " risk  Discussed in detail.  This is the primary reason I am recommending FNAB of the cervical LNs    perithyroidal adenopathy- Recommend FNAB of right level 4 # 1 and left level 3 # 1.      Low bone density on 12/19 DXA. Family history of hip fracture in her mother.  She meets treatment indications by FRAX 5.9%  hip. (18% major osteoporos)  She is now On alendronate. And tolerating-- agree with this treatment.   Next DXA should be 12/2021    Post menopausal    Abnormal fasting glucose/ prediabetes by A1c     History of radiation exposure presenting health risks - Shoe store fluroscope exposure as a child approx age 4    Due to the COVID 19 pandemic this visit was a video visit in order to help prevent spread of infection in this high risk patient and the general population. The patient gave verbal consent for the visit today.    Start time 1342  Stop time 1352  Total time 10 minutes     Olga Murry MD    Chief complaint: HISTORY OF PRESENT ILLNESS  Ashtyn presents today forl follow up of multiple thyroid nodules. Thyroid nodule was lst seen on an MRI  Since then, she has had thyorid US x 2, on 12/2019 and 9/10/2020.Ashtyn recalls exposure to the shoe store fluoroscope at [facility], when she was approximately 4 years of age    Today she confirms she is on alendraonte once/week, started since her last visit here.  She is tolerating it.    We have the following labs:   10/6/16 HgbA1c 5.6%  12/11/19 TSH 2.58, creatinine 0.7, Ca 9.4, glucose 106, cholesterol 262,   2/26/2020 calcitonin < 2, TPO < 10, HgbA1c 5.7%    I have reviewed images on pACS  12/19/19 DXA : lowest T-score -2.4 L1-L2 spine.  There is a history of wrist fracture age 4.  There is a family history of hip fracture.  FRAX: 18/5.9% not including chlidhood fracture.  If I include that the major osteoporotic fracture risk is > 20%  9/10/2020 compared with 12/19  Right superior posterior 1 x 0.6 x 1.1 cm anechoic (was 0.6 x 0.9 x 1.1 cm)  Right anterior #  2 0.6 x 0.4 x 0.8 cm (was 0.7 x 0.5 x 0.8 cm )- ? microcalc  Right inferior # 3  0.6 x 0.4 x 0.5 cm   Left anterior inferior #1  0.7 x 0.7 x 0.7 hypoechoic  (was 0.7 x 0.6 x 0.7 cm )  Left # 2 0.5 x 0.3 x 0.7 cm hypoechoic (was 0.5 x 0.3 x 0.6 cm)  Right level 3 # 1 0.7 x 0.3 x 0.9 cm   Right level 4 # 1 0.9 x 0.5 x 0.7 cm   Right level 4 # 2 0.8 x 0.4 x 1.1 cm   Right level 5 # 1 0.7 x 0.4 x 0.5 cm Echogenic hilum  Right level 7 # 1 1.3 x 0.7 x 1.1 cm  Left level 3  0.9 x 0.5 x 2 cm (was 0.8 x 0.4 x 1.7 cm)  Left level 7 # 1 1.1 x 0.6 x 0.9 cm    REVIEW OF SYSTEMS  Hasn't had covid  Weight gain - up 10-15 lbs since the last 3 years ; current weight 170  No change in voice  Hard time getting chicken down   Feels OK overall  Sleep is pretty good  Cardiac: negative  Respiratory: Negative    Past Medical History  Past Medical History:   Diagnosis Date     Acrochordon      Atypical chest pain 1999    Normal stress test in 1999, performed for atypical chest pain     Benign nevus of skin     Numerous benign skin nevi     Biceps tendonitis      Bilateral bunions      Breast implant leak     Breast implants complicated by a leak. She then underwent replacement and repair.      Chronic fatigue      H/O urinary incontinence     Mild occasional female urinary incontinence which seems to be both urge and stress related.     History of bilateral cataract extraction      Hx of tubal ligation      Left hip pain     Occasional left hip pain due to osteoarthritis and/or trochanteric bursitis.     Lipoma of back 2014    Chronic lipoma of the left back, status post-surgical resection in January 2014     Low back pain     Occasional low back pain with radicular symptoms into the legs.     Osteoarthritis of knees, bilateral      Osteoarthritis of left hip      Osteopenia 09/13/2013     Postmenopausal status      Pubic bone pain     Musculoskeletal pubic bone pain due to a strain.     Rosacea      Seborrheic keratoses      Skin lesions      Atypical melanocytic lesions, status post multiple biopsies by Dr. Rivera in July of 2015. These biposies showed a junctional nevus with moderate atypia, which is also a dysplastic nevus and the final one was also a compound nevus, with mild atypia which is also a dysplastic nevus.  She was contacted by Dr. Rivera and was told to follow up with her again in July 2016, for re-evaluation of her skin.     Skin tag      Solar elastosis      Solar lentiginosis      Sternal fracture 2015    Mid-sternal fracture after blunt trauma while falling while running up stepsin May 2015. She may have also had some rib fractures as well. These healed with conservative management.     Symptomatic varicose veins of both lower extremities 2015    Lower extremity venous varicosities, intermittently symptomatic, status post evaluation with Dr. Alonso in General Surgery in the summer of 2015.     Symptomatic varicose veins of both lower extremities     Status post injection and compression sclerotherapy     Syncope 2018     Urticaria     Questionable exercise-induced urticaria     Rosacea  Syncope 2018    Past Surgical History:   Procedure Laterality Date     APPENDECTOMY       BUNIONECTOMY Bilateral 10/2017     CATARACT IOL, RT/LT Right      COLONOSCOPY  2014    repeat in 10 yr     DENTAL SURGERY       MAMMOPLASTY AUGMENTATION Bilateral 1972     RHYTIDECTOMY (FACELIFT)  01/23/2014    neck     SCLEROTHERAPY  10/03/2014    varicose veins     SURGICAL PATHOLOGY EXAM  01/13/2014    lower back     TONSILLECTOMY       TUBAL LIGATION         Medications    Current Outpatient Medications   Medication Sig Dispense Refill     alendronate (FOSAMAX) 70 MG tablet Take 1 tablet (70 mg) by mouth every 7 days 12 tablet 3     atorvastatin (LIPITOR) 10 MG tablet Take 1 tablet (10 mg) by mouth daily 90 tablet 3     calcium carbonate 600 mg-vitamin D 400 units (CALCIUM 600 + D) 600-400 MG-UNIT per tablet Take 1 tablet by mouth 2 times daily 180 tablet 3      celecoxib (CELEBREX) 200 MG capsule Take 1 capsule (200 mg) by mouth daily 90 capsule 3     cetirizine (ZYRTEC) 10 MG tablet Take 1 tablet (10 mg) by mouth daily 90 tablet 3     metroNIDAZOLE (METROGEL) 1 % gel        Omega-3 Fatty Acids (FISH OIL PO)        terbinafine (LAMISIL) 250 MG tablet Take 1 tablet (250 mg) by mouth daily 90 tablet 0     tretinoin (RETIN-A) 0.1 % cream Apply QHS to face for rosacea       Fish oil  Mg ? sometimes    Allergies  Allergies   Allergen Reactions     Cheese      Fentanyl Other (See Comments)     May have caused pt. To pass out after cataract      Latex Dermatitis     Methohexital Other (See Comments)     May have caused pt. To pass out after cataract      Midazolam Other (See Comments)     May have caused pt. To pass out after cataract surgery     Seasonal Allergies      Family History  family history includes Bladder Cancer (age of onset: 67) in her father; Breast Cancer (age of onset: 53) in her sister; Cerebrovascular Disease in her maternal aunt; Cerebrovascular Disease (age of onset: 95) in her mother; Heart Disease (age of onset: 50) in her brother; Hip fracture in her mother; Hyperlipidemia in her maternal aunt and maternal aunt; Osteoarthritis in her mother; Osteoporosis in her mother.    Social History  Social History     Tobacco Use     Smoking status: Never Smoker     Smokeless tobacco: Never Used   Substance Use Topics     Alcohol use: Yes     Comment: 0-1 per day     Drug use: No     [place] [facility]; exercise is walking ; kick boxing; weights .    Physical Exam  There were no vitals taken for this visit.  There is no height or weight on file to calculate BMI.   BP Readings from Last 1 Encounters:   07/30/20 135/85      Pulse Readings from Last 1 Encounters:   07/30/20 103      Resp Readings from Last 1 Encounters:   12/11/19 18      Temp Readings from Last 1 Encounters:   07/30/20 97.9  F (36.6  C) (Temporal)      SpO2 Readings from Last 1 Encounters:  "  07/30/20 96%      Wt Readings from Last 1 Encounters:   07/30/20 78.1 kg (172 lb 4 oz)      Ht Readings from Last 1 Encounters:   07/30/20 1.689 m (5' 6.5\")     GENERAL: no distress; her  is partially on camera, sitting nearby  SKIN: Visible skin clear.   EYES: Eyes grossly normal to inspection.    RESP: No audible wheeze, cough, or visible cyanosis.  No visible retractions or increased work of breathing.    NEURO:  Awake, alert, responds appropriately to questions.  Mentation and speech fluent.  PSYCH:affect normal, judgement and insight intact, and appearance well-groomed.    DATA REVIEW    Results for MARY TAVARES (MRN [id number]) as of 10/26/2020 16:09   Ref. Range 2/10/2020 14:31 2/26/2020 17:37   ALT Latest Ref Range: 0.0 - 50.0 U/L 39.0    AST Latest Ref Range: 0.0 - 45.0 U/L 30.0    Hemoglobin A1C Latest Ref Range: 0 - 5.6 %  5.7 (H)   Calcitonin Latest Ref Range: 0.0 - 5.1 pg/mL  <2.0   Cholesterol Latest Ref Range: 0.0 - 200.0  174.0    Cholesterol/HDL Ratio Latest Ref Range: 0.0 - 5.0  2.6    Fasting Specimen Unknown no    HDL Cholesterol Latest Ref Range: >50.0  68.0    LDL Cholesterol Direct Latest Ref Range: 0.0 - 129.0  95.0    Triglycerides Latest Ref Range: 0.0 - 150.0  57.0    VLDL-Cholesterol Latest Ref Range: 7.0 - 32.0  11.0    Glucose Latest Ref Range: 60.0 - 99.0 mg/dL 98.0    Thyroid Peroxidase Antibody Latest Ref Range: <35 IU/mL  <10     US THYROID 9/10/2020 1:00 PMCOMPARISON: US 12/19/2019HISTORY: Thyroid nodule; Abnormal fasting glucose; Low bone density;Personal history of irradiation, presenting hazards to health   FINDINGS:   Thyroid parenchyma: Mildly heterogeneous  The right lobe of the thyroid measures: 1.9 x 1.3 x 3.5 cm  The left lobe of the thyroid measures: 1.5 x 1.4 x 3.5 cm  The thyroid isthmus measures: 0.3 cm     Right Lobe:  Nodule 1:  Location: Upper  Size: 1.0 x 0.6 x 1.1 cm  Composition: Cystic or almost completely cystic (0 points)  Echogenicity: " Anechoic (0 points)  Shape: Wider than tall (0 points)  Margin: Smooth (0 points)  Echogenic Foci: None or large comet tail artifact (0 points)  Stability: No significant change in size  TIRADS: TR1 (0 points) Benign     Nodule 2:  Location: Medial  Size: 0.6 x 0.4 x 0.8 cm  Composition: Solid or almost completely solid (2 points)  Echogenicity: Hyperechoic or isoechoic (1 point)  Shape: Wider than tall (0 points)  Margin: Ill-defined (0 points)  Echogenic Foci: None or large comet tail artifact (0 points)  Stability: No significant change in size  TIRADS: TR3 (3 points)      Nodule 3:  Location: Inferior  Size: 0.6 x 0.4 x 0.5 cm  Composition: Solid or almost completely solid (2 points)  Echogenicity: Hypoechoic (2 points)  Shape: Wider than tall (0 points)  Margin: Ill-defined (0 points)  Echogenic Foci: None or large comet tail artifact (0 points)  Stability: New  TIRADS: TR4 (4-6 points)      Left Lobe:     Nodule 1:  Location: Medial  Size: 0.7 x 0.7 x 0.7 cm  Composition: Spongiform (0 points)-- I disagree with this - I wouldn't call this spongiform   Echogenicity: Anechoic (0 points)-- I disagree with this - I would call this very hypoechoic   Shape: Wider than tall (0 points)  Margin: Smooth (0 points)  Echogenic Foci: None or large comet tail artifact (0 points)  Stability: No significant change in size  TIRADS: TR1 (0 points) Benign        Nodule 2:  Location: Medial  Size: 0.5 x 0.3 x 0.7 cm  Composition: Solid or almost completely solid (2 points)  Echogenicity: Hyperechoic or isoechoic (1 point)  Shape: Wider than tall (0 points)  Margin: Ill-defined (0 points)  Echogenic Foci: None or large comet tail artifact (0 points)  Stability: No significant change in size  TIRADS: TR3 (3 points)                                                                       Impression:  Multiple subcentimeter thyroid nodules are stable compared to prior study. None of them meet criteria for FNA according to TIRADS  criteria.     ACR TI-RADS recommendations  TR2 (2 points) & TR1 (0 points) -No FNA or follow-up  TR3 (3 points) - FNA if ? 2.5cm, follow-up if 1.5 -2.4 cm in 1, 3 and  5 years  TR4 (4-6 points) - FNA if ? 1.5cm, follow-up if 1 -1.4 cm in 1, 2, 3  and 5 years  TR5 (?7 points) - FNA if ? 1cm, follow-up if 0.5 -0.9 cm every year  for 5 years      I have personally reviewed the examination and initial interpretationand I agree with the findings.  CORBIN DEVINE MD    EXAMINATION: US HEAD NECK SOFT TISSUE, 9/10/2020 1:43 PM COMPARISON: None.HISTORY: Thyroid nodule. Personal history of prior irradiation.   TECHNIQUE: Sonographic imaging performed of the neck to evaluate forlymph nodes using grey scale and limited Doppler technique.     FINDINGS:Lymph nodes are measured bilaterally with measurements given in transverse, AP, and craniocaudal dimensions as follows:     Right:  Level 1: None  Level 2: 0.9 x 0.6 x 1.3 cm lymph node with a preserved fatty hilum  Level 3: 0.7 x 0.3 x 0.9 cm lymph node with mild preserved fatty hilum  Level 4: 2 lymph nodes measuring 0.9 x 0.5 x 0.7 cm with replaced fatty hilum and 0.8 x 0.4 x 1.1 cm with preserved fatty hilum.  Level 5: 0.6 x 0.4 x 0.5 cm lymph node with a preserved fatty hilum.  Level 6: Thyroid gland right lobe  Level 7: 1.3 x 0.7 x 1.0 cm lymph node with a preserved fatty hilum.     Left:  Level 1: None  Level 2: 2 lymph nodes measuring 1.3 x 0.7 x 1.5 cm with a preserved fatty hilum and 0.8 x 0.5 x 1.3 cm lymph node with a replaced fatty hilum.  Level 3: 0.9 x 0.5 x 2.0 cm lymph node with minimal preserved fatty  hilum.  Level 4: None  Level 5: None  Level 6: Thyroid gland left lobe.  Level 7: 1.1 x 0.6 x 0.9 cm lymph node with a minimal preserved fatty hilum.                                                                 IMPRESSION:  1.  Soft tissue neck ultrasound with lymph node measurements asdescribed above   TORIN ARTHUR MD

## 2020-11-03 NOTE — TELEPHONE ENCOUNTER
A user error has taken place: encounter opened in error, closed for administrative reasons.  Mary Jo Hummel RN  11/03/20  4:34 PM

## 2020-11-09 DIAGNOSIS — B35.1 ONYCHOMYCOSIS: ICD-10-CM

## 2020-11-09 NOTE — TELEPHONE ENCOUNTER
Patient started on this medication 7/30/20 for 90 day course.     Routing refill request to provider for review/approval because: Do you want pt to continue on this medication?     Mary Jo Hummel RN  11/09/20  11:37 AM

## 2020-11-10 RX ORDER — TERBINAFINE HYDROCHLORIDE 250 MG/1
250 TABLET ORAL DAILY
Qty: 90 TABLET | Refills: 0 | OUTPATIENT
Start: 2020-11-10

## 2020-11-11 NOTE — TELEPHONE ENCOUNTER
Mycharted pt to relay information from [facility] that needs an appt to discuss this for any further refills     Yoanna El RN  November 11, 2020 2:36 PM

## 2020-11-12 NOTE — TELEPHONE ENCOUNTER
Pharmacy notified that pt needs appointment to discuss refilling this medication, no refills at this time until pt has visit.     Mary Jo Hummel RN  11/12/20  12:28 PM

## 2020-11-18 DIAGNOSIS — Z11.59 ENCOUNTER FOR SCREENING FOR OTHER VIRAL DISEASES: Primary | ICD-10-CM

## 2020-11-19 DIAGNOSIS — Z11.59 ENCOUNTER FOR SCREENING FOR OTHER VIRAL DISEASES: ICD-10-CM

## 2020-11-19 PROCEDURE — U0003 INFECTIOUS AGENT DETECTION BY NUCLEIC ACID (DNA OR RNA); SEVERE ACUTE RESPIRATORY SYNDROME CORONAVIRUS 2 (SARS-COV-2) (CORONAVIRUS DISEASE [COVID-19]), AMPLIFIED PROBE TECHNIQUE, MAKING USE OF HIGH THROUGHPUT TECHNOLOGIES AS DESCRIBED BY CMS-2020-01-R: HCPCS | Performed by: PATHOLOGY

## 2020-11-20 LAB
SARS-COV-2 RNA SPEC QL NAA+PROBE: NOT DETECTED
SPECIMEN SOURCE: NORMAL

## 2020-11-23 ENCOUNTER — ANCILLARY PROCEDURE (OUTPATIENT)
Dept: ULTRASOUND IMAGING | Facility: CLINIC | Age: 70
End: 2020-11-23
Payer: MEDICARE

## 2020-11-23 DIAGNOSIS — E04.1 THYROID NODULE: ICD-10-CM

## 2020-11-23 DIAGNOSIS — R59.0 CERVICAL ADENOPATHY: ICD-10-CM

## 2020-11-23 PROCEDURE — 88173 CYTOPATH EVAL FNA REPORT: CPT | Mod: GC | Performed by: PATHOLOGY

## 2020-11-23 PROCEDURE — 10005 FNA BX W/US GDN 1ST LES: CPT | Mod: GC | Performed by: RADIOLOGY

## 2020-11-23 PROCEDURE — 88189 FLOWCYTOMETRY/READ 16 & >: CPT | Performed by: PATHOLOGY

## 2020-11-23 PROCEDURE — 88172 CYTP DX EVAL FNA 1ST EA SITE: CPT | Mod: GC | Performed by: PATHOLOGY

## 2020-11-23 RX ORDER — LIDOCAINE HYDROCHLORIDE 10 MG/ML
5 INJECTION, SOLUTION EPIDURAL; INFILTRATION; INTRACAUDAL; PERINEURAL ONCE
Status: COMPLETED | OUTPATIENT
Start: 2020-11-23 | End: 2020-11-23

## 2020-11-23 RX ADMIN — LIDOCAINE HYDROCHLORIDE 3 ML: 10 INJECTION, SOLUTION EPIDURAL; INFILTRATION; INTRACAUDAL; PERINEURAL at 11:02

## 2020-11-24 ENCOUNTER — TELEPHONE (OUTPATIENT)
Dept: ENDOCRINOLOGY | Facility: CLINIC | Age: 70
End: 2020-11-24

## 2020-11-24 DIAGNOSIS — E04.1 THYROID NODULE: ICD-10-CM

## 2020-11-24 DIAGNOSIS — Z92.3 PERSONAL HISTORY OF IRRADIATION, PRESENTING HAZARDS TO HEALTH: ICD-10-CM

## 2020-11-24 DIAGNOSIS — R59.0 CERVICAL ADENOPATHY: Primary | ICD-10-CM

## 2020-11-24 LAB
COPATH REPORT: NORMAL

## 2020-11-25 NOTE — TELEPHONE ENCOUNTER
Spoke w/ Pt: Confirms understanding of review and recommendations and will schedule US and follow up with Dr Murry.   Elaina Shirley RN on 11/25/2020 at 9:06 AM

## 2020-11-25 NOTE — TELEPHONE ENCOUNTER
Please call her and tell her the biopsies were read as benign.  She should have repeat ultrasound and see me afterwards in a year.    Olga Murry MD

## 2020-12-08 NOTE — PATIENT INSTRUCTIONS
Dietitian at [facility]  [place]   Call Anita for an appt.    Shawn Ullrich  Behavioral health clinician  [facility]    Preventive Health Recommendations    See your health care provider every year to    Review health changes.     Discuss preventive care.      Review your medicines if your doctor has prescribed any.      You no longer need a yearly Pap test unless you've had an abnormal Pap test in the past 10 years. If you have vaginal symptoms, such as bleeding or discharge, be sure to talk with your provider about a Pap test.      Every 1 to 2 years, have a mammogram.  If you are over 69, talk with your health care provider about whether or not you want to continue having screening mammograms.      Every 10 years, have a colonoscopy. Or, have a yearly FIT test (stool test). These exams will check for colon cancer.       Have a cholesterol test every 5 years, or more often if your doctor advises it.       Have a diabetes test (fasting glucose) every three years. If you are at risk for diabetes, you should have this test more often.       At age 65, have a bone density scan (DEXA) to check for osteoporosis (brittle bone disease).    Shots:    Get a flu shot each year.    Get a tetanus shot every 10 years.    Talk to your doctor about your pneumonia vaccines. There are now two you should receive - Pneumovax (PPSV 23) and Prevnar (PCV 13).    Talk to your pharmacist about the shingles vaccine.    Talk to your doctor about the hepatitis B vaccine.    Nutrition:     Eat at least 5 servings of fruits and vegetables each day.      Eat whole-grain bread, whole-wheat pasta and brown rice instead of white grains and rice.      Get adequate about Calcium and Vitamin D.     Lifestyle    Exercise at least 150 minutes a week (30 minutes a day, 5 days a week). This will help you control your weight and prevent disease.      Limit alcohol to one drink per day.      No smoking.       Wear sunscreen to prevent  skin cancer.       See your dentist twice a year for an exam and cleaning.      See your eye doctor every 1 to 2 years to screen for conditions such as glaucoma, macular degeneration, cataracts, etc.    Personalized Prevention Plan  You are due for the preventive services outlined below.  Your care team is available to assist you in scheduling these services.  If you have already completed any of these items, please share that information with your care team to update in your medical record.    There are no preventive care reminders to display for this patient.

## 2020-12-08 NOTE — PROGRESS NOTES
"Virginia \"Ashtyn\" YOANA Jon is here for a general check up. She is fasting. She is up to date on eye exams and dental visits. Wears seat belt-yes. Bike helmet- yes.   Concerns today:    HCM  Colonoscopy due 2024  Mammogram is scheduled  DEXA due 2021- on Fosamax  Vaccines are up to date    Advance directive: on file  Hearing concerns: no concerns  Fall Risk: none  Independent at home: yes  Safe : yes  Memory concerns: yes    COGNITIVE SCREEN  1) Repeat 3 items (Banana, Sunrise, Chair)    2) Clock draw:   NORMAL  3) 3 item recall:   Recalls 3 objects  Results: 3 items recalled: COGNITIVE IMPAIRMENT LESS LIKELY    Mini-CogTM Copyright S [place]. Licensed by the author for use in [employer]; reprinted with permission (emely@[email or web address]). All rights reserved.      Weight gain  Ashtyn has gained 8 lbs over the past year, despite regular exercise and paying attention to diet. She eats mostly protein and veggies, limits carbs. Rare Etoh, no soda.   We discussed ideal body weight at 160. Discussed resources including nutritionist at [facility]. She will check with insurance to see if covered.  Wt Readings from Last 2 Encounters:   12/14/20 80.2 kg (176 lb 12 oz)   07/30/20 78.1 kg (172 lb 4 oz)     Body mass index is 28.07 kg/m .    Situational stress  Ashtyn reports situational stress with COVID pandemic, less interaction with friends. She is the one most others rely on so she does not usually reach out.  Discussed self cares and option to do some counseling with our [facility] at [facility]. Rare Etoh use. No SI.    PHQ 12/14/2020   PHQ-9 Total Score 10   Q9: Thoughts of better off dead/self-harm past 2 weeks Not at all     XIOMARA-7 SCORE 12/14/2020   Total Score 5     Onychomycosis  Ashtyn was prescribed Lamisil in July. She does not think it's helping. Nails on both feet are still brittle. We discussed changing treatment to ciclopirox solution.     Osteopenia  Ashtyn had a DEXA 12/2019 with lowest T -2.4 in " the lumbar spine. Left femoral neck T -1.9. She started alendronate in Feb 2020. She reports she is tolerating the medication well. No heartburn or other side effects. She takes a calcium/D supplement and reports she gets an additional 3 servings of dairy in her diet. She regularly exercises.     Hyperlipidemia  Ashtyn is on atorvastatin 10mg daily. She is fasting today. No hx of ASCVD or stroke, nonsmoker. She is due for lab and medication refill.     Health Maintenance   Topic Date Due     MEDICARE ANNUAL WELLNESS VISIT  12/11/2020     FALL RISK ASSESSMENT  07/30/2021     DEXA  12/19/2021     MAMMO SCREENING  12/19/2021     ADVANCE CARE PLANNING  11/13/2022     COLORECTAL CANCER SCREENING  10/29/2024     LIPID  02/10/2025     DTAP/TDAP/TD IMMUNIZATION (3 - Td) 10/25/2027     HEPATITIS C SCREENING  Completed     PHQ-2  Completed     INFLUENZA VACCINE  Completed     Pneumococcal Vaccine: 65+ Years  Completed     ZOSTER IMMUNIZATION  Completed     Pneumococcal Vaccine: Pediatrics (0 to 5 Years) and At-Risk Patients (6 to 64 Years)  Aged Out     IPV IMMUNIZATION  Aged Out     MENINGITIS IMMUNIZATION  Aged Out     HEPATITIS B IMMUNIZATION  Aged Out         Patient Active Problem List   Diagnosis     Cervicalgia     Thyroid nodule     Primary osteoarthritis of knee     Seasonal allergic rhinitis     Rosacea     Symptomatic varicose veins of both lower extremities     Osteopenia of spine     Abnormal fasting glucose     Low bone density     Personal history of irradiation, presenting hazards to health     Cervical adenopathy       Past Surgical History:   Procedure Laterality Date     APPENDECTOMY       BUNIONECTOMY Bilateral 10/2017     CATARACT IOL, RT/LT Right      COLONOSCOPY  2014    repeat in 10 yr     DENTAL SURGERY       MAMMOPLASTY AUGMENTATION Bilateral 1972     RHYTIDECTOMY (FACELIFT)  01/23/2014    neck     SCLEROTHERAPY  10/03/2014    varicose veins     SURGICAL PATHOLOGY EXAM  01/13/2014    lower back      TONSILLECTOMY       TUBAL LIGATION         Family History   Problem Relation Age of Onset     Osteoarthritis Mother      Cerebrovascular Disease Mother 95     Osteoporosis Mother      Hip fracture Mother      Bladder Cancer Father 67     Breast Cancer Sister 53     Heart Disease Brother 50        stents, was smoker     Hyperlipidemia Maternal Aunt      Cerebrovascular Disease Maternal Aunt      Hyperlipidemia Maternal Aunt      Thyroid Disease No family hx of      Thyroid Cancer No family hx of        Social  , 3 children    HABITS:  Tob: never  ETOH: 0-1 per day, rare  Calcium: 1 supplement Ca/D daily + 3 servings dairy per day  Caffeine: 2/day  Exercise: regular, running, walking    OB/GYN HISTORY:  LMP: postmenopausal  G 3   P 3   A 0  Self Breast exam:  Yes, no concerns    Current Outpatient Medications   Medication Sig Dispense Refill     alendronate (FOSAMAX) 70 MG tablet Take 1 tablet (70 mg) by mouth every 7 days 12 tablet 3     atorvastatin (LIPITOR) 10 MG tablet Take 1 tablet (10 mg) by mouth daily 90 tablet 3     calcium carbonate 600 mg-vitamin D 400 units (CALCIUM 600 + D) 600-400 MG-UNIT per tablet Take 1 tablet by mouth 2 times daily 180 tablet 3     celecoxib (CELEBREX) 200 MG capsule Take 1 capsule (200 mg) by mouth daily 90 capsule 3     cetirizine (ZYRTEC) 10 MG tablet Take 1 tablet (10 mg) by mouth daily 90 tablet 3     metroNIDAZOLE (METROGEL) 1 % gel        Omega-3 Fatty Acids (FISH OIL PO)        tretinoin (RETIN-A) 0.1 % cream Apply QHS to face for rosacea       Allergies   Allergen Reactions     Cheese      Fentanyl Other (See Comments)     May have caused pt. To pass out after cataract      Latex Dermatitis     Methohexital Other (See Comments)     May have caused pt. To pass out after cataract      Midazolam Other (See Comments)     May have caused pt. To pass out after cataract surgery     Seasonal Allergies          ROS  CONSTITUTIONAL:NEGATIVE for fever, chills, Positive 8 pound  "weight gain  INTEGUMENTARY/SKIN: toenails brittle, some scaling on toes.   EYES: NEGATIVE for vision changes or irritation  ENT/MOUTH: NEGATIVE for ear, mouth and throat problems  RESP:NEGATIVE for significant cough or SOB  BREAST: NEGATIVE for masses, tenderness or discharge  CV: NEGATIVE for chest pain, palpitations, CERVANTES, orthopnea, PND  or peripheral edema  GI: NEGATIVE for nausea, abdominal pain, heartburn, or change in bowel habits  :NEGATIVE for frequency, dysuria, or hematuria  MUSCULOSKELETAL: hx of osteoarthritis of her knees, bunion repair both feet.   NEURO: NEGATIVE for weakness, dizziness or paresthesias  ENDOCRINE: multinodular goiter, following with Dr. Murry   HEME/ALLERGY/IMMUNE: NEGATIVE for bleeding problems  PSYCHIATRIC: Situational stressors as above  PHQ 12/14/2020   PHQ-9 Total Score 10   Q9: Thoughts of better off dead/self-harm past 2 weeks Not at all     XIOMARA-7 SCORE 12/14/2020   Total Score 5     EXAM  /75 (BP Location: Right arm, Patient Position: Sitting, Cuff Size: Adult Large)   Pulse 81   Temp 97.1  F (36.2  C) (Temporal)   Resp 16   Ht 1.69 m (5' 6.54\")   Wt 80.2 kg (176 lb 12 oz)   SpO2 99%   BMI 28.07 kg/m    GENERAL APPEARANCE: Alert, pleasant, NAD  EYES: PERRL, EOMI, conjunctiva clear  HENT: TM normal bilaterally. Nose and mouth without lesions  NECK: no adenopathy, thyroid nontender.  RESP: lungs clear to auscultation bilaterally,   BREAST: Implants bilaterally, normal exam without masses,  no palpable axillary masses or adenopathy   CV: regular rate and rhythm, normal S1 S2, no murmur, no carotid bruits  ABDOMEN: soft, nontender, without HSM or masses. Bowel sounds normal  MS: extremities normal- no gross deformities noted, no tender, hot or swollen joints.  Well healed incisions on feet, localized thickening over dorsum of right foot  SKIN: scaling along all toes, both feet. Brittle appearing nails bith feet.  NEURO: Normal strength and tone, sensory exam " grossly normal, DTR normoreflexive in upper and lower extremities  PSYCH: mentation appears normal. and affect normal/bright.  EXT: no peripheral edema, pedal pulses palpable    Assessment:  (Z00.00) Routine general medical examination at a health care facility  (primary encounter diagnosis)  Comment: 70 year old woman in good health  Plan: Comprehensive metabolic panel, Hemoglobin A1c         ([place]), CANCELED: Comprehensive Metabolic         Panel ([facility])        Today we discussed ways to maintain a healthy lifestyle with sensible eating, regular exercise and self cares. We dicussed calcium and Vitamin D intake, vaccinations and preventive health screens.  Gave information about scheduling with nutritionist. She will contact insurance to see if this is covered.  Ideal body weight 160 lbs.      (E78.5) Hyperlipidemia LDL goal <130  Comment: on Atorvastatin. 10mg daily  Recent Labs   Lab Test 12/14/20  1356 02/10/20  1431   CHOL 193.0 174.0   HDL 80.0 68.0   .0 95.0   TRIG 45.0 57.0   CHOLHDLRATIO 2.4 2.6       Plan: Lipid Panel ([facility]), atorvastatin (LIPITOR) 10        MG tablet        LDL in target range, refill medication x 1 yr    (M85.88) Osteopenia of lumbar spine  Comment: diagnosed 12/2019, treatment began 2/2020  Plan: alendronate (FOSAMAX) 70 MG tablet        Continue to take medication, discussed adequate calcium 1200mg daily and vitamin D    (B35.1) Onychomycosis  Comment: both feet  Plan: ciclopirox (PENLAC) 8 % external solution        Recommend switching to ciclopirox solution. New rx sent to pharmacy with refills.    (F43.9) Situational stress  Comment: difficult year with pandemic, family stressors  Plan: recommend therapy, gave resource for our [facility]. She may call for an appointment.     Sherlyn Almanzar MD  Internal Medicine/Pediatrics

## 2020-12-14 ENCOUNTER — OFFICE VISIT (OUTPATIENT)
Dept: FAMILY MEDICINE | Facility: CLINIC | Age: 70
End: 2020-12-14
Payer: MEDICARE

## 2020-12-14 VITALS
HEART RATE: 81 BPM | BODY MASS INDEX: 27.74 KG/M2 | SYSTOLIC BLOOD PRESSURE: 121 MMHG | DIASTOLIC BLOOD PRESSURE: 75 MMHG | OXYGEN SATURATION: 99 % | TEMPERATURE: 97.1 F | RESPIRATION RATE: 16 BRPM | WEIGHT: 176.75 LBS | HEIGHT: 67 IN

## 2020-12-14 DIAGNOSIS — Z00.00 ROUTINE GENERAL MEDICAL EXAMINATION AT A HEALTH CARE FACILITY: Primary | ICD-10-CM

## 2020-12-14 DIAGNOSIS — F43.9 SITUATIONAL STRESS: ICD-10-CM

## 2020-12-14 DIAGNOSIS — M85.88 OSTEOPENIA OF LUMBAR SPINE: ICD-10-CM

## 2020-12-14 DIAGNOSIS — E78.5 HYPERLIPIDEMIA LDL GOAL <130: ICD-10-CM

## 2020-12-14 DIAGNOSIS — B35.1 ONYCHOMYCOSIS: ICD-10-CM

## 2020-12-14 LAB
ALBUMIN SERPL-MCNC: 4 G/DL (ref 3.4–5)
ALP SERPL-CCNC: 54 U/L (ref 40–150)
ALT SERPL W P-5'-P-CCNC: 38 U/L (ref 0–50)
ANION GAP SERPL CALCULATED.3IONS-SCNC: 4 MMOL/L (ref 3–14)
AST SERPL W P-5'-P-CCNC: 24 U/L (ref 0–45)
BILIRUB SERPL-MCNC: 0.4 MG/DL (ref 0.2–1.3)
BUN SERPL-MCNC: 15 MG/DL (ref 7–30)
CALCIUM SERPL-MCNC: 9 MG/DL (ref 8.5–10.1)
CHLORIDE SERPL-SCNC: 110 MMOL/L (ref 94–109)
CHOLEST SERPL-MCNC: 193 MG/DL (ref 0–200)
CHOLEST/HDLC SERPL: 2.4 {RATIO} (ref 0–5)
CO2 SERPL-SCNC: 28 MMOL/L (ref 20–32)
CREAT SERPL-MCNC: 0.59 MG/DL (ref 0.52–1.04)
FASTING SPECIMEN: YES
GFR SERPL CREATININE-BSD FRML MDRD: >90 ML/MIN/{1.73_M2}
GLUCOSE SERPL-MCNC: 95 MG/DL (ref 70–99)
HBA1C MFR BLD: 5.6 % (ref 4.1–5.7)
HDLC SERPL-MCNC: 80 MG/DL
LDLC SERPL CALC-MCNC: 104 MG/DL (ref 0–129)
POTASSIUM SERPL-SCNC: 3.7 MMOL/L (ref 3.4–5.3)
PROT SERPL-MCNC: 7.7 G/DL (ref 6.8–8.8)
SODIUM SERPL-SCNC: 142 MMOL/L (ref 133–144)
TRIGL SERPL-MCNC: 45 MG/DL (ref 0–150)
VLDL-CHOLESTEROL: 9 (ref 7–32)

## 2020-12-14 RX ORDER — CICLOPIROX 80 MG/ML
SOLUTION TOPICAL
Qty: 6 ML | Refills: 1 | Status: SHIPPED | OUTPATIENT
Start: 2020-12-14 | End: 2020-12-15

## 2020-12-14 RX ORDER — ATORVASTATIN CALCIUM 10 MG/1
10 TABLET, FILM COATED ORAL DAILY
Qty: 90 TABLET | Refills: 3 | Status: SHIPPED | OUTPATIENT
Start: 2020-12-14 | End: 2021-12-14

## 2020-12-14 RX ORDER — ALENDRONATE SODIUM 70 MG/1
70 TABLET ORAL
Qty: 12 TABLET | Refills: 3 | Status: SHIPPED | OUTPATIENT
Start: 2020-12-14 | End: 2021-12-14

## 2020-12-14 ASSESSMENT — ANXIETY QUESTIONNAIRES
5. BEING SO RESTLESS THAT IT IS HARD TO SIT STILL: NOT AT ALL
6. BECOMING EASILY ANNOYED OR IRRITABLE: MORE THAN HALF THE DAYS
3. WORRYING TOO MUCH ABOUT DIFFERENT THINGS: SEVERAL DAYS
IF YOU CHECKED OFF ANY PROBLEMS ON THIS QUESTIONNAIRE, HOW DIFFICULT HAVE THESE PROBLEMS MADE IT FOR YOU TO DO YOUR WORK, TAKE CARE OF THINGS AT HOME, OR GET ALONG WITH OTHER PEOPLE: SOMEWHAT DIFFICULT
1. FEELING NERVOUS, ANXIOUS, OR ON EDGE: SEVERAL DAYS
GAD7 TOTAL SCORE: 5
2. NOT BEING ABLE TO STOP OR CONTROL WORRYING: NOT AT ALL
7. FEELING AFRAID AS IF SOMETHING AWFUL MIGHT HAPPEN: SEVERAL DAYS

## 2020-12-14 ASSESSMENT — PATIENT HEALTH QUESTIONNAIRE - PHQ9
SUM OF ALL RESPONSES TO PHQ QUESTIONS 1-9: 10
5. POOR APPETITE OR OVEREATING: NOT AT ALL

## 2020-12-14 ASSESSMENT — MIFFLIN-ST. JEOR: SCORE: 1336.97

## 2020-12-14 NOTE — NURSING NOTE
"70 year old  Chief Complaint   Patient presents with     Medicare Visit     70 yrs old fasting        Blood pressure 121/75, pulse 81, temperature 97.1  F (36.2  C), temperature source Temporal, resp. rate 16, height 1.69 m (5' 6.54\"), weight 80.2 kg (176 lb 12 oz), SpO2 99 %. Body mass index is 28.07 kg/m .  Patient Active Problem List   Diagnosis     Cervicalgia     Thyroid nodule     Primary osteoarthritis of knee     Seasonal allergic rhinitis     Rosacea     Symptomatic varicose veins of both lower extremities     Osteopenia of spine     Abnormal fasting glucose     Low bone density     Personal history of irradiation, presenting hazards to health     Cervical adenopathy       Wt Readings from Last 2 Encounters:   12/14/20 80.2 kg (176 lb 12 oz)   07/30/20 78.1 kg (172 lb 4 oz)     BP Readings from Last 3 Encounters:   12/14/20 121/75   07/30/20 135/85   02/26/20 135/80         Current Outpatient Medications   Medication     alendronate (FOSAMAX) 70 MG tablet     atorvastatin (LIPITOR) 10 MG tablet     calcium carbonate 600 mg-vitamin D 400 units (CALCIUM 600 + D) 600-400 MG-UNIT per tablet     celecoxib (CELEBREX) 200 MG capsule     metroNIDAZOLE (METROGEL) 1 % gel     Omega-3 Fatty Acids (FISH OIL PO)     tretinoin (RETIN-A) 0.1 % cream     cetirizine (ZYRTEC) 10 MG tablet     No current facility-administered medications for this visit.        Social History     Tobacco Use     Smoking status: Never Smoker     Smokeless tobacco: Never Used   Substance Use Topics     Alcohol use: Yes     Comment: 0-1 per day     Drug use: No       There are no preventive care reminders to display for this patient.    No results found for: PAP      December 14, 2020 1:08 PM  "

## 2020-12-15 ENCOUNTER — TELEPHONE (OUTPATIENT)
Dept: FAMILY MEDICINE | Facility: CLINIC | Age: 70
End: 2020-12-15

## 2020-12-15 DIAGNOSIS — B35.1 ONYCHOMYCOSIS: ICD-10-CM

## 2020-12-15 RX ORDER — CICLOPIROX 80 MG/ML
SOLUTION TOPICAL
Qty: 6.6 ML | Refills: 1 | Status: SHIPPED | OUTPATIENT
Start: 2020-12-15 | End: 2020-12-17

## 2020-12-15 ASSESSMENT — ANXIETY QUESTIONNAIRES: GAD7 TOTAL SCORE: 5

## 2020-12-15 NOTE — TELEPHONE ENCOUNTER
Pharmacy indicates CICLOPIROX is not covered by insurance. Either prescribe an alternative medication or patient can pay out of pocket.     Mary Jo uHmmel RN  12/15/20  2:36 PM

## 2020-12-17 ENCOUNTER — MYC MEDICAL ADVICE (OUTPATIENT)
Dept: FAMILY MEDICINE | Facility: CLINIC | Age: 70
End: 2020-12-17

## 2020-12-17 DIAGNOSIS — Z12.31 VISIT FOR SCREENING MAMMOGRAM: ICD-10-CM

## 2020-12-17 PROCEDURE — 77063 BREAST TOMOSYNTHESIS BI: CPT | Performed by: RADIOLOGY

## 2020-12-17 PROCEDURE — 77067 SCR MAMMO BI INCL CAD: CPT | Performed by: RADIOLOGY

## 2020-12-17 RX ORDER — CICLOPIROX 80 MG/ML
SOLUTION TOPICAL
Qty: 6.6 ML | Refills: 1 | Status: SHIPPED | OUTPATIENT
Start: 2020-12-17 | End: 2021-03-05

## 2020-12-17 NOTE — TELEPHONE ENCOUNTER
Alternatives listed by insurance formulary include various dosage forms of nystatin which are unlikely to be effective for patient's onychomycosis. Contacted pharmacy to determine OOP cost to patient for medication which is $89 for the prescription. They do take GoodRx which would be ~$42 at [facility], ~$14 at [facility] and ~$17 at [facility].     Followed up with patient to relay options for treatment. Agreed that sending the prescription to [facility] in [place] is a reasonable option for her. Patient agreed to follow up with any further questions or concerns.     Harper Dennison, PharmD, BCACP  Medication Management (MTM) Pharmacist  [facility]

## 2020-12-17 NOTE — TELEPHONE ENCOUNTER
Harper - could you check on this, and contact this pt?  Ifrah Paul RN  [facility]

## 2021-01-22 DIAGNOSIS — M15.8 OTHER OSTEOARTHRITIS INVOLVING MULTIPLE JOINTS: ICD-10-CM

## 2021-01-22 RX ORDER — CELECOXIB 200 MG/1
200 CAPSULE ORAL DAILY
Qty: 90 CAPSULE | Refills: 3 | Status: SHIPPED | OUTPATIENT
Start: 2021-01-22 | End: 2022-03-02

## 2021-01-22 NOTE — TELEPHONE ENCOUNTER
Last Office Visit: 12/14/20  Future [facility] Appointments: NONE  Medication last refilled: 12/11/19 #90 + 3 refill    Routing refill request to provider for review/approval because:    Labs not current:  Last CBC 12/11/19    Age - does not allow for passing protocol    Mary Jo Hummel RN  01/22/21  4:41 PM

## 2021-03-03 DIAGNOSIS — B35.1 ONYCHOMYCOSIS: ICD-10-CM

## 2021-03-03 NOTE — TELEPHONE ENCOUNTER
Last time prescribed: 12/17/20 , 6.6 mL/tabs/caps x 1 refills  Last office visit: 12/14/20  Next appointment: No Future Appointment Scheduled    Routing refill request to provider for review/approval because:  Drug not on the [facility] refill protocol   Ifrah Paul RN  [facility]

## 2021-03-04 NOTE — TELEPHONE ENCOUNTER
Patient called and would like to make sure request is for 3 refills and sent to pharmacy before next week Tuesday.

## 2021-03-05 RX ORDER — CICLOPIROX 80 MG/ML
SOLUTION TOPICAL
Qty: 6.6 ML | Refills: 0 | Status: SHIPPED | OUTPATIENT
Start: 2021-03-05 | End: 2021-12-08

## 2021-03-23 ENCOUNTER — TRANSFERRED RECORDS (OUTPATIENT)
Dept: HEALTH INFORMATION MANAGEMENT | Facility: CLINIC | Age: 71
End: 2021-03-23

## 2021-04-28 DIAGNOSIS — B35.1 ONYCHOMYCOSIS: ICD-10-CM

## 2021-04-28 NOTE — TELEPHONE ENCOUNTER
Last office visit was on 12/14/2020, no future visits scheduled.    Pingel authorized last refill a month ago, with instructions on sig  That if additional refill requested, pt would need a clinic visit.     Have sent this request to  to call pt and assist in making an in clinic visit with Pingel if she needs this med still.     Yoanna El RN  April 29, 2021 1:45 PM

## 2021-04-29 ENCOUNTER — TELEPHONE (OUTPATIENT)
Dept: FAMILY MEDICINE | Facility: CLINIC | Age: 71
End: 2021-04-29

## 2021-04-29 NOTE — TELEPHONE ENCOUNTER
Please call pt to assist her in making a 20 min , in clinic, appt with Pingel if she still wants/needs the med ciclopirox ([place])     We received a refill for this med, and on the last fill for pt a month ago, Yohan stated in instructions if she needed it again she would need to be seen.    Med denied for now, as needs appt     Thanks.    Yoanna El RN  April 29, 2021 1:47 PM

## 2021-05-06 RX ORDER — CICLOPIROX 80 MG/ML
SOLUTION TOPICAL
Qty: 6.6 ML | Refills: 0 | OUTPATIENT
Start: 2021-05-06

## 2021-09-19 ENCOUNTER — HEALTH MAINTENANCE LETTER (OUTPATIENT)
Age: 71
End: 2021-09-19

## 2021-10-07 ENCOUNTER — TRANSFERRED RECORDS (OUTPATIENT)
Dept: HEALTH INFORMATION MANAGEMENT | Facility: CLINIC | Age: 71
End: 2021-10-07
Payer: MEDICARE

## 2021-11-16 ENCOUNTER — TRANSFERRED RECORDS (OUTPATIENT)
Dept: HEALTH INFORMATION MANAGEMENT | Facility: CLINIC | Age: 71
End: 2021-11-16
Payer: MEDICARE

## 2021-11-22 ENCOUNTER — ANCILLARY PROCEDURE (OUTPATIENT)
Dept: ULTRASOUND IMAGING | Facility: CLINIC | Age: 71
End: 2021-11-22
Payer: MEDICARE

## 2021-11-22 DIAGNOSIS — R59.0 CERVICAL ADENOPATHY: ICD-10-CM

## 2021-11-22 DIAGNOSIS — Z92.3 PERSONAL HISTORY OF IRRADIATION, PRESENTING HAZARDS TO HEALTH: ICD-10-CM

## 2021-11-22 DIAGNOSIS — E04.1 THYROID NODULE: ICD-10-CM

## 2021-11-22 PROCEDURE — 76536 US EXAM OF HEAD AND NECK: CPT | Mod: GC | Performed by: RADIOLOGY

## 2021-11-22 PROCEDURE — 76536 US EXAM OF HEAD AND NECK: CPT | Mod: GC | Performed by: STUDENT IN AN ORGANIZED HEALTH CARE EDUCATION/TRAINING PROGRAM

## 2021-11-25 NOTE — PROGRESS NOTES
Chart check :   Review of 11/22/2021 thyroid and neck US as read by me compared with 9/10/2020 12/19  Right superior posterior # 1 1 x 0.6 x 1.1 was  1 x 0.6 x 1.1 cm anechoic (was 0.6 x 0.9 x 1.1 cm)  Right anterior # 2 0.6 x 0.4 x 0.9 was 0.6 x 0.4 x 0.8 cm (was 0.7 x 0.5 x 0.8 cm )- ? microcalc  Right inferior # 3  0.4  X 0.3 x 0.4 cm  was 0.6 x 0.4 x 0.5 cm   Left anterior # 4 0.7 x 0.6 x 0.8 cm appears mixed    was  0.7 x 0.7 x 0.7 hypoechoic  (was 0.7 x 0.6 x 0.7 cm )  Left # 2 0.6 x 0.3 x 0.6 cm was 0.5 x 0.3 x 0.7 cm hypoechoic (was 0.5 x 0.3 x 0.6 cm)  Right level 4 # 1 0.4 x 0.5 x was  0.9 x 0.5 x 0.7 cm FNAB 11/23/2-2- benign    Right level 7 # 1 1.0 x 0.6 x 1.1 cm was 1.3 x 0.7 x 1.1 cm  Left level 3 0.9 x 0.4 x 1.7 cm  was   0.9 x 0.5 x 2 cm (was 0.8 x 0.4 x 1.7 cm)- FNAB 11/23/20- benign   Left level 7 # 1  1.2 x 0.4 x 1.2 was 1.1 x 0.6 x 0.9 cm

## 2021-12-03 ENCOUNTER — TRANSFERRED RECORDS (OUTPATIENT)
Dept: HEALTH INFORMATION MANAGEMENT | Facility: CLINIC | Age: 71
End: 2021-12-03
Payer: MEDICARE

## 2021-12-03 ASSESSMENT — ENCOUNTER SYMPTOMS
MUSCLE WEAKNESS: 0
BACK PAIN: 0
JOINT SWELLING: 1
STIFFNESS: 0
ARTHRALGIAS: 1
MYALGIAS: 0
MUSCLE CRAMPS: 1
NECK PAIN: 0

## 2021-12-08 ENCOUNTER — VIRTUAL VISIT (OUTPATIENT)
Dept: ENDOCRINOLOGY | Facility: CLINIC | Age: 71
End: 2021-12-08
Payer: MEDICARE

## 2021-12-08 DIAGNOSIS — M85.9 LOW BONE DENSITY: Primary | ICD-10-CM

## 2021-12-08 DIAGNOSIS — Z78.0 POSTMENOPAUSAL STATUS: ICD-10-CM

## 2021-12-08 DIAGNOSIS — E04.1 THYROID NODULE: ICD-10-CM

## 2021-12-08 DIAGNOSIS — R59.0 CERVICAL ADENOPATHY: ICD-10-CM

## 2021-12-08 PROCEDURE — 99214 OFFICE O/P EST MOD 30 MIN: CPT | Mod: 95

## 2021-12-08 NOTE — LETTER
12/8/2021       RE: Yanely Jon  [address]  [facility] [address]     Dear Colleague,    Thank you for referring your patient, Yanely Jon, to the [facility] [place] [facility]. Please see a copy of my visit note below.    Ashtyn is a 71 year old who is being evaluated via a billable video visit.      Patient reviewed medications and allergies on mychart and all are correct.     How would you like to obtain your AVS? MyChart  If the video visit is dropped, the invitation should be resent by: Send to e-mail at: [email or web address]  Will anyone else be joining your video visit?   Endocrine video visit progress note    Attending Assessment/Plan :     Multiple thyroid nodules, all subcm with perithyroidal adenopathy She has history of radiation exposure as a child which increases her risk  Repeat US 2 years and see me afterwards    perithyroidal adenopathy- benign cytology on bilateral lateral neck LNs 2020.  Stable on serial  US 11/2021    Low bone density on 12/19 DXA. Family history of hip fracture in her mother.   FRAX 5.9%  hip. (18% major osteoporos)   On alendronate.  Next DXA should be 12/2021- ordered    Post menopausal    Abnormal fasting glucose/ prediabetes by A1c - most recent A1c was normal     History of radiation exposure presenting health risks - Sh[employer] store fluroscope exposure as a child approx age 4    Due to the COVID 19 pandemic this visit was a video visit. The patient gave verbal consent for the visit today.    I have independently reviewed and interpreted labs, imaging as indicated.     Chart review/prep time 1  [id number]  Visit Start time  1827; 1832;   Visit Stop time  1843  _20_ minutes spent on the date of the encounter doing chart review, history and exam, documentation and further activities as noted above.    Olga Murry MD    Chief complaint: HISTORY OF PRESENT  CYDNEY Chamorro presents today , along with  Jairo, for follow up of multiple thyroid nodules, perithyroidal adenopathy and low bone density. Thyroid nodule was lst seen on an MRI  Since then, she has had thyorid US x 3, on 12/2019, 9/10/2020 and 11/22/2021.   Ashtyn recalls exposure to the shoe store fluoroscope at [facility], when she was approximately 4 years of age. I have again reviewed/re-read the neck US from 11/22/2021.    She has been on alendronate since 2/2020. The last DXA was 12/2019     We have the following labs:   10/6/16 HgbA1c 5.6%  12/11/19 TSH 2.58, creatinine 0.7, Ca 9.4, glucose 106, cholesterol 262,   2/26/2020 calcitonin < 2, TPO < 10, HgbA1c 5.7%  12/14/2020 glucose 95, Ca 9, creatinine 0.59    I have reviewed images on pACS  12/19/19 DXA : lowest T-score -2.4 L1-L2 spine.  There is a history of wrist fracture age 4.  There is a family history of hip fracture.  FRAX: 18/5.9% not including chlidhood fracture.  If I include that the major osteoporotic fracture risk is > 20%  Chart check :   Review of 11/22/2021 thyroid and neck US as read by me compared with 9/10/2020 12/19  Right superior posterior # 1 1 x 0.6 x 1.1 was  1 x 0.6 x 1.1 cm anechoic (was 0.6 x 0.9 x 1.1 cm)  Right anterior # 2 0.6 x 0.4 x 0.9 was 0.6 x 0.4 x 0.8 cm (was 0.7 x 0.5 x 0.8 cm )- ? microcalc  Right inferior # 3  0.4  X 0.3 x 0.4 cm  was 0.6 x 0.4 x 0.5 cm   Left anterior # 4 0.7 x 0.6 x 0.8 cm appears mixed    was  0.7 x 0.7 x 0.7 hypoechoic  (was 0.7 x 0.6 x 0.7 cm )  Left # 2 0.6 x 0.3 x 0.6 cm was 0.5 x 0.3 x 0.7 cm hypoechoic (was 0.5 x 0.3 x 0.6 cm)  Right level 4 # 1 0.4 x 0.5 x was  0.9 x 0.5 x 0.7 cm FNAB 11/23/2-2- benign    Right level 7 # 1 1.0 x 0.6 x 1.1 cm was 1.3 x 0.7 x 1.1 cm  Left level 3 0.9 x 0.4 x 1.7 cm  was   0.9 x 0.5 x 2 cm (was 0.8 x 0.4 x 1.7 cm)- FNAB 11/23/20- benign   Left level 7 # 1  1.2 x 0.4 x 1.2 was 1.1 x 0.6 x 0.9 cm      REVIEW OF SYSTEMS    Having knee surgery 12/20  Issue  with swallowing chicken - feels stuck ;   She doesn't think think she has GERD.     Answers for HPI/ROS submitted by the patient on 12/3/2021  General Symptoms: No  Skin Symptoms: No  HENT Symptoms: No  EYE SYMPTOMS: No  HEART SYMPTOMS: No  LUNG SYMPTOMS: No  INTESTINAL SYMPTOMS: No  URINARY SYMPTOMS: No  GYNECOLOGIC SYMPTOMS: No  BREAST SYMPTOMS: No  SKELETAL SYMPTOMS: Yes  BLOOD SYMPTOMS: No  NERVOUS SYSTEM SYMPTOMS: No  MENTAL HEALTH SYMPTOMS: No  Back pain: No  Muscle aches: No  Neck pain: No  Swollen joints: Yes  Joint pain: Yes  Bone pain: Yes  Muscle cramps: Yes  Muscle weakness: No  Joint stiffness: No  Bone fracture: No    Past Medical History  Past Medical History:   Diagnosis Date     Acrochordon      Atypical chest pain 1999    Normal stress test in 1999, performed for atypical chest pain     Benign nevus of skin     Numerous benign skin nevi     Biceps tendonitis      Bilateral bunions      Breast implant leak     Breast implants complicated by a leak. She then underwent replacement and repair.      Chronic fatigue      H/O urinary incontinence     Mild occasional female urinary incontinence which seems to be both urge and stress related.     History of bilateral cataract extraction      Hx of tubal ligation      Left hip pain     Occasional left hip pain due to osteoarthritis and/or trochanteric bursitis.     Lipoma of back 2014    Chronic lipoma of the left back, status post-surgical resection in January 2014     Low back pain     Occasional low back pain with radicular symptoms into the legs.     Osteoarthritis of knees, bilateral      Osteoarthritis of left hip      Osteopenia 09/13/2013     Postmenopausal status      Pubic bone pain     Musculoskeletal pubic bone pain due to a strain.     Rosacea      Seborrheic keratoses      Skin lesions     Atypical melanocytic lesions, status post multiple biopsies by Dr. Rivera in July of 2015. These biposies showed a junctional nevus with moderate atypia,  which is also a dysplastic nevus and the final one was also a compound nevus, with mild atypia which is also a dysplastic nevus.  She was contacted by Dr. Rivera and was told to follow up with her again in July 2016, for re-evaluation of her skin.     Skin tag      Solar elastosis      Solar lentiginosis      Sternal fracture 2015    Mid-sternal fracture after blunt trauma while falling while running up stepsin May 2015. She may have also had some rib fractures as well. These healed with conservative management.     Symptomatic varicose veins of both lower extremities 2015    Lower extremity venous varicosities, intermittently symptomatic, status post evaluation with Dr. Alonso in General Surgery in the summer of 2015.     Symptomatic varicose veins of both lower extremities     Status post injection and compression sclerotherapy     Syncope 2018     Urticaria     Questionable exercise-induced urticaria     Rosacea  Syncope 2018    Past Surgical History:   Procedure Laterality Date     APPENDECTOMY       BUNIONECTOMY Bilateral 10/2017     CATARACT IOL, RT/LT Right      COLONOSCOPY  2014    repeat in 10 yr     DENTAL SURGERY       MAMMOPLASTY AUGMENTATION Bilateral 1972     RHYTIDECTOMY (FACELIFT)  01/23/2014    neck     SCLEROTHERAPY  10/03/2014    varicose veins     SURGICAL PATHOLOGY EXAM  01/13/2014    lower back     TONSILLECTOMY       TUBAL LIGATION         Medications    Current Outpatient Medications   Medication Sig Dispense Refill     alendronate (FOSAMAX) 70 MG tablet Take 1 tablet (70 mg) by mouth every 7 days 12 tablet 3     atorvastatin (LIPITOR) 10 MG tablet Take 1 tablet (10 mg) by mouth daily 90 tablet 3     calcium carbonate 600 mg-vitamin D 400 units (CALCIUM 600 + D) 600-400 MG-UNIT per tablet Take 1 tablet by mouth 2 times daily 180 tablet 3     celecoxib (CELEBREX) 200 MG capsule Take 1 capsule (200 mg) by mouth daily 90 capsule 3     cetirizine (ZYRTEC) 10 MG tablet Take 1 tablet (10 mg) by  mouth daily 90 tablet 3     ciclopirox (PENLAC) 8 % external solution Apply to adjacent skin and affected nails daily.  Remove with alcohol every 7 days, then repeat. Recommend clinic appt if additional refill requested 6.6 mL 0     metroNIDAZOLE (METROGEL) 1 % gel        Omega-3 Fatty Acids (FISH OIL PO)        tretinoin (RETIN-A) 0.1 % cream Apply QHS to face for rosacea       Fish oil  Mg ? sometimes    Allergies  Allergies   Allergen Reactions     Cheese      Fentanyl Other (See Comments)     May have caused pt. To pass out after cataract      Latex Dermatitis     Methohexital Other (See Comments)     May have caused pt. To pass out after cataract      Midazolam Other (See Comments)     May have caused pt. To pass out after cataract surgery     Seasonal Allergies      Family History  family history includes Bladder Cancer (age of onset: 67) in her father; Breast Cancer (age of onset: 53) in her sister; Cerebrovascular Disease in her maternal aunt; Cerebrovascular Disease (age of onset: 95) in her mother; Heart Disease (age of onset: 50) in her brother; Hip fracture in her mother; Hyperlipidemia in her maternal aunt and maternal aunt; Osteoarthritis in her mother; Osteoporosis in her mother.    Social History  Social History     Tobacco Use     Smoking status: Never Smoker     Smokeless tobacco: Never Used   Substance Use Topics     Alcohol use: Yes     Comment: 0-1 per day     Drug use: No       Physical Exam  There were no vitals taken for this visit.  There is no height or weight on file to calculate BMI.   BP Readings from Last 1 Encounters:   12/14/20 121/75      Pulse Readings from Last 1 Encounters:   12/14/20 81      Resp Readings from Last 1 Encounters:   12/14/20 16      Temp Readings from Last 1 Encounters:   12/14/20 97.1  F (36.2  C) (Temporal)      SpO2 Readings from Last 1 Encounters:   12/14/20 99%      Wt Readings from Last 1 Encounters:   12/14/20 80.2 kg (176 lb 12 oz)      Ht Readings from  "Last 1 Encounters:   12/14/20 1.69 m (5' 6.54\")     GENERAL: no distress; her  is partially on camera, sitting nearby  SKIN: Visible skin clear.   EYES: Eyes grossly normal to inspection.    RESP: No audible wheeze, cough, or visible cyanosis.  No visible retractions or increased work of breathing.    NEURO:  Awake, alert, responds appropriately to questions.  Mentation and speech fluent.  PSYCH:affect normal,  and appearance well-groomed.    DATA REVIEW    US THYROID 11/22/2021 10:28 AMCOMPARISON: Ultrasound of the thyroid dated 9/10/2020 12/19/2019  HISTORY: Cervical adenopathy; Thyroid nodule; Personal history oirradiation, presenting hazards to health     FINDINGS:   Thyroid parenchyma: homogenous  The right lobe of the thyroid measures: 3.5 x 1.7 x 1.8 cm  The left lobe of the thyroid measures: 3.7 x 1.6 x 1.5 cm  The thyroid isthmus measures: 0.4 cm     Nodule 1:  Lobe: Right  Location: Upper pole  Size: 1.1 x 1.0 x 0.6 cm, previously 1.1 x 1.0 x 0.6 cm  Composition: Cystic or almost completely cystic (0 points)  Echogenicity: Anechoic (0 points)  Shape: Wider than tall (0 points)  Margin: Smooth (0 points)  Echogenic Foci: Peripheral/rim calcifications (2 points)  Stability: No significant change in size  TIRADS: TR2 (1-2 points) Not suspicious     Nodule 2:  Lobe: Right  Location: mid pole  Size: 0.9 x 0.6 x 0.4 cm, previously 0.8 x 0.6 x 0.4 cm  Composition: Solid or almost completely solid (2 points)  Echogenicity: Hyperechoic or isoechoic (1 point)  Shape: Wider than tall (0 points)  Margin: Ill-defined (0 points)  Echogenic Foci: None or large comet tail artifact (0 points)  Stability: No significant change in size  TIRADS: TR3 (3 points)      Nodule 3:  Lobe: Right  Location: Inferior pole  Size: 0.4 x 0.4 x 0.3 cm, previously 0.5 x 0.6 x 0.4 cm  Composition: Solid or almost completely solid (2 points)  Echogenicity: Hypoechoic (2 points)  Shape: Wider than tall (0 points)  Margin: Smooth (0 " points)  Echogenic Foci: None or large comet tail artifact (0 points)  Stability: No significant change in size  TIRADS: TR4 (4-6 points)      Nodule 4:  Lobe: Left  Location: Inferior pole  Size: 0.8 x 0.7 x 0.6 cm, previously 0.7 x 0.7 x 0.7 cm  Composition: Mixed cystic and solid (1 point)  Echogenicity: Hyperechoic or isoechoic (1 point)  Shape: Wider than tall (0 points)  Margin: Smooth (0 points)  Echogenic Foci: None or large comet tail artifact (0 points)  Stability: No significant change in size  TIRADS: TR2 (1-2 points) Not suspicious     Nodule 5:  Lobe: Left  Location: Inferior pole  Size: 0.6 x 0.6 x 0.3 cm, previously 0.7 x 0.5 x 0.3 cm  Composition: Solid or almost completely solid (2 points)  Echogenicity: Hyperechoic or isoechoic (1 point)  Shape: Wider than tall (0 points)  Margin: Smooth (0 points)  Echogenic Foci: None or large comet tail artifact (0 points)  Stability: No significant change in size  TIRADS: TR3 (3 points)                                                             Impression:  Multiple subcentimeter thyroid nodules as described above. These are stable since prior studies. None meet criteria for FNA per TIRADS  criteria.     ACR TI-RADS recommendations  TR2 (2 points) & TR1 (0 points) -No FNA or follow-up  TR3 (3 points) - FNA if ? 2.5cm, follow-up if 1.5 -2.4 cm in 1, 3 and  5 years  TR4 (4-6 points) - FNA if ? 1.5cm, follow-up if 1 -1.4 cm in 1, 2, 3  and 5 years  TR5 (?7 points) - FNA if ? 1cm, follow-up if 0.5 -0.9 cm every year  for 5 years      I have personally reviewed the examination and initial interpretation  and I agree with the findings.     RUDOLPH WOODS,       EXAMINATION: US HEAD NECK SOFT TISSUE, 11/22/2021 10:35 AM COMPARISON: Ultrasound of the neck dated 9/10/2020, 11/23/2020HISTORY: Cervical adenopathy.  TECHNIQUE: Sonographic imaging performed of the neck to evaluate forymph nodes using grey scale and limited Doppler techniqueFINDINGS:Lymph nodes are  measured bilaterally with measurements given I transverse, AP, and craniocaudal dimensions as follows:  Right:   Level 2: Hypoechoic reniform lymph node measuring 1.4 x 0.6 x 1.3 cm,previously 0.9 x 0.6 x 1.3 cm with preserved fatty hilum. Hypoechoicoval lymph node with no distinct fatty hilum measures 0.5 x 0.3 x 0.4 cm  Level 3: No abnormal enlarged lymph nodes.  Level 4: Reniform hypodense lymph node with preserved fatty hilummeasuring 0.9 x 0.5 x 0.7 cm, previously 0.9 x 0.5 x 2.7 cm. Previously benign by fine needle aspiration.  Level 5: No abnormal enlarged lymph nodes.  Level 6: No abnormal enlarged lymph nodes.  Level 7: Reniform hypoechoic lymph node with preserved fatty hilummeasuring 1.0 x 0.6 x 1.1.     Left:  Level 2: Reniform hypoechoic lymph node with preserved fatty hilummeasuring 1.0 x 0.6 x 1.5 cm, previously 1.3 x 0.7 x 1.5 cm. hypoechoic reniform lymph node with preserved fatty hilum measuring0.6 x 0.5 x 1.3 cm, previously 0.8 x 0.5 x 1.2 cm.  Level 3: 0.9 x 0.4 x 1.7 cm, previously benign by fine needleaspiration.     Level 4: No abnormal enlarged lymph nodes.  Level 5: No abnormal enlarged lymph nodes.  Level 6: No abnormal enlarged lymph nodes.  Level 7: 1.2 x 0.5 x 1.2 cm hypoechoic oval lymph node with preservedfatty hilum.                                                                 IMPRESSION:  Soft tissue neck ultrasound with lymph node measurementsas described above. No abnormal appearing lymph nodes present.  I have personally reviewed the examination and initial interpretationand I agree with the findings.  HECTOR WINSLOW MD

## 2021-12-08 NOTE — PROGRESS NOTES
Ashtyn is a 71 year old who is being evaluated via a billable video visit.      Patient reviewed medications and allergies on [employer] and all are correct.     How would you like to obtain your AVS? [employer]  If the video visit is dropped, the invitation should be resent by: Send to e-mail at: [email or web address]  Will anyone else be joining your video visit?   Endocrine video visit progress note    Attending Assessment/Plan :     Multiple thyroid nodules, all subcm with perithyroidal adenopathy She has history of radiation exposure as a child which increases her risk  Repeat US 2 years and see me afterwards    perithyroidal adenopathy- benign cytology on bilateral lateral neck LNs 2020.  Stable on serial  US 11/2021    Low bone density on 12/19 DXA. Family history of hip fracture in her mother.   FRAX 5.9%  hip. (18% major osteoporos)   On alendronate.  Next DXA should be 12/2021- ordered    Post menopausal    Abnormal fasting glucose/ prediabetes by A1c - most recent A1c was normal     History of radiation exposure presenting health risks - [employer]oe store fluroscope exposure as a child approx age 4    Due to the COVID 19 pandemic this visit was a video visit. The patient gave verbal consent for the visit today.    I have independently reviewed and interpreted labs, imaging as indicated.     Chart review/prep time 1  [id number]  Visit Start time  1827; 1832;   Visit Stop time  1843  _20_ minutes spent on the date of the encounter doing chart review, history and exam, documentation and further activities as noted above.    Olga Murry MD    Chief complaint: HISTORY OF PRESENT ILLNESS  Ashtyn presents today , along with  Jairo, for follow up of multiple thyroid nodules, perithyroidal adenopathy and low bone density. Thyroid nodule was lst seen on an MRI  Since then, she has had thyorid US x 3, on 12/2019, 9/10/2020 and 11/22/2021.   Ashtyn recalls exposure to the shoe store fluoroscope at [facility], when she  was approximately 4 years of age. I have again reviewed/re-read the neck US from 11/22/2021.    She has been on alendronate since 2/2020. The last DXA was 12/2019     We have the following labs:   10/6/16 HgbA1c 5.6%  12/11/19 TSH 2.58, creatinine 0.7, Ca 9.4, glucose 106, cholesterol 262,   2/26/2020 calcitonin < 2, TPO < 10, HgbA1c 5.7%  12/14/2020 glucose 95, Ca 9, creatinine 0.59    I have reviewed images on pACS  12/19/19 DXA : lowest T-score -2.4 L1-L2 spine.  There is a history of wrist fracture age 4.  There is a family history of hip fracture.  FRAX: 18/5.9% not including chlidhood fracture.  If I include that the major osteoporotic fracture risk is > 20%  Chart check :   Review of 11/22/2021 thyroid and neck US as read by me compared with 9/10/2020 12/19  Right superior posterior # 1 1 x 0.6 x 1.1 was  1 x 0.6 x 1.1 cm anechoic (was 0.6 x 0.9 x 1.1 cm)  Right anterior # 2 0.6 x 0.4 x 0.9 was 0.6 x 0.4 x 0.8 cm (was 0.7 x 0.5 x 0.8 cm )- ? microcalc  Right inferior # 3  0.4  X 0.3 x 0.4 cm  was 0.6 x 0.4 x 0.5 cm   Left anterior # 4 0.7 x 0.6 x 0.8 cm appears mixed    was  0.7 x 0.7 x 0.7 hypoechoic  (was 0.7 x 0.6 x 0.7 cm )  Left # 2 0.6 x 0.3 x 0.6 cm was 0.5 x 0.3 x 0.7 cm hypoechoic (was 0.5 x 0.3 x 0.6 cm)  Right level 4 # 1 0.4 x 0.5 x was  0.9 x 0.5 x 0.7 cm FNAB 11/23/2-2- benign    Right level 7 # 1 1.0 x 0.6 x 1.1 cm was 1.3 x 0.7 x 1.1 cm  Left level 3 0.9 x 0.4 x 1.7 cm  was   0.9 x 0.5 x 2 cm (was 0.8 x 0.4 x 1.7 cm)- FNAB 11/23/20- benign   Left level 7 # 1  1.2 x 0.4 x 1.2 was 1.1 x 0.6 x 0.9 cm      REVIEW OF SYSTEMS    Having knee surgery 12/20  Issue with swallowing chicken - feels stuck ;   She doesn't think think she has GERD.     Answers for HPI/ROS submitted by the patient on 12/3/2021  General Symptoms: No  Skin Symptoms: No  HENT Symptoms: No  EYE SYMPTOMS: No  HEART SYMPTOMS: No  LUNG SYMPTOMS: No  INTESTINAL SYMPTOMS: No  URINARY SYMPTOMS: No  GYNECOLOGIC SYMPTOMS: No  BREAST  SYMPTOMS: No  SKELETAL SYMPTOMS: Yes  BLOOD SYMPTOMS: No  NERVOUS SYSTEM SYMPTOMS: No  MENTAL HEALTH SYMPTOMS: No  Back pain: No  Muscle aches: No  Neck pain: No  Swollen joints: Yes  Joint pain: Yes  Bone pain: Yes  Muscle cramps: Yes  Muscle weakness: No  Joint stiffness: No  Bone fracture: No    Past Medical History  Past Medical History:   Diagnosis Date     Acrochordon      Atypical chest pain 1999    Normal stress test in 1999, performed for atypical chest pain     Benign nevus of skin     Numerous benign skin nevi     Biceps tendonitis      Bilateral bunions      Breast implant leak     Breast implants complicated by a leak. She then underwent replacement and repair.      Chronic fatigue      H/O urinary incontinence     Mild occasional female urinary incontinence which seems to be both urge and stress related.     History of bilateral cataract extraction      Hx of tubal ligation      Left hip pain     Occasional left hip pain due to osteoarthritis and/or trochanteric bursitis.     Lipoma of back 2014    Chronic lipoma of the left back, status post-surgical resection in January 2014     Low back pain     Occasional low back pain with radicular symptoms into the legs.     Osteoarthritis of knees, bilateral      Osteoarthritis of left hip      Osteopenia 09/13/2013     Postmenopausal status      Pubic bone pain     Musculoskeletal pubic bone pain due to a strain.     Rosacea      Seborrheic keratoses      Skin lesions     Atypical melanocytic lesions, status post multiple biopsies by Dr. Rivera in July of 2015. These biposies showed a junctional nevus with moderate atypia, which is also a dysplastic nevus and the final one was also a compound nevus, with mild atypia which is also a dysplastic nevus.  She was contacted by Dr. Rivera and was told to follow up with her again in July 2016, for re-evaluation of her skin.     Skin tag      Solar elastosis      Solar lentiginosis      Sternal fracture 2015     Mid-sternal fracture after blunt trauma while falling while running up stepsin May 2015. She may have also had some rib fractures as well. These healed with conservative management.     Symptomatic varicose veins of both lower extremities 2015    Lower extremity venous varicosities, intermittently symptomatic, status post evaluation with Dr. Alonso in General Surgery in the summer of 2015.     Symptomatic varicose veins of both lower extremities     Status post injection and compression sclerotherapy     Syncope 2018     Urticaria     Questionable exercise-induced urticaria     Rosacea  Syncope 2018    Past Surgical History:   Procedure Laterality Date     APPENDECTOMY       BUNIONECTOMY Bilateral 10/2017     CATARACT IOL, RT/LT Right      COLONOSCOPY  2014    repeat in 10 yr     DENTAL SURGERY       MAMMOPLASTY AUGMENTATION Bilateral 1972     RHYTIDECTOMY (FACELIFT)  01/23/2014    neck     SCLEROTHERAPY  10/03/2014    varicose veins     SURGICAL PATHOLOGY EXAM  01/13/2014    lower back     TONSILLECTOMY       TUBAL LIGATION         Medications    Current Outpatient Medications   Medication Sig Dispense Refill     alendronate (FOSAMAX) 70 MG tablet Take 1 tablet (70 mg) by mouth every 7 days 12 tablet 3     atorvastatin (LIPITOR) 10 MG tablet Take 1 tablet (10 mg) by mouth daily 90 tablet 3     calcium carbonate 600 mg-vitamin D 400 units (CALCIUM 600 + D) 600-400 MG-UNIT per tablet Take 1 tablet by mouth 2 times daily 180 tablet 3     celecoxib (CELEBREX) 200 MG capsule Take 1 capsule (200 mg) by mouth daily 90 capsule 3     cetirizine (ZYRTEC) 10 MG tablet Take 1 tablet (10 mg) by mouth daily 90 tablet 3     ciclopirox (PENLAC) 8 % external solution Apply to adjacent skin and affected nails daily.  Remove with alcohol every 7 days, then repeat. Recommend clinic appt if additional refill requested 6.6 mL 0     metroNIDAZOLE (METROGEL) 1 % gel        Omega-3 Fatty Acids (FISH OIL PO)        tretinoin (RETIN-A) 0.1 %  "cream Apply QHS to face for rosacea       Fish oil  Mg ? sometimes    Allergies  Allergies   Allergen Reactions     Cheese      Fentanyl Other (See Comments)     May have caused pt. To pass out after cataract      Latex Dermatitis     Methohexital Other (See Comments)     May have caused pt. To pass out after cataract      Midazolam Other (See Comments)     May have caused pt. To pass out after cataract surgery     Seasonal Allergies      Family History  family history includes Bladder Cancer (age of onset: 67) in her father; Breast Cancer (age of onset: 53) in her sister; Cerebrovascular Disease in her maternal aunt; Cerebrovascular Disease (age of onset: 95) in her mother; Heart Disease (age of onset: 50) in her brother; Hip fracture in her mother; Hyperlipidemia in her maternal aunt and maternal aunt; Osteoarthritis in her mother; Osteoporosis in her mother.    Social History  Social History     Tobacco Use     Smoking status: Never Smoker     Smokeless tobacco: Never Used   Substance Use Topics     Alcohol use: Yes     Comment: 0-1 per day     Drug use: No       Physical Exam  There were no vitals taken for this visit.  There is no height or weight on file to calculate BMI.   BP Readings from Last 1 Encounters:   12/14/20 121/75      Pulse Readings from Last 1 Encounters:   12/14/20 81      Resp Readings from Last 1 Encounters:   12/14/20 16      Temp Readings from Last 1 Encounters:   12/14/20 97.1  F (36.2  C) (Temporal)      SpO2 Readings from Last 1 Encounters:   12/14/20 99%      Wt Readings from Last 1 Encounters:   12/14/20 80.2 kg (176 lb 12 oz)      Ht Readings from Last 1 Encounters:   12/14/20 1.69 m (5' 6.54\")     GENERAL: no distress; her  is partially on camera, sitting nearby  SKIN: Visible skin clear.   EYES: Eyes grossly normal to inspection.    RESP: No audible wheeze, cough, or visible cyanosis.  No visible retractions or increased work of breathing.    NEURO:  Awake, alert, responds " appropriately to questions.  Mentation and speech fluent.  PSYCH:affect normal,  and appearance well-groomed.    DATA REVIEW    US THYROID 11/22/2021 10:28 AMCOMPARISON: Ultrasound of the thyroid dated 9/10/2020 12/19/2019  HISTORY: Cervical adenopathy; Thyroid nodule; Personal history oirradiation, presenting hazards to health     FINDINGS:   Thyroid parenchyma: homogenous  The right lobe of the thyroid measures: 3.5 x 1.7 x 1.8 cm  The left lobe of the thyroid measures: 3.7 x 1.6 x 1.5 cm  The thyroid isthmus measures: 0.4 cm     Nodule 1:  Lobe: Right  Location: Upper pole  Size: 1.1 x 1.0 x 0.6 cm, previously 1.1 x 1.0 x 0.6 cm  Composition: Cystic or almost completely cystic (0 points)  Echogenicity: Anechoic (0 points)  Shape: Wider than tall (0 points)  Margin: Smooth (0 points)  Echogenic Foci: Peripheral/rim calcifications (2 points)  Stability: No significant change in size  TIRADS: TR2 (1-2 points) Not suspicious     Nodule 2:  Lobe: Right  Location: mid pole  Size: 0.9 x 0.6 x 0.4 cm, previously 0.8 x 0.6 x 0.4 cm  Composition: Solid or almost completely solid (2 points)  Echogenicity: Hyperechoic or isoechoic (1 point)  Shape: Wider than tall (0 points)  Margin: Ill-defined (0 points)  Echogenic Foci: None or large comet tail artifact (0 points)  Stability: No significant change in size  TIRADS: TR3 (3 points)      Nodule 3:  Lobe: Right  Location: Inferior pole  Size: 0.4 x 0.4 x 0.3 cm, previously 0.5 x 0.6 x 0.4 cm  Composition: Solid or almost completely solid (2 points)  Echogenicity: Hypoechoic (2 points)  Shape: Wider than tall (0 points)  Margin: Smooth (0 points)  Echogenic Foci: None or large comet tail artifact (0 points)  Stability: No significant change in size  TIRADS: TR4 (4-6 points)      Nodule 4:  Lobe: Left  Location: Inferior pole  Size: 0.8 x 0.7 x 0.6 cm, previously 0.7 x 0.7 x 0.7 cm  Composition: Mixed cystic and solid (1 point)  Echogenicity: Hyperechoic or isoechoic (1  point)  Shape: Wider than tall (0 points)  Margin: Smooth (0 points)  Echogenic Foci: None or large comet tail artifact (0 points)  Stability: No significant change in size  TIRADS: TR2 (1-2 points) Not suspicious     Nodule 5:  Lobe: Left  Location: Inferior pole  Size: 0.6 x 0.6 x 0.3 cm, previously 0.7 x 0.5 x 0.3 cm  Composition: Solid or almost completely solid (2 points)  Echogenicity: Hyperechoic or isoechoic (1 point)  Shape: Wider than tall (0 points)  Margin: Smooth (0 points)  Echogenic Foci: None or large comet tail artifact (0 points)  Stability: No significant change in size  TIRADS: TR3 (3 points)                                                             Impression:  Multiple subcentimeter thyroid nodules as described above. These are stable since prior studies. None meet criteria for FNA per TIRADS  criteria.     ACR TI-RADS recommendations  TR2 (2 points) & TR1 (0 points) -No FNA or follow-up  TR3 (3 points) - FNA if ? 2.5cm, follow-up if 1.5 -2.4 cm in 1, 3 and  5 years  TR4 (4-6 points) - FNA if ? 1.5cm, follow-up if 1 -1.4 cm in 1, 2, 3  and 5 years  TR5 (?7 points) - FNA if ? 1cm, follow-up if 0.5 -0.9 cm every year  for 5 years      I have personally reviewed the examination and initial interpretation  and I agree with the findings.     RUDOLPH WOODS,       EXAMINATION: US HEAD NECK SOFT TISSUE, 11/22/2021 10:35 AM COMPARISON: Ultrasound of the neck dated 9/10/2020, 11/23/2020HISTORY: Cervical adenopathy.  TECHNIQUE: Sonographic imaging performed of the neck to evaluate forymph nodes using grey scale and limited Doppler techniqueFINDINGS:Lymph nodes are measured bilaterally with measurements given I transverse, AP, and craniocaudal dimensions as follows:  Right:   Level 2: Hypoechoic reniform lymph node measuring 1.4 x 0.6 x 1.3 cm,previously 0.9 x 0.6 x 1.3 cm with preserved fatty hilum. Hypoechoicoval lymph node with no distinct fatty hilum measures 0.5 x 0.3 x 0.4 cm  Level 3: No  abnormal enlarged lymph nodes.  Level 4: Reniform hypodense lymph node with preserved fatty hilummeasuring 0.9 x 0.5 x 0.7 cm, previously 0.9 x 0.5 x 2.7 cm. Previously benign by fine needle aspiration.  Level 5: No abnormal enlarged lymph nodes.  Level 6: No abnormal enlarged lymph nodes.  Level 7: Reniform hypoechoic lymph node with preserved fatty hilummeasuring 1.0 x 0.6 x 1.1.     Left:  Level 2: Reniform hypoechoic lymph node with preserved fatty hilummeasuring 1.0 x 0.6 x 1.5 cm, previously 1.3 x 0.7 x 1.5 cm. hypoechoic reniform lymph node with preserved fatty hilum measuring0.6 x 0.5 x 1.3 cm, previously 0.8 x 0.5 x 1.2 cm.  Level 3: 0.9 x 0.4 x 1.7 cm, previously benign by fine needleaspiration.     Level 4: No abnormal enlarged lymph nodes.  Level 5: No abnormal enlarged lymph nodes.  Level 6: No abnormal enlarged lymph nodes.  Level 7: 1.2 x 0.5 x 1.2 cm hypoechoic oval lymph node with preservedfatty hilum.                                                                 IMPRESSION:  Soft tissue neck ultrasound with lymph node measurementsas described above. No abnormal appearing lymph nodes present.  I have personally reviewed the examination and initial interpretationand I agree with the findings.  HECTOR WINSLOW MD

## 2021-12-13 DIAGNOSIS — M85.88 OSTEOPENIA OF LUMBAR SPINE: ICD-10-CM

## 2021-12-13 RX ORDER — ALENDRONATE SODIUM 70 MG/1
70 TABLET ORAL
Qty: 12 TABLET | Refills: 0 | Status: CANCELLED | OUTPATIENT
Start: 2021-12-13

## 2021-12-13 NOTE — PROGRESS NOTES
[address], [address]  [facility] [address]  Phone: [phone]  Fax: [phone]  Primary Provider: Sherlyn Almanzar  Pre-op Performing Provider: SHERLYN ALMANZAR    PREOPERATIVE EVALUATION:  Today's date: 12/14/2021    Yanely Jon is a 71 year old female who presents for a preoperative evaluation.    Surgical Information:  Surgery/Procedure: Right Knee Surgery  Surgery Location: [facility]  Surgeon: Judie Mosher MD  Surgery Date: 12/20/2021  Time of Surgery: 12:00 PM  Where patient plans to recover:  Overnight at [facility] then  At home with family  Fax number for surgical facility:     Type of Anesthesia Anticipated: General    Assessment & Plan   (Z01.818) Preop general physical exam  (primary encounter diagnosis)  Comment: Elective right total knee arthroplasty  Plan: Comprehensive metabolic panel, CBC with         Platelets, EKG 12-lead complete w/read -         Clinics        No contraindication to procedure, proceed as planned.  Pt is instructed to hold fish oil and celebrex and aspirin, starting today. May take remainder of medications on the morning of surgery with a sip of water.  She may use tylenol ES 500mg take 2 tablets three times a day if needed for pain prior to surgery.     (M85.88) Osteopenia of lumbar spine  Comment: history of osteoporosis, on weekly alendronate, (Sundays)  Plan: alendronate (FOSAMAX) 70 MG tablet        Continue alendronate weekly and continue through Feb 2025.    (E78.5) Hyperlipidemia LDL goal <130  Comment: due for labs today, no hx of heart disease or stroke  Plan: atorvastatin (LIPITOR) 10 MG tablet,         Comprehensive metabolic panel, Lipid Panel        Refilled medication for one year.       The proposed surgical procedure is considered INTERMEDIATE risk.    Risks and Recommendations:  The patient has the following additional risks and recommendations for  "perioperative complications:   - No identified additional risk factors other than previously addressed    RECOMMENDATION:  APPROVAL GIVEN to proceed with proposed procedure, without further diagnostic evaluation.     Time spent doing chart review, history and exam, documentation and further activities per the note        Subjective     HPI related to upcoming procedure:   Ashtyn is a 70 yo woman with hx of osteoarthritis of her right knee. She received \"rooster comb injections\" almost 30 yrs ago. More recently, received a cortisone injection (Nov 2021) and a medrol dose pack with some relief.  Knee was swelling and she had pain with ambulation. She has decided to undergo elective total knee arthroscopy on her right knee.     Preop Questions 12/9/2021   1. Have you ever had a heart attack or stroke? No   2. Have you ever had surgery on your heart or blood vessels, such as a stent placement, a coronary artery bypass, or surgery on an artery in your head, neck, heart, or legs? No   3. Do you have chest pain with activity? No   4. Do you have a history of  heart failure? No   5. Do you currently have a cold, bronchitis or symptoms of other infection? No   6. Do you have a cough, shortness of breath, or wheezing? No   7. Do you or anyone in your family have previous history of blood clots? No   8. Do you or does anyone in your family have a serious bleeding problem such as prolonged bleeding following surgeries or cuts? No   9. Have you ever had problems with anemia or been told to take iron pills? No   10. Have you had any abnormal blood loss such as black, tarry or bloody stools, or abnormal vaginal bleeding? No   11. Have you ever had a blood transfusion? No   12. Are you willing to have a blood transfusion if it is medically needed before, during, or after your surgery? Yes   13. Have you or any of your relatives ever had problems with anesthesia? YES - passed out with cataract surgery   14. Do you have sleep apnea, " excessive snoring or daytime drowsiness? No   15. Do you have any artifical heart valves or other implanted medical devices like a pacemaker, defibrillator, or continuous glucose monitor? No   16. Do you have artificial joints? No   17. Are you allergic to latex? YES: avoid exposure       Health Care Directive:  Patient has a Health Care Directive on file    Preoperative Review of :   reviewed - no record of controlled substances prescribed.    Osteopenia of lumbar spine  Ashtyn had DEXA 12/2019 and was found to have advanced osteopenia of her lumbar spine.   Spine T -2.4  Left Femoral neck T -1.9  She was started on alendronate February 27, 2020 . She is tolerating medication well. Plan is to continue calcium intake 1200mg daily, Vitamin D 0993-1348 international unit(s) daily. Her treatment course will continue x 5 yrs, Feb 2025.    Hyperlipidemia LDL goal <130  Ashtyn takes atorvastatin 10mg daily for hyperlipidemia. No history of heart disease or stroke. Nonsmoker. No hx of hypertension. She is in need of medication refills.     Review of Systems  Constitutional, neuro, ENT, endocrine, pulmonary, cardiac, gastrointestinal, genitourinary, musculoskeletal, integument and psychiatric systems are negative, except as otherwise noted.    Patient Active Problem List    Diagnosis Date Noted     Postmenopausal status 12/08/2021     Priority: Medium     Cervical adenopathy 10/26/2020     Priority: Medium     Abnormal fasting glucose 02/26/2020     Priority: Medium     Low bone density 02/26/2020     Priority: Medium     Personal history of irradiation, presenting hazards to health 02/26/2020     Priority: Medium     Osteopenia of spine 09/04/2018     Priority: Medium     DEXA 12/2019  Spine T -2.4  Left Femoral neck T -1.9  Start alendronate February 27, 2020 , continue x 5 yrs       Thyroid nodule 10/25/2017     Priority: Medium     Primary osteoarthritis of knee 10/25/2017     Priority: Medium     knees        Seasonal allergic rhinitis 10/25/2017     Priority: Medium     Rosacea 10/25/2017     Priority: Medium     Cervicalgia 08/19/2016     Priority: Medium     S/p cortisone injections       Symptomatic varicose veins of both lower extremities 01/01/2015     Priority: Medium     Lower extremity venous varicosities, intermittently symptomatic, status post evaluation with Dr. Alonso in General Surgery in the summer of 2015.        Past Medical History:   Diagnosis Date     Acrochordon      Atypical chest pain 1999    Normal stress test in 1999, performed for atypical chest pain     Benign nevus of skin     Numerous benign skin nevi     Biceps tendonitis      Bilateral bunions      Breast implant leak     Breast implants complicated by a leak. She then underwent replacement and repair.      Chronic fatigue      H/O urinary incontinence     Mild occasional female urinary incontinence which seems to be both urge and stress related.     History of bilateral cataract extraction      Hx of tubal ligation      Left hip pain     Occasional left hip pain due to osteoarthritis and/or trochanteric bursitis.     Lipoma of back 2014    Chronic lipoma of the left back, status post-surgical resection in January 2014     Low back pain     Occasional low back pain with radicular symptoms into the legs.     Osteoarthritis of knees, bilateral      Osteoarthritis of left hip      Osteopenia 09/13/2013     Postmenopausal status      Prediabetes 02/2020     Pubic bone pain     Musculoskeletal pubic bone pain due to a strain.     Rosacea      Seborrheic keratoses      Skin lesions     Atypical melanocytic lesions, status post multiple biopsies by Dr. Rivera in July of 2015. These biposies showed a junctional nevus with moderate atypia, which is also a dysplastic nevus and the final one was also a compound nevus, with mild atypia which is also a dysplastic nevus.  She was contacted by Dr. Rivera and was told to follow up with her again in July  2016, for re-evaluation of her skin.     Skin tag      Solar elastosis      Solar lentiginosis      Sternal fracture 2015    Mid-sternal fracture after blunt trauma while falling while running up stepsin May 2015. She may have also had some rib fractures as well. These healed with conservative management.     Symptomatic varicose veins of both lower extremities 2015    Lower extremity venous varicosities, intermittently symptomatic, status post evaluation with Dr. Alonso in General Surgery in the summer of 2015.     Symptomatic varicose veins of both lower extremities     Status post injection and compression sclerotherapy     Syncope 2018     Urticaria     Questionable exercise-induced urticaria     Past Surgical History:   Procedure Laterality Date     APPENDECTOMY       BUNIONECTOMY Bilateral 10/2017     CATARACT IOL, RT/LT Right      COLONOSCOPY  2014    repeat in 10 yr     DENTAL SURGERY       MAMMOPLASTY AUGMENTATION Bilateral 1972     RHYTIDECTOMY (FACELIFT)  01/23/2014    neck     SCLEROTHERAPY  10/03/2014    varicose veins     SURGICAL PATHOLOGY EXAM  01/13/2014    lower back     TONSILLECTOMY       TUBAL LIGATION       Current Outpatient Medications   Medication Sig Dispense Refill     alendronate (FOSAMAX) 70 MG tablet Take 1 tablet (70 mg) by mouth every 7 days 12 tablet 3     atorvastatin (LIPITOR) 10 MG tablet Take 1 tablet (10 mg) by mouth daily 90 tablet 3     calcium carbonate 600 mg-vitamin D 400 units (CALCIUM 600 + D) 600-400 MG-UNIT per tablet Take 1 tablet by mouth 2 times daily 180 tablet 3     celecoxib (CELEBREX) 200 MG capsule Take 1 capsule (200 mg) by mouth daily 90 capsule 3     cetirizine (ZYRTEC) 10 MG tablet Take 1 tablet (10 mg) by mouth daily 90 tablet 3     metroNIDAZOLE (METROGEL) 1 % gel        Omega-3 Fatty Acids (FISH OIL PO)        tretinoin (RETIN-A) 0.1 % cream Apply QHS to face for rosacea         Allergies   Allergen Reactions     Cheese      Fentanyl Other (See Comments)  "    May have caused pt. To pass out after cataract      Latex Dermatitis     Methohexital Other (See Comments)     May have caused pt. To pass out after cataract      Midazolam Other (See Comments)     May have caused pt. To pass out after cataract surgery     Seasonal Allergies         Social History     Tobacco Use     Smoking status: Never Smoker     Smokeless tobacco: Never Used   Substance Use Topics     Alcohol use: Yes     Comment: 0-1 per day     Family History   Problem Relation Age of Onset     Osteoarthritis Mother      Cerebrovascular Disease Mother 95     Osteoporosis Mother      Hip fracture Mother      Bladder Cancer Father 67     Breast Cancer Sister 53     Heart Disease Brother 50        stents, was smoker     Hyperlipidemia Maternal Aunt      Cerebrovascular Disease Maternal Aunt      Hyperlipidemia Maternal Aunt      Thyroid Disease No family hx of      Thyroid Cancer No family hx of      History   Drug Use No         Objective     /84   Pulse 87   Temp 97.6  F (36.4  C)   Ht 1.69 m (5' 6.54\")   Wt 69 kg (152 lb 1.3 oz)   SpO2 98%   BMI 24.15 kg/m      Physical Exam    GENERAL APPEARANCE: healthy, alert and no distress     EYES: EOMI, PERRL     HENT: ear canals and TM's normal and nose and mouth masked     NECK: no adenopathy, thyroid normal to palpation     RESP: lungs clear to auscultation     CV: regular rates and rhythm, normal S1 S2, no murmur, click or rub     ABDOMEN:  soft, nontender, no HSM or masses and bowel sounds normal     MS: extremities normal- no gross deformities noted, no evidence of inflammation in joints, FROM in all extremities.     SKIN: no suspicious lesions or rashes     NEURO: Normal strength and tone, sensory exam grossly normal, mentation intact and speech normal     PSYCH: mentation appears normal. and affect normal/bright  EXT: no edema     Diagnostics:  Results for orders placed or performed in visit on 12/14/21   Comprehensive metabolic panel     Status: " Abnormal   Result Value Ref Range    Sodium 138 133 - 144 mmol/L    Potassium 4.9 3.4 - 5.3 mmol/L    Chloride 105 94 - 109 mmol/L    Carbon Dioxide (CO2) 28 20 - 32 mmol/L    Anion Gap 5 3 - 14 mmol/L    Urea Nitrogen 20 7 - 30 mg/dL    Creatinine 0.64 0.52 - 1.04 mg/dL    Calcium 9.1 8.5 - 10.1 mg/dL    Glucose 102 (H) 70 - 99 mg/dL    Alkaline Phosphatase 40 40 - 150 U/L    AST 24 0 - 45 U/L    ALT 56 (H) 0 - 50 U/L    Protein Total 7.3 6.8 - 8.8 g/dL    Albumin 3.7 3.4 - 5.0 g/dL    Bilirubin Total 0.6 0.2 - 1.3 mg/dL    GFR Estimate 89 >60 mL/min/1.73m2   CBC with Platelets     Status: Abnormal   Result Value Ref Range    WBC Count 6.2 4.0 - 11.0 10e3/uL    RBC Count 3.95 3.80 - 5.20 10e6/uL    Hemoglobin 13.2 11.7 - 15.7 g/dL    Hematocrit 40.6 35.0 - 47.0 %     (H) 78 - 100 fL    MCH 33.4 (H) 26.5 - 33.0 pg    MCHC 32.5 31.5 - 36.5 g/dL    RDW 14.2 10.0 - 15.0 %    Platelet Count 220 150 - 450 10e3/uL   Lipid Panel     Status: None   Result Value Ref Range    Cholesterol 183 <200 mg/dL    Triglycerides 33 <150 mg/dL    Direct Measure HDL 84 >=50 mg/dL    LDL Cholesterol Calculated 92 <=100 mg/dL    Non HDL Cholesterol 99 <130 mg/dL    Patient Fasting > 8hrs? Unknown     Narrative            EKG (12/14/21): appears normal, NSR, rate 82, normal axis, normal intervals, no acute ST/T changes c/w ischemia, no LVH by voltage criteria, unchanged from previous tracings read by Sherlyn Almanzar MD  Internal Medicine/Pediatrics      Revised Cardiac Risk Index (RCRI):  The patient has the following serious cardiovascular risks for perioperative complications:   - No serious cardiac risks = 0 points     RCRI Interpretation: 0 points: Class I (very low risk - 0.4% complication rate)    Signed Electronically by: Sherlyn Almanzar MD  Copy of this evaluation report is provided to requesting physician.

## 2021-12-13 NOTE — TELEPHONE ENCOUNTER
Alendronate (Fosamax) 70 mg    Last Office Visit: 12/14/20  [place] [facility] [place] 12[place]14/21  Medication last refilled: 12/14/20 #12 with 3 refill(s)    Required labs per protocol:    DRUG REF RANGE 12/14/20 12/14/21   Creatinine 0.8-1.25 mg/dL 0.59      Hold refill until after her appointment tomorrow, 12/14/21, with Dr. Almanzar.    Ethel Barnes, RN, BSN

## 2021-12-13 NOTE — PATIENT INSTRUCTIONS
Miralax  1 cap into 8 oz fluid  Take daily along with narcotic to avoid constipation      Preparing for Your Surgery  Getting started  A nurse will call you to review your health history and instructions. They will give you an arrival time based on your scheduled surgery time.  Please be ready to share the following:    Your doctor's clinic name and phone number    Your medical, surgical and anesthesia history    A list of allergies and sensitivities    A list of medicines, including herbal treatments and over-the-counter drugs    Whether the patient has a legal guardian (ask how to send us the papers in advance)  If you have a child who's having surgery, please ask for a copy of Preparing for Your Child's Surgery.    Preparing for surgery    Within 30 days of surgery: Have a pre-op exam (sometimes called an H&P, or History and Physical). This can be done at a clinic or pre-operative center.  ? If you're having a , you may not need this exam. Talk to your care team    At your pre-op exam, talk to your care team about all medicines you take. If you need to stop any medicines before surgery, ask when to start taking them again.  ? We do this for your safety. Many medicines can make you bleed too much during surgery. Some change how well surgery (anesthesia) drugs work.    Call your insurance company to let them know you're having surgery. (If you don't have insurance, call [phone].)    Call your clinic if there's any change in your health. This includes signs of a cold or flu (sore throat, runny nose, cough, rash, fever). It also includes a scrape or scratch near the surgery site.    If you have questions on the day of surgery, call your hospital or surgery center.  Eating and drinking guidelines  For your safety: Unless your surgeon tells you otherwise, follow the guidelines below.    Eat and drink as usual until 8 hours before surgery. After that, no food or milk.    Drink clear liquids until 2 hours  before surgery. These are liquids you can see through, like water, Gatorade and Propel Water. You may also have black coffee and tea (no cream or milk).    Nothing by mouth within 2 hours of surgery. This includes gum, candy and breath mints.    If you drink, stop drinking alcohol the night before surgery.    If your care team tells you to take medicine on the morning of surgery, it's okay to take it with a sip of water.  Preventing infection    Shower or bathe the night before and morning of your surgery. Follow the instructions your clinic gave you. (If no instructions, use regular soap.)    Don't shave or clip hair near your surgery site. We'll remove the hair if needed.    Don't smoke or vape the morning of surgery. You may chew nicotine gum up to 2 hours before surgery. A nicotine patch is okay.  ? Note: Some surgeries require you to completely quit smoking and nicotine. Check with your surgeon.    Your care team will make every effort to keep you safe from infection. We will:  ? Clean our hands often with soap and water (or an alcohol-based hand rub).  ? Clean the skin at your surgery site with a special soap that kills germs.  ? Give you a special gown to keep you warm. (Cold raises the risk of infection.)  ? Wear special hair covers, masks, gowns and gloves during surgery.  ? Give antibiotic medicine, if prescribed. Not all surgeries need antibiotics.  What to bring on the day of surgery    Photo ID and insurance card    Copy of your health care directive, if you have one    Glasses and hearing aides (bring cases)  ? You can't wear contacts during surgery    Inhaler and eye drops, if you use them (tell us about these when you arrive)    CPAP machine or breathing device, if you use them    A few personal items, if spending the night    If you have . . .  ? A pacemaker or ICD (cardiac defibrillator): Bring the ID card.  ? An implanted stimulator: Bring the remote control.  ? A legal guardian: Bring a copy of  the certified (court-stamped) guardianship papers.  Please remove any jewelry, including body piercings. Leave jewelry and other valuables at home.  If you're going home the day of surgery  Important: If you don't follow the rules below, we must cancel your surgery.     Arrange for someone to drive you home after surgery. You may not drive, take a taxi or take public transportation by yourself (unless you'll have local anesthesia only).    Arrange for a responsible adult to stay with you overnight. If you don't, we may keep you in the hospital overnight, and you may need to pay the costs yourself.  Questions?   If you have any questions for your care team, list them here: _________________________________________________________________________________________________________________________________________________________________________________________________________________________________________________________________________________________________________________________  For informational purposes only. Not to replace the advice of your health care provider. Copyright   2003, 2019 [employer]. All rights reserved. Clinically reviewed by Akiko Boswell MD. [employer] [id number] - REV 4/20.    Preparing for Your Surgery  Getting started  A nurse will call you to review your health history and instructions. They will give you an arrival time based on your scheduled surgery time.  Please be ready to share the following:    Your doctor's clinic name and phone number    Your medical, surgical and anesthesia history    A list of allergies and sensitivities    A list of medicines, including herbal treatments and over-the-counter drugs    Whether the patient has a legal guardian (ask how to send us the papers in advance)  If you have a child who's having surgery, please ask for a copy of Preparing for Your Child's Surgery.    Preparing for surgery    Within 30 days of surgery: Have a pre-op exam (sometimes  called an H&P, or History and Physical). This can be done at a clinic or pre-operative center.  ? If you're having a , you may not need this exam. Talk to your care team    At your pre-op exam, talk to your care team about all medicines you take. If you need to stop any medicines before surgery, ask when to start taking them again.  ? We do this for your safety. Many medicines can make you bleed too much during surgery. Some change how well surgery (anesthesia) drugs work.    Call your insurance company to let them know you're having surgery. (If you don't have insurance, call [phone].)    Call your clinic if there's any change in your health. This includes signs of a cold or flu (sore throat, runny nose, cough, rash, fever). It also includes a scrape or scratch near the surgery site.    If you have questions on the day of surgery, call your hospital or surgery center.  Eating and drinking guidelines  For your safety: Unless your surgeon tells you otherwise, follow the guidelines below.    Eat and drink as usual until 8 hours before surgery. After that, no food or milk.    Drink clear liquids until 2 hours before surgery. These are liquids you can see through, like water, Gatorade and Propel Water. You may also have black coffee and tea (no cream or milk).    Nothing by mouth within 2 hours of surgery. This includes gum, candy and breath mints.    If you drink, stop drinking alcohol the night before surgery.    If your care team tells you to take medicine on the morning of surgery, it's okay to take it with a sip of water.  Preventing infection    Shower or bathe the night before and morning of your surgery. Follow the instructions your clinic gave you. (If no instructions, use regular soap.)    Don't shave or clip hair near your surgery site. We'll remove the hair if needed.    Don't smoke or vape the morning of surgery. You may chew nicotine gum up to 2 hours before surgery. A nicotine patch is  okay.  ? Note: Some surgeries require you to completely quit smoking and nicotine. Check with your surgeon.    Your care team will make every effort to keep you safe from infection. We will:  ? Clean our hands often with soap and water (or an alcohol-based hand rub).  ? Clean the skin at your surgery site with a special soap that kills germs.  ? Give you a special gown to keep you warm. (Cold raises the risk of infection.)  ? Wear special hair covers, masks, gowns and gloves during surgery.  ? Give antibiotic medicine, if prescribed. Not all surgeries need antibiotics.  What to bring on the day of surgery    Photo ID and insurance card    Copy of your health care directive, if you have one    Glasses and hearing aides (bring cases)  ? You can't wear contacts during surgery    Inhaler and eye drops, if you use them (tell us about these when you arrive)    CPAP machine or breathing device, if you use them    A few personal items, if spending the night    If you have . . .  ? A pacemaker or ICD (cardiac defibrillator): Bring the ID card.  ? An implanted stimulator: Bring the remote control.  ? A legal guardian: Bring a copy of the certified (court-stamped) guardianship papers.  Please remove any jewelry, including body piercings. Leave jewelry and other valuables at home.  If you're going home the day of surgery  Important: If you don't follow the rules below, we must cancel your surgery.     Arrange for someone to drive you home after surgery. You may not drive, take a taxi or take public transportation by yourself (unless you'll have local anesthesia only).    Arrange for a responsible adult to stay with you overnight. If you don't, we may keep you in the hospital overnight, and you may need to pay the costs yourself.  Questions?   If you have any questions for your care team, list them here:  _________________________________________________________________________________________________________________________________________________________________________________________________________________________________________________________________________________________________________________________  For informational purposes only. Not to replace the advice of your health care provider. Copyright   2003, 2019 [facility]. All rights reserved. Clinically reviewed by Akiko Boswell MD. [employer] [id number] - REV 4/20.

## 2021-12-14 ENCOUNTER — OFFICE VISIT (OUTPATIENT)
Dept: FAMILY MEDICINE | Facility: CLINIC | Age: 71
End: 2021-12-14
Payer: COMMERCIAL

## 2021-12-14 VITALS
DIASTOLIC BLOOD PRESSURE: 84 MMHG | TEMPERATURE: 97.6 F | OXYGEN SATURATION: 98 % | SYSTOLIC BLOOD PRESSURE: 135 MMHG | HEART RATE: 87 BPM | BODY MASS INDEX: 23.87 KG/M2 | HEIGHT: 67 IN | WEIGHT: 152.08 LBS

## 2021-12-14 DIAGNOSIS — M85.88 OSTEOPENIA OF LUMBAR SPINE: ICD-10-CM

## 2021-12-14 DIAGNOSIS — E78.5 HYPERLIPIDEMIA LDL GOAL <130: ICD-10-CM

## 2021-12-14 DIAGNOSIS — R71.8 ELEVATED MCV: ICD-10-CM

## 2021-12-14 DIAGNOSIS — Z01.818 PREOP GENERAL PHYSICAL EXAM: Primary | ICD-10-CM

## 2021-12-14 LAB
ALBUMIN SERPL-MCNC: 3.7 G/DL (ref 3.4–5)
ALP SERPL-CCNC: 40 U/L (ref 40–150)
ALT SERPL W P-5'-P-CCNC: 56 U/L (ref 0–50)
ANION GAP SERPL CALCULATED.3IONS-SCNC: 5 MMOL/L (ref 3–14)
AST SERPL W P-5'-P-CCNC: 24 U/L (ref 0–45)
BILIRUB SERPL-MCNC: 0.6 MG/DL (ref 0.2–1.3)
BUN SERPL-MCNC: 20 MG/DL (ref 7–30)
CALCIUM SERPL-MCNC: 9.1 MG/DL (ref 8.5–10.1)
CHLORIDE BLD-SCNC: 105 MMOL/L (ref 94–109)
CO2 SERPL-SCNC: 28 MMOL/L (ref 20–32)
CREAT SERPL-MCNC: 0.64 MG/DL (ref 0.52–1.04)
ERYTHROCYTE [DISTWIDTH] IN BLOOD BY AUTOMATED COUNT: 14.2 % (ref 10–15)
GFR SERPL CREATININE-BSD FRML MDRD: 89 ML/MIN/1.73M2
GLUCOSE BLD-MCNC: 102 MG/DL (ref 70–99)
HCT VFR BLD AUTO: 40.6 % (ref 35–47)
HGB BLD-MCNC: 13.2 G/DL (ref 11.7–15.7)
MCH RBC QN AUTO: 33.4 PG (ref 26.5–33)
MCHC RBC AUTO-ENTMCNC: 32.5 G/DL (ref 31.5–36.5)
MCV RBC AUTO: 103 FL (ref 78–100)
PLATELET # BLD AUTO: 220 10E3/UL (ref 150–450)
POTASSIUM BLD-SCNC: 4.9 MMOL/L (ref 3.4–5.3)
PROT SERPL-MCNC: 7.3 G/DL (ref 6.8–8.8)
RBC # BLD AUTO: 3.95 10E6/UL (ref 3.8–5.2)
SODIUM SERPL-SCNC: 138 MMOL/L (ref 133–144)
WBC # BLD AUTO: 6.2 10E3/UL (ref 4–11)

## 2021-12-14 PROCEDURE — 85027 COMPLETE CBC AUTOMATED: CPT | Performed by: INTERNAL MEDICINE

## 2021-12-14 PROCEDURE — 80053 COMPREHEN METABOLIC PANEL: CPT | Performed by: INTERNAL MEDICINE

## 2021-12-14 PROCEDURE — 80061 LIPID PANEL: CPT | Performed by: INTERNAL MEDICINE

## 2021-12-14 RX ORDER — ALENDRONATE SODIUM 70 MG/1
70 TABLET ORAL
Qty: 12 TABLET | Refills: 3 | Status: SHIPPED | OUTPATIENT
Start: 2021-12-14 | End: 2023-01-02

## 2021-12-14 RX ORDER — ATORVASTATIN CALCIUM 10 MG/1
10 TABLET, FILM COATED ORAL DAILY
Qty: 90 TABLET | Refills: 3 | Status: SHIPPED | OUTPATIENT
Start: 2021-12-14 | End: 2023-01-30

## 2021-12-14 ASSESSMENT — ANXIETY QUESTIONNAIRES
6. BECOMING EASILY ANNOYED OR IRRITABLE: NOT AT ALL
2. NOT BEING ABLE TO STOP OR CONTROL WORRYING: NOT AT ALL
5. BEING SO RESTLESS THAT IT IS HARD TO SIT STILL: NOT AT ALL
IF YOU CHECKED OFF ANY PROBLEMS ON THIS QUESTIONNAIRE, HOW DIFFICULT HAVE THESE PROBLEMS MADE IT FOR YOU TO DO YOUR WORK, TAKE CARE OF THINGS AT HOME, OR GET ALONG WITH OTHER PEOPLE: NOT DIFFICULT AT ALL
7. FEELING AFRAID AS IF SOMETHING AWFUL MIGHT HAPPEN: NOT AT ALL
3. WORRYING TOO MUCH ABOUT DIFFERENT THINGS: NOT AT ALL
1. FEELING NERVOUS, ANXIOUS, OR ON EDGE: NOT AT ALL
GAD7 TOTAL SCORE: 0

## 2021-12-14 ASSESSMENT — PATIENT HEALTH QUESTIONNAIRE - PHQ9
5. POOR APPETITE OR OVEREATING: NOT AT ALL
SUM OF ALL RESPONSES TO PHQ QUESTIONS 1-9: 0

## 2021-12-14 ASSESSMENT — MIFFLIN-ST. JEOR: SCORE: 1220.07

## 2021-12-14 NOTE — NURSING NOTE
"71 year old  Chief Complaint   Patient presents with     Pre-Op Exam     right knee replacement       Blood pressure 135/84, pulse 87, temperature 97.6  F (36.4  C), height 1.69 m (5' 6.54\"), weight 69 kg (152 lb 1.3 oz), SpO2 98 %. Body mass index is 24.15 kg/m .  Patient Active Problem List   Diagnosis     Cervicalgia     Thyroid nodule     Primary osteoarthritis of knee     Seasonal allergic rhinitis     Rosacea     Symptomatic varicose veins of both lower extremities     Osteopenia of spine     Abnormal fasting glucose     Low bone density     Personal history of irradiation, presenting hazards to health     Cervical adenopathy     Postmenopausal status       Wt Readings from Last 2 Encounters:   12/14/21 69 kg (152 lb 1.3 oz)   12/14/20 80.2 kg (176 lb 12 oz)     BP Readings from Last 3 Encounters:   12/14/21 135/84   12/14/20 121/75   07/30/20 135/85         Current Outpatient Medications   Medication     alendronate (FOSAMAX) 70 MG tablet     atorvastatin (LIPITOR) 10 MG tablet     celecoxib (CELEBREX) 200 MG capsule     cetirizine (ZYRTEC) 10 MG tablet     metroNIDAZOLE (METROGEL) 1 % gel     Omega-3 Fatty Acids (FISH OIL PO)     tretinoin (RETIN-A) 0.1 % cream     VITAMIN D PO     calcium carbonate 600 mg-vitamin D 400 units (CALCIUM 600 + D) 600-400 MG-UNIT per tablet     No current facility-administered medications for this visit.       Social History     Tobacco Use     Smoking status: Never Smoker     Smokeless tobacco: Never Used   Substance Use Topics     Alcohol use: Yes     Comment: 0-1 per day     Drug use: No       Health Maintenance Due   Topic Date Due     PHQ-9  06/14/2021     MEDICARE ANNUAL WELLNESS VISIT  12/14/2021     FALL RISK ASSESSMENT  12/14/2021     DEXA  12/19/2021       No results found for: PAP      December 14, 2021 2:34 PM  "

## 2021-12-15 LAB
CHOLEST SERPL-MCNC: 183 MG/DL
FASTING STATUS PATIENT QL REPORTED: NORMAL
HDLC SERPL-MCNC: 84 MG/DL
LDLC SERPL CALC-MCNC: 92 MG/DL
NONHDLC SERPL-MCNC: 99 MG/DL
TRIGL SERPL-MCNC: 33 MG/DL

## 2021-12-15 ASSESSMENT — ANXIETY QUESTIONNAIRES: GAD7 TOTAL SCORE: 0

## 2021-12-16 LAB
FOLATE SERPL-MCNC: 18 NG/ML
VIT B12 SERPL-MCNC: 538 PG/ML (ref 213–816)

## 2021-12-20 ENCOUNTER — TRANSFERRED RECORDS (OUTPATIENT)
Dept: HEALTH INFORMATION MANAGEMENT | Facility: CLINIC | Age: 71
End: 2021-12-20
Payer: MEDICARE

## 2022-02-07 ENCOUNTER — TRANSFERRED RECORDS (OUTPATIENT)
Dept: HEALTH INFORMATION MANAGEMENT | Facility: CLINIC | Age: 72
End: 2022-02-07
Payer: MEDICARE

## 2022-02-28 NOTE — TELEPHONE ENCOUNTER
A user error has taken place: encounter opened in error, closed for administrative reasons.  Mary Jo Hummel MS RN-BC  02/28/22  9:21 AM

## 2022-03-02 DIAGNOSIS — M15.8 OTHER OSTEOARTHRITIS INVOLVING MULTIPLE JOINTS: ICD-10-CM

## 2022-03-02 NOTE — TELEPHONE ENCOUNTER
Medication requested: celecoxib  Last office visit: 12/14/21  [facility] appointments: 4/19/21  Medication last refilled: 1/22/21 #90 + 3 refills  Last qualifying labs: abnormal ALT 12/14/21  Component      Latest Ref Rng & Units 12/14/2021   ALT      0 - 50 U/L 56 (H)     Routing refill request to provider for review/approval because:  Labs out of range:  OBEY Hummel MS RN-BC  03/02/22  4:28 PM

## 2022-03-04 RX ORDER — CELECOXIB 200 MG/1
200 CAPSULE ORAL DAILY
Qty: 90 CAPSULE | Refills: 1 | Status: SHIPPED | OUTPATIENT
Start: 2022-03-04 | End: 2022-06-07

## 2022-03-06 ENCOUNTER — HEALTH MAINTENANCE LETTER (OUTPATIENT)
Age: 72
End: 2022-03-06

## 2022-03-10 ENCOUNTER — TRANSFERRED RECORDS (OUTPATIENT)
Dept: HEALTH INFORMATION MANAGEMENT | Facility: CLINIC | Age: 72
End: 2022-03-10
Payer: MEDICARE

## 2022-04-18 NOTE — PATIENT INSTRUCTIONS
Patient Education   Personalized Prevention Plan  You are due for the preventive services outlined below.  Your care team is available to assist you in scheduling these services.  If you have already completed any of these items, please share that information with your care team to update in your medical record.  Health Maintenance Due   Topic Date Due     FALL RISK ASSESSMENT  12/14/2021     Osteoporosis Screening  12/19/2021     PHQ-2 (once per calendar year)  01/01/2022     Preventive Health Recommendations    See your health care provider every year to    Review health changes.     Discuss preventive care.      Review your medicines if your doctor has prescribed any.    You no longer need a yearly Pap test unless you've had an abnormal Pap test in the past 10 years. If you have vaginal symptoms, such as bleeding or discharge, be sure to talk with your provider about a Pap test.    Every 1 to 2 years, have a mammogram.  If you are over 69, talk with your health care provider about whether or not you want to continue having screening mammograms.    Every 10 years, have a colonoscopy. Or, have a yearly FIT test (stool test). These exams will check for colon cancer.     Have a cholesterol test every 5 years, or more often if your doctor advises it.     Have a diabetes test (fasting glucose) every three years. If you are at risk for diabetes, you should have this test more often.     At age 65, have a bone density scan (DEXA) to check for osteoporosis (brittle bone disease).    Shots:    Get a flu shot each year.    Get a tetanus shot every 10 years.    Talk to your doctor about your pneumonia vaccines. There are now two you should receive - Pneumovax (PPSV 23) and Prevnar (PCV 13).    Talk to your pharmacist about the shingles vaccine.    Talk to your doctor about the hepatitis B vaccine.    Nutrition:     Eat at least 5 servings of fruits and vegetables each day.    Eat whole-grain bread, whole-wheat pasta and  brown rice instead of white grains and rice.    Get adequate Calcium and Vitamin D.     Lifestyle    Exercise at least 150 minutes a week (30 minutes a day, 5 days a week). This will help you control your weight and prevent disease.    Limit alcohol to one drink per day.    No smoking.     Wear sunscreen to prevent skin cancer.     See your dentist twice a year for an exam and cleaning.    See your eye doctor every 1 to 2 years to screen for conditions such as glaucoma, macular degeneration and cataracts.    Personalized Prevention Plan  You are due for the preventive services outlined below.  Your care team is available to assist you in scheduling these services.  If you have already completed any of these items, please share that information with your care team to update in your medical record.  Health Maintenance   Topic Date Due     FALL RISK ASSESSMENT  12/14/2021     DEXA  12/19/2021     PHQ-2 (once per calendar year)  01/01/2022     ADVANCE CARE PLANNING  11/13/2022     MAMMO SCREENING  12/17/2022     MEDICARE ANNUAL WELLNESS VISIT  04/19/2023     COLORECTAL CANCER SCREENING  10/29/2024     LIPID  12/14/2026     DTAP/TDAP/TD IMMUNIZATION (3 - Td or Tdap) 10/25/2027     HEPATITIS C SCREENING  Completed     INFLUENZA VACCINE  Completed     Pneumococcal Vaccine: 65+ Years  Completed     ZOSTER IMMUNIZATION  Completed     COVID-19 Vaccine  Completed     IPV IMMUNIZATION  Aged Out     MENINGITIS IMMUNIZATION  Aged Out     HEPATITIS B IMMUNIZATION  Aged Out

## 2022-04-19 ENCOUNTER — OFFICE VISIT (OUTPATIENT)
Dept: FAMILY MEDICINE | Facility: CLINIC | Age: 72
End: 2022-04-19
Payer: MEDICARE

## 2022-04-19 VITALS
DIASTOLIC BLOOD PRESSURE: 88 MMHG | HEIGHT: 67 IN | SYSTOLIC BLOOD PRESSURE: 142 MMHG | WEIGHT: 157 LBS | TEMPERATURE: 97.3 F | BODY MASS INDEX: 24.64 KG/M2 | HEART RATE: 77 BPM

## 2022-04-19 DIAGNOSIS — Z12.31 VISIT FOR SCREENING MAMMOGRAM: ICD-10-CM

## 2022-04-19 DIAGNOSIS — E28.39 ESTROGEN DEFICIENCY: ICD-10-CM

## 2022-04-19 DIAGNOSIS — Z96.651 STATUS POST TOTAL RIGHT KNEE REPLACEMENT: ICD-10-CM

## 2022-04-19 DIAGNOSIS — Z00.00 ENCOUNTER FOR MEDICARE ANNUAL WELLNESS EXAM: Primary | ICD-10-CM

## 2022-04-19 DIAGNOSIS — E04.1 THYROID NODULE: ICD-10-CM

## 2022-04-19 RX ORDER — AMOXICILLIN 500 MG/1
CAPSULE ORAL
Qty: 4 CAPSULE | Refills: 1 | Status: SHIPPED | OUTPATIENT
Start: 2022-04-19 | End: 2022-04-28

## 2022-04-19 RX ORDER — ACETAMINOPHEN 325 MG/1
325-650 TABLET ORAL EVERY 4 HOURS PRN
Qty: 90 TABLET | Refills: 3 | COMMUNITY
Start: 2022-04-19 | End: 2022-12-02

## 2022-04-19 NOTE — PROGRESS NOTES
Yanely Jon is a 72 yo woman with hx of thyroid nodules, varicose veins, rosacea, seasonal allergies, osteopenia. She is here for a preventive exam.  She is  fasting. She is up to date on eye exams and dental visits. Wears seat belt-yes. Bike helmet- yes.       HCM  Advanced Directive: on file  COVID vaccine series: recently had Vaccine #4/4  Other vaccinations up to date  Mammogram: due now, no concerns  Colonoscopy: due 2024  DEXA osteopenia, on Alendronate since 2/2020, due for DEXA now      Hearing concerns: No Concern  Fall Risk: No Concern  Independent at home: Yes  Safe : Yes  Memory concerns: No Concern    COGNITIVE SCREEN  1) Repeat 3 items (Banana, Sunrise, Chair)    2) Clock draw: \   NORMAL  3) 3 item recall:   Recalls 3 objects  Results: 3 items recalled: COGNITIVE IMPAIRMENT LESS LIKELY    Mini-CogTM Copyright S [place]. Licensed by the author for use in [facility]; reprinted with permission ([email or web address]@.[email or web address]). All rights reserved.      Thyroid nodules  Ashtyn has left sided thyroid nodules. She was referred for ultrasound and then on to see endocrinologist. None of the nodules required biopsy and it was recommended she follow up with us of her neck. She follows with  Dr. Murry. Due for appt July 2022. Due for US, she is asymptomatic.    Diet: less beef, fish and chicken  Wt Readings from Last 4 Encounters:   04/19/22 71.2 kg (157 lb)   12/14/21 69 kg (152 lb 1.3 oz)   12/14/20 80.2 kg (176 lb 12 oz)   07/30/20 78.1 kg (172 lb 4 oz)     Body mass index is 24.77 kg/m .       Osteopenia  Ashtyn had a DEXA 12/2019 with lowest T -2.4 in the lumbar spine. Left femoral neck T -1.9. She started alendronate in Feb 2020. She reports she is tolerating the medication well. No heartburn or other side effects. She takes a calcium/D supplement and reports she gets an additional 3 servings of dairy in her diet. She regularly exercises.          Health Maintenance   Topic Date Due     FALL RISK  ASSESSMENT  12/14/2021     DEXA  12/19/2021     PHQ-2 (once per calendar year)  01/01/2022     ADVANCE CARE PLANNING  11/13/2022     MAMMO SCREENING  12/17/2022     MEDICARE ANNUAL WELLNESS VISIT  04/19/2023     COLORECTAL CANCER SCREENING  10/29/2024     LIPID  12/14/2026     DTAP/TDAP/TD IMMUNIZATION (3 - Td or Tdap) 10/25/2027     HEPATITIS C SCREENING  Completed     INFLUENZA VACCINE  Completed     Pneumococcal Vaccine: 65+ Years  Completed     ZOSTER IMMUNIZATION  Completed     COVID-19 Vaccine  Completed     IPV IMMUNIZATION  Aged Out     MENINGITIS IMMUNIZATION  Aged Out     HEPATITIS B IMMUNIZATION  Aged Out         Patient Active Problem List   Diagnosis     Cervicalgia     Thyroid nodule     Primary osteoarthritis of knee     Seasonal allergic rhinitis     Rosacea     Symptomatic varicose veins of both lower extremities     Osteopenia of spine     Abnormal fasting glucose     Low bone density     Personal history of irradiation, presenting hazards to health     Cervical adenopathy     Postmenopausal status       Past Surgical History:   Procedure Laterality Date     APPENDECTOMY       BUNIONECTOMY Bilateral 10/2017     CATARACT IOL, RT/LT Right      COLONOSCOPY  2014    repeat in 10 yr     DENTAL SURGERY       MAMMOPLASTY AUGMENTATION Bilateral 1972     RHYTIDECTOMY (FACELIFT)  01/23/2014    neck     SCLEROTHERAPY  10/03/2014    varicose veins     SURGICAL PATHOLOGY EXAM  01/13/2014    lower back     TONSILLECTOMY       TUBAL LIGATION         Family History   Problem Relation Age of Onset     Osteoarthritis Mother      Cerebrovascular Disease Mother 95     Osteoporosis Mother      Hip fracture Mother      Bladder Cancer Father 67     Breast Cancer Sister 53     Heart Disease Brother 50        stents, was smoker     Hyperlipidemia Maternal Aunt      Cerebrovascular Disease Maternal Aunt      Hyperlipidemia Maternal Aunt      Thyroid Disease No family hx of      Thyroid Cancer No family hx of         Social  , 3 children     HABITS:  Tob: never  ETOH: rare  Calcium: 1 supplement Ca/D  4000 international unit(s) D,  daily + 3 servings dairy per day  Caffeine  0-1 per day  Exercise:  walking  2 miles a day     OB/GYN HISTORY:  LMP: postmenopausal  G 3   P 3   A 0  Self Breast exam:  Yes, no concerns    Current Outpatient Medications   Medication Sig Dispense Refill     alendronate (FOSAMAX) 70 MG tablet Take 1 tablet (70 mg) by mouth every 7 days 12 tablet 3     atorvastatin (LIPITOR) 10 MG tablet Take 1 tablet (10 mg) by mouth daily 90 tablet 3     calcium carbonate 600 mg-vitamin D 400 units (CALCIUM 600 + D) 600-400 MG-UNIT per tablet Take 1 tablet by mouth 2 times daily 180 tablet 3     celecoxib (CELEBREX) 200 MG capsule Take 1 capsule (200 mg) by mouth daily 90 capsule 1     cetirizine (ZYRTEC) 10 MG tablet Take 1 tablet (10 mg) by mouth daily 90 tablet 3     metroNIDAZOLE (METROGEL) 1 % gel        Omega-3 Fatty Acids (FISH OIL PO)        tretinoin (RETIN-A) 0.1 % cream Apply QHS to face for rosacea       VITAMIN D PO Take 4,000 Units by mouth daily       Allergies   Allergen Reactions     Cheese      Fentanyl Other (See Comments)     May have caused pt. To pass out after cataract      Latex Dermatitis     Methohexital Other (See Comments)     May have caused pt. To pass out after cataract      Midazolam Other (See Comments)     May have caused pt. To pass out after cataract surgery     Seasonal Allergies          ROS  CONSTITUTIONAL:NEGATIVE for fever, chills, change in weight  INTEGUMENTARY/SKIN: NEGATIVE for worrisome rashes, moles or lesions, metrogel for rosacea, sees dermatologist yearly  Vanessa Aranda MD  EYES: NEGATIVE for vision changes or irritation  ENT/MOUTH: NEGATIVE for ear, mouth and throat problems, year round allergies.  RESP:NEGATIVE for significant cough or SOB  BREAST: NEGATIVE for masses, tenderness or discharge  CV: NEGATIVE for chest pain, palpitations, CERVANTES, orthopnea,  "PND  or peripheral edema  GI: NEGATIVE for nausea, abdominal pain, heartburn, or change in bowel habits  :NEGATIVE for frequency, dysuria, or hematuria  MUSCULOSKELETAL:NEGATIVE for significant arthralgias or myalgia. Osteoarthritis, left knee, s/p synvisc injections. Achy but not red or swollen. . Right total  Knee replacement.   Bilateral hip stretches. No back pain, hx of cervical stenosis, mild. Medrol dose pack, has not used it.  NEURO: NEGATIVE for weakness, dizziness or paresthesias  ENDOCRINE: NEGATIVE for polyuria/dipsia,  temperature intolerance, skin/hair changes, thyroid nodules, relatively asymptomatic  HEME/ALLERGY/IMMUNE: NEGATIVE for bleeding problems  PSYCHIATRIC: NEGATIVE for changes in mood or affect    EXAM  BP (!) 142/88   Pulse 77   Temp 97.3  F (36.3  C)   Ht 1.695 m (5' 6.75\")   Wt 71.2 kg (157 lb)   BMI 24.77 kg/m    GENERAL APPEARANCE: Alert, pleasant, NAD  EYES: PERRL, EOMI, conjunctiva clear  HENT: TM normal bilaterally. Nose and mouth masked  NECK: no adenopathy, palpable smooth, nontender mass at left side of thyroid. No overlying skin changes  RESP: lungs clear to auscultation bilaterally,   BREAST: Saline implants. Exam is normal without masses,  Implants without puckering. no tenderness or nipple discharge and no palpable  axillary masses or adenopathy   CV: regular rate and rhythm, normal S1 S2, no murmur, no carotid bruits  ABDOMEN: soft, nontender, without HSM or masses. Bowel sounds normal  MS: extremities normal- no gross deformities noted, well healed incision over right anterior knee.  no tender, hot or swollen joints.   SKIN: no suspicious lesions or rashes  NEURO: Normal strength and tone, sensory exam grossly normal, DTR normoreflexive in upper and lower extremities  PSYCH: mentation appears normal. and affect normal/bright.  EXT: no peripheral edema, pedal pulses palpable    Assessment:  (Z00.00) Encounter for Medicare annual wellness exam  (primary encounter " diagnosis)  Comment: 71 year old woman in good health  Plan: Today we discussed ways to maintain a healthy lifestyle with sensible eating, regular exercise and self cares. We dicussed calcium and Vitamin D intake, vaccinations and preventive health screens.  Health care directive on file.       (E28.39) Estrogen deficiency  Comment: hx of osteopenia, alendronate started on 2/2020  Plan: Dexa hip/pelvis/spine*        Continue calcium and vitamin D. Obtain DEXA scan    (Z12.31) Visit for screening mammogram  Comment: routine mammogram is due. Has bilateral saline implants, normal exam  Plan: Mammogram - routine screening        Refer for routine mammogram    (Z96.651) Status post total right knee replacement  Comment: doing well with Right TKA. Needs antibiotics prior to dental visit  Plan: amoxicillin (AMOXIL) 500 MG capsule,         acetaminophen (TYLENOL) 325 MG tablet        rx for amoxicillin, take 60 min prior to appt.     (E04.1) Thyroid nodule  Comment: left sided nodule, relatively asymptomatic  Plan: Adult Endocrinology  Referral        Refer back to endocrinologist to determine when US and labs are due.     Sherlyn Almanzar MD  Internal Medicine/Pediatrics

## 2022-04-25 ENCOUNTER — TRANSFERRED RECORDS (OUTPATIENT)
Dept: HEALTH INFORMATION MANAGEMENT | Facility: CLINIC | Age: 72
End: 2022-04-25
Payer: MEDICARE

## 2022-04-25 LAB
ALBUMIN (EXTERNAL): 4.3 G/DL
ALKALINE PHOSPHATASE (EXTERNAL): 42 U/L
AST SERPL-CCNC: 21 U/L
BILIRUB SERPL-MCNC: 0.13 MG/DL
BUN SERPL-MCNC: 19 MG/DL
CHOLESTEROL (EXTERNAL): 160 MG/DL
CREATININE (EXTERNAL): 0.61 MG/DL
GFR ESTIMATED (EXTERNAL): NORMAL ML/MIN/1.73M2
GFR ESTIMATED (IF AFRICAN AMERICAN) (EXTERNAL): NORMAL ML/MIN/1.73M2
GLUCOSE (EXTERNAL): 76 MG/DL (ref 70–99)
HBA1C MFR BLD: 5.4 % (ref 0–5.7)
HDLC SERPL-MCNC: 71.9 MG/DL
LDL CHOLESTEROL (EXTERNAL): 78 MG/DL
PROTEIN TOTAL (EXTERNAL): 6.3 G/DL
TRIGLYCERIDES (EXTERNAL): 46 MG/DL

## 2022-04-28 ENCOUNTER — MYC MEDICAL ADVICE (OUTPATIENT)
Dept: FAMILY MEDICINE | Facility: CLINIC | Age: 72
End: 2022-04-28
Payer: MEDICARE

## 2022-04-28 DIAGNOSIS — Z96.651 STATUS POST TOTAL RIGHT KNEE REPLACEMENT: ICD-10-CM

## 2022-04-28 RX ORDER — AMOXICILLIN 500 MG/1
CAPSULE ORAL
Qty: 4 CAPSULE | Refills: 1 | Status: SHIPPED | OUTPATIENT
Start: 2022-04-28 | End: 2024-05-15

## 2022-05-04 ENCOUNTER — ANCILLARY PROCEDURE (OUTPATIENT)
Dept: MAMMOGRAPHY | Facility: CLINIC | Age: 72
End: 2022-05-04
Attending: INTERNAL MEDICINE
Payer: MEDICARE

## 2022-05-04 ENCOUNTER — ANCILLARY PROCEDURE (OUTPATIENT)
Dept: BONE DENSITY | Facility: CLINIC | Age: 72
End: 2022-05-04
Attending: INTERNAL MEDICINE
Payer: MEDICARE

## 2022-05-04 DIAGNOSIS — E28.39 ESTROGEN DEFICIENCY: ICD-10-CM

## 2022-05-04 DIAGNOSIS — Z12.31 VISIT FOR SCREENING MAMMOGRAM: ICD-10-CM

## 2022-05-04 PROCEDURE — 77063 BREAST TOMOSYNTHESIS BI: CPT | Mod: GC | Performed by: RADIOLOGY

## 2022-05-04 PROCEDURE — 77067 SCR MAMMO BI INCL CAD: CPT | Mod: GC | Performed by: RADIOLOGY

## 2022-05-04 PROCEDURE — 77080 DXA BONE DENSITY AXIAL: CPT | Performed by: INTERNAL MEDICINE

## 2022-06-06 ENCOUNTER — TRANSFERRED RECORDS (OUTPATIENT)
Dept: HEALTH INFORMATION MANAGEMENT | Facility: CLINIC | Age: 72
End: 2022-06-06
Payer: MEDICARE

## 2022-06-07 DIAGNOSIS — M15.8 OTHER OSTEOARTHRITIS INVOLVING MULTIPLE JOINTS: ICD-10-CM

## 2022-06-07 RX ORDER — CELECOXIB 200 MG/1
200 CAPSULE ORAL DAILY
Qty: 90 CAPSULE | Refills: 1 | Status: SHIPPED | OUTPATIENT
Start: 2022-06-07 | End: 2022-12-02

## 2022-06-07 NOTE — TELEPHONE ENCOUNTER
Last visit 4/19/22, no future visit  Prescription approved per [facility] Refill Protocol.  Ifrah Paul RN  [facility]

## 2022-08-05 ENCOUNTER — LAB (OUTPATIENT)
Dept: LAB | Facility: CLINIC | Age: 72
End: 2022-08-05

## 2022-08-05 DIAGNOSIS — Z20.822 ENCOUNTER FOR LABORATORY TESTING FOR COVID-19 VIRUS: ICD-10-CM

## 2022-08-05 LAB — SARS-COV-2 RNA RESP QL NAA+PROBE: NEGATIVE

## 2022-08-05 PROCEDURE — U0005 INFEC AGEN DETEC AMPLI PROBE: HCPCS | Performed by: FAMILY MEDICINE

## 2022-08-26 NOTE — NURSING NOTE
68 year old  Chief Complaint   Patient presents with     [facility]       Blood pressure 158/75, pulse 88, temperature 97.6  F (36.4  C), temperature source Oral, resp. rate 16, weight 73.4 kg (161 lb 12 oz), SpO2 99 %. Body mass index is 25.51 kg/m .  Patient Active Problem List   Diagnosis     Cervicalgia     Thyroid nodule     Primary osteoarthritis of knee     Seasonal allergic rhinitis     Rosacea     Symptomatic varicose veins of both lower extremities     Osteopenia of spine       Wt Readings from Last 2 Encounters:   01/14/19 73.4 kg (161 lb 12 oz)   02/21/18 77.1 kg (170 lb)     BP Readings from Last 3 Encounters:   01/14/19 158/75   10/28/18 129/81   02/21/18 126/83         Current Outpatient Medications   Medication     aspirin 81 MG tablet     celecoxib (CELEBREX) 200 MG capsule     cyanocobalamin (VITAMIN  B-12) 1000 MCG tablet     ESTRIOL 0.5MG/GM CREAM     loratadine (CLARITIN) 10 MG tablet     metroNIDAZOLE (METROGEL) 1 % gel     Omega-3 Fatty Acids (FISH OIL PO)     tretinoin (RETIN-A) 0.1 % cream     ESTRIOL 0.5MG/GM CREAM     omeprazole (PRILOSEC) 20 MG CR capsule     No current facility-administered medications for this visit.        Social History     Tobacco Use     Smoking status: Never Smoker     Smokeless tobacco: Never Used   Substance Use Topics     Alcohol use: Yes     Comment: 0-1 per day     Drug use: No       Health Maintenance Due   Topic Date Due     ZOSTER IMMUNIZATION (2 of 3) 01/04/2016     MAMMO SCREEN Q2 YR (SYSTEM ASSIGNED)  11/09/2018       No results found for: PAP      January 14, 2019 2:54 PM     Stable

## 2022-11-21 ENCOUNTER — HEALTH MAINTENANCE LETTER (OUTPATIENT)
Age: 72
End: 2022-11-21

## 2022-12-02 DIAGNOSIS — M15.8 OTHER OSTEOARTHRITIS INVOLVING MULTIPLE JOINTS: ICD-10-CM

## 2022-12-02 RX ORDER — CELECOXIB 200 MG/1
200 CAPSULE ORAL DAILY
Qty: 90 CAPSULE | Refills: 0 | Status: SHIPPED | OUTPATIENT
Start: 2022-12-02 | End: 2023-03-03

## 2022-12-02 NOTE — CONFIDENTIAL NOTE
Celecoxib (Celebrex) 200 mg    Last Office Visit: 4/19/22  [place] [facility] [place]  Medication last refilled: 6/7/22 #90 with 1 refill(s)    Vital Signs 12/14/2020 12/14/2021 4/19/2022   Systolic 121 135 142   Diastolic 75 84 88     Required labs per protocol:    LAB REF RANGE 12/14/21 4/25/22   Creatinine 0.8-1.25 mg/dL 0.64 0.61   ALT 0-70 U/L 56 High --   AST 0-45 U/L 24 21   HCT 40.0 - 53.0 % 40.6 --   HGB 13.3 - 17.7 g/dL 13.2 --   Platelets 15.0 - 450.0 d/uL 220 --     Prescription approved per [facility] Refill Protocol.    Ethel Barnes, ANTONION, RN, CCM

## 2022-12-20 ENCOUNTER — TRANSFERRED RECORDS (OUTPATIENT)
Dept: HEALTH INFORMATION MANAGEMENT | Facility: CLINIC | Age: 72
End: 2022-12-20

## 2023-01-02 DIAGNOSIS — M85.88 OSTEOPENIA OF LUMBAR SPINE: ICD-10-CM

## 2023-01-02 RX ORDER — ALENDRONATE SODIUM 70 MG/1
70 TABLET ORAL
Qty: 12 TABLET | Refills: 0 | Status: SHIPPED | OUTPATIENT
Start: 2023-01-02 | End: 2023-03-21

## 2023-01-02 NOTE — CONFIDENTIAL NOTE
Alendronate (Fosamax) 70 mg    Last Office Visit: 4/19/22  Future [facility] Appointments: 4/24/23  Medication last refilled: 12/14/21 #12 with  Refill(s)    Required labs per protocol:    LAB REF RANGE 12/14/21 4/25/22   Creatinine 0.8-1.25 mg/dL 0.64 0.61     Last Dexa Scan:  5/4/22    Prescription approved per [facility] Refill Protocol.    Ethel Barnes, ANTONION, RN, CCM

## 2023-01-09 ENCOUNTER — TRANSFERRED RECORDS (OUTPATIENT)
Dept: HEALTH INFORMATION MANAGEMENT | Facility: CLINIC | Age: 73
End: 2023-01-09

## 2023-01-30 DIAGNOSIS — E78.5 HYPERLIPIDEMIA LDL GOAL <130: ICD-10-CM

## 2023-01-30 RX ORDER — ATORVASTATIN CALCIUM 10 MG/1
10 TABLET, FILM COATED ORAL DAILY
Qty: 90 TABLET | Refills: 0 | Status: SHIPPED | OUTPATIENT
Start: 2023-01-30 | End: 2023-04-22

## 2023-01-30 NOTE — TELEPHONE ENCOUNTER
Atorvastatin (Lipitor) 10 mg    Last Office Visit: 4/19/22  Future [facility] [place] Appointments: 4/24/23  Medication last refilled: 12/14/21 #90 with 3 refill(s)    Required labs per protocol:    LAB REF RANGE 12/14/21 4/25/22   LDL < 100 mg/dL 92 78     Prescription approved per [facility] Refill Protocol.    ANTONIO KasperN, RN, CCM

## 2023-02-28 DIAGNOSIS — M15.8 OTHER OSTEOARTHRITIS INVOLVING MULTIPLE JOINTS: ICD-10-CM

## 2023-03-01 NOTE — CONFIDENTIAL NOTE
Medication requested: celecoxib (CELEBREX) 200 MG capsule  Last office visit: 4/19/2022  [facility] appointments:  4/24/2023  Medication last refilled: 12/2/2022; 90 + 0 refills  Last qualifying labs:     BP Readings from Last 3 Encounters:   04/19/22 (!) 142/88   12/14/21 135/84   12/14/20 121/75     Component      Latest Ref Rng & Units 4/25/2022   AST (External)      no - no U/L 21     Component      Latest Ref Rng & Units 12/14/2021   ALT      0 - 50 U/L 56 (H)     Component      Latest Ref Rng & Units 4/25/2022   Creatinine (External)      no - no mg/dL 0.61     Component      Latest Ref Rng & Units 12/14/2021   WBC      4.0 - 11.0 10e3/uL 6.2   RBC Count      3.80 - 5.20 10e6/uL 3.95   Hemoglobin      11.7 - 15.7 g/dL 13.2   Hematocrit      35.0 - 47.0 % 40.6   MCV      78 - 100 fL 103 (H)   MCH      26.5 - 33.0 pg 33.4 (H)   MCHC      31.5 - 36.5 g/dL 32.5   RDW      10.0 - 15.0 % 14.2   Platelet Count      150 - 450 10e3/uL 220     Routing refill request to provider for review/approval because:  Labs out of range:  ALT, MCV, MCH    JAKE Hyde, RN  03/01/23, 11:16 AM

## 2023-03-03 RX ORDER — CELECOXIB 200 MG/1
200 CAPSULE ORAL DAILY
Qty: 90 CAPSULE | Refills: 0 | Status: SHIPPED | OUTPATIENT
Start: 2023-03-03 | End: 2023-04-22

## 2023-03-20 DIAGNOSIS — M85.88 OSTEOPENIA OF LUMBAR SPINE: ICD-10-CM

## 2023-03-21 RX ORDER — ALENDRONATE SODIUM 70 MG/1
70 TABLET ORAL
Qty: 3 TABLET | Refills: 1 | Status: SHIPPED | OUTPATIENT
Start: 2023-03-21 | End: 2023-04-22

## 2023-03-21 NOTE — TELEPHONE ENCOUNTER
Medication requested: alendronate (FOSAMAX) 70 MG tablet  Last office visit: 4/19/22  [facility] appointments: 4/24/23  Medication last refilled: 1/2/23; 12 + 0 refills  Last qualifying labs:     Last creatinine 0.61, 4/25/22 - external result from [facility]    Last DEXA 5/4/22    Medication is being filled for 1 time refill only due to:  Patient needs labs updated creatinine.    Pt scheduled for physical on 4/24/23    Kumar JOSEPH, RN  03/21/23 9:54 AM

## 2023-04-10 ENCOUNTER — TRANSFERRED RECORDS (OUTPATIENT)
Dept: HEALTH INFORMATION MANAGEMENT | Facility: CLINIC | Age: 73
End: 2023-04-10
Payer: MEDICARE

## 2023-04-17 ASSESSMENT — ENCOUNTER SYMPTOMS
HEADACHES: 1
MYALGIAS: 1
PARESTHESIAS: 0
DIZZINESS: 0
CONSTIPATION: 0
HEMATURIA: 0
FEVER: 0
EYE PAIN: 0
COUGH: 0
SHORTNESS OF BREATH: 0
JOINT SWELLING: 0
HEARTBURN: 0
PALPITATIONS: 0
NERVOUS/ANXIOUS: 0
SORE THROAT: 0
ARTHRALGIAS: 1
WEAKNESS: 0
DYSURIA: 0
BREAST MASS: 0
CHILLS: 0
DIARRHEA: 0
HEMATOCHEZIA: 0
FREQUENCY: 0
NAUSEA: 0
ABDOMINAL PAIN: 0

## 2023-04-17 ASSESSMENT — ACTIVITIES OF DAILY LIVING (ADL): CURRENT_FUNCTION: NO ASSISTANCE NEEDED

## 2023-04-19 ENCOUNTER — ANCILLARY ORDERS (OUTPATIENT)
Dept: FAMILY MEDICINE | Facility: CLINIC | Age: 73
End: 2023-04-19

## 2023-04-19 DIAGNOSIS — Z12.31 VISIT FOR SCREENING MAMMOGRAM: ICD-10-CM

## 2023-04-23 NOTE — PROGRESS NOTES
Yanely Jon is a 73 yo woman with hx of thyroid nodules, varicose veins, rosacea, seasonal allergies, osteopenia. She is here for a preventive exam She is here for a preventive exam.  She is fasting. She is up to date on eye exams and dental visits.    HCM  Advanced Directive: on file  COVID vaccine series: Bivalent #1 up to date, recommended getting Bivalent #2 at a pharmacy  Other vaccinations up to date  Mammogram: scheduled for May 2023  Colonoscopy: due 10/2024  DEXA completed 5/2022, on Fosamax, bone density was improving.     Hearing concerns: Some concerns.  Fall Risk: no.  Independent at home: yes.  Safe : yes  Memory concerns: no.    COGNITIVE SCREEN  1) Repeat 3 items (Village, Kitchen, baby)    2) Clock draw: NORMAL  3) 3 item recall: Recalls 3 objects  Results: 3 items recalled: COGNITIVE IMPAIRMENT LESS LIKELY    Mini-CogTM Copyright ALLISON Chacko. Licensed by the author for use in [facility]; reprinted with permission (emely@.[facility]). All rights reserved.      Diet: Widely varied.  Wt Readings from Last 4 Encounters:   04/24/23 78 kg (172 lb)   04/19/22 71.2 kg (157 lb)   12/14/21 69 kg (152 lb 1.3 oz)   12/14/20 80.2 kg (176 lb 12 oz)     Body mass index is 27.76 kg/m .    Elevated BP Reading  Ashtyn's initial BP in clinic was elevated at 163/89 today. She feels this may be partially due to nerves about the Mini-Cog today. She also notes that her exercise has been limited due to a pinched nerve in her back. BP recheck 148/98 further into the visit.    BP Readings from Last 3 Encounters:   04/24/23 (!) 163/89   04/19/22 (!) 142/88   12/14/21 135/84      Left Hip Pain  Ashytn follows with an orthopedic provider at [facility].  Walking has been limited due to left hip pain, since her right TKA in 2021. She initially thought this was due to overcompensating for her healing right leg. She got a cortisone injection for left hip bursitis without improvement. She received an MRI which found a  "pinched nerve in her back. She is scheduled for a cortisone injection to her back tomorrow. She takes Celebrex 200 mg daily for pain relief. Denies stomach upset. Of note, she states that her left knee \"isn't great either\" and she gets cortisone injections for it.    Hyperlipidemia  Ashtyn takes atorvastatin 10 mg daily for treatment of hyperlipidemia. No chest pain or palpitations.     Recent Labs   Lab Test 04/25/22  1223 12/14/21  1525 12/14/20  1356 02/10/20  1431   CHOL  --  183 193.0 174.0   HDL  --  84 80.0 68.0   LDL  --  92 104.0 95.0   TRIG 46 33 45.0 57.0   CHOLHDLRATIO  --   --  2.4 2.6     Rash  Ashtyn reports a 1 week history of a rash on her left lateral calf, over some spider veins.  She states that it \"itches like crazy.\" She does not recall being in a location where she would have gotten a bug bite.    Leg Cramps, Syncope  Ashtyn reports a longstanding history of leg cramps in both legs, but not usually at the same time. She states that she is starting to get cramps in the front and sides of her calves, which had not occurred in the past. She drinks a lot of water and takes a calcium 400 mg supplement daily. She also drinks almond milk 1 cup/day. Recently, she had an episode where both of her legs cramped at the same time, and she fell to the ground,  \"completely passing out.\" She feels the syncope was likely due to the intense pain.        PMH, PSH, FH, medications, allergies and immunizations are reviewed/ updated this visit.    Social  , 3 children, 12 grandchildren     HABITS:  Tob: never  ETOH: rare  Calcium: 1 supplement Ca 400 mg/D  4000 international unit(s),  daily + 1 serving dairy per day  Caffeine  0-1 per day  Exercise:  walking  2 miles a day     OB/GYN HISTORY:  LMP: postmenopausal  G 3   P 3   A 0  Self Breast exam:  Yes, no concerns    Current Outpatient Medications   Medication Sig Dispense Refill     alendronate (FOSAMAX) 70 MG tablet Take 1 tablet (70 mg) by mouth every 7 " days 3 tablet 1     amoxicillin (AMOXIL) 500 MG capsule Take 4 capsules 60 min prior to dental visit 4 capsule 1     atorvastatin (LIPITOR) 10 MG tablet Take 1 tablet (10 mg) by mouth daily 90 tablet 0     calcium carbonate 600 mg-vitamin D 400 units (CALTRATE) 600-400 MG-UNIT per tablet Take 1 tablet by mouth 2 times daily 180 tablet 3     celecoxib (CELEBREX) 200 MG capsule Take 1 capsule (200 mg) by mouth daily 90 capsule 0     cetirizine (ZYRTEC) 10 MG tablet Take 1 tablet (10 mg) by mouth daily 90 tablet 3     fluticasone (FLONASE) 50 MCG/ACT nasal spray 2 sprays       metroNIDAZOLE (METROGEL) 1 % gel        Omega-3 Fatty Acids (FISH OIL PO)        tretinoin (RETIN-A) 0.1 % cream Apply QHS to face for rosacea       VITAMIN D PO Take 4,000 Units by mouth daily       Allergies   Allergen Reactions     Cheese      Fentanyl Other (See Comments)     May have caused pt. To pass out after cataract      Latex Dermatitis     Methohexital Other (See Comments)     May have caused pt. To pass out after cataract      Midazolam Other (See Comments)     May have caused pt. To pass out after cataract surgery     Seasonal Allergies          ROS  CONSTITUTIONAL:NEGATIVE for fever, chills, change in weight  INTEGUMENTARY/SKIN: NEGATIVE for worrisome rashes, moles or lesions  EYES: NEGATIVE for vision changes or irritation  ENT/MOUTH: NEGATIVE for ear, mouth and throat problems  RESP:NEGATIVE for significant cough or SOB  BREAST: NEGATIVE for masses, tenderness or discharge  CV: NEGATIVE for chest pain, palpitations, CERVANTES, orthopnea, PND  or peripheral edema  GI: NEGATIVE for nausea, abdominal pain, heartburn, or change in bowel habits  :NEGATIVE for frequency, dysuria, or hematuria  MUSCULOSKELETAL:++ arthralgias left hip, myalgia over left gluteal muscles  NEURO: NEGATIVE for weakness, dizziness or paresthesias + headache  ENDOCRINE: NEGATIVE for polyuria/dipsia,  temperature intolerance, skin/hair changes  HEME/ALLERGY/IMMUNE:  "NEGATIVE for bleeding problems  PSYCHIATRIC: NEGATIVE for changes in mood or affect    EXAM  BP (!) 148/98   Pulse 101   Temp 97.3  F (36.3  C) (Skin)   Resp 15   Ht 1.676 m (5' 6\")   Wt 78 kg (172 lb)   SpO2 97%   BMI 27.76 kg/m      GENERAL APPEARANCE: Alert, pleasant, NAD  EYES: PERRL, EOMI, conjunctiva clear  HENT: TM normal bilaterally. Nose and mouth without lesions  NECK: no adenopathy, thyroid normal to palpation  RESP: lungs clear to auscultation bilaterally  CV: regular rate and rhythm, normal S1 S2, no murmur, no carotid bruits  ABDOMEN: soft, nontender, without HSM or masses. Bowel sounds normal  MS: extremities normal- no gross deformities noted, no hot or swollen joints.  Point tenderness over the lateral left hip and the left gluteal muscles.    SKIN: spider veins over lower extremities, with some mild erythema and subjective pruritus  NEURO: Normal strength and tone, sensory exam grossly normal, DTR normoreflexive in upper and lower extremities  PSYCH: mentation appears normal. and affect normal/bright.  EXT: no peripheral edema, pedal pulses palpable    Assessment:  (Z00.00) Medicare annual wellness visit, subsequent  (primary encounter diagnosis)  Comment: Healthy 72 year old woman.  Plan: Today we discussed ways to maintain a healthy lifestyle with sensible eating, regular exercise and self cares. We dicussed calcium and Vitamin D intake, vaccinations and preventive health screens.  May go to pharmacy for second Bivalent COVID booster    (M85.88) Osteopenia of lumbar spine  Comment: 5 year anniversary in 2/2025.  Plan: alendronate (FOSAMAX) 70 MG tablet        Continue medicine. Anniversary is 2025. Discussed calcium and Vit D intake    (E78.5) Hyperlipidemia LDL goal <130  Comment: Fasting.  Plan: atorvastatin (LIPITOR) 10 MG tablet, Lipid         Profile        Medication refilled.    (M15.8) Other osteoarthritis involving multiple joints  Comment: Low back pain with radiation to the left " "hip. Will be getting injection through [facility].   Plan: celecoxib (CELEBREX) 200 MG capsule, Basic         metabolic panel        Medication refilled.     (I83.11,  I83.12) Varicose veins of both lower extremities with inflammation  Comment: Spider veins at the lower extremities. Somewhat inflamed.   Plan: Vascular Surgery Referral - [facility]        Recommended applying OTC hydrocortisone ointment and cold packs for relief of itching. Referral given.     (R03.0) Elevated blood pressure reading  Comment: BP elevated today.   BP Readings from Last 3 Encounters:   04/24/23 (!) 163/89   04/19/22 (!) 142/88   12/14/21 135/84      Plan: Monitor BP at home. Bring BP machine in to clinic for accuracy check.     May 5, 2023--report of home BP readings  4/25 185/92  4/26 137/83  5/2 135/80  5/3 141/80  5/4 128/94    Recommend bringing BP monitor to clinic to compare readings.     Sherlyn Almanzar MD  Internal Medicine/Pediatrics      I, Mary Jo Barreto, am serving as a scribe to document services personally performed by Dr. Sherlyn Almanzar, based on data collection and the provider's statements to me. Dr. Almanzar has reviewed, edited, and approved the above note.       Answers for HPI/ROS submitted by the patient on 4/17/2023  In general, how would you rate your overall physical health?: fair  Frequency of exercise:: 4-5 days/week  Do you usually eat at least 4 servings of fruit and vegetables a day, include whole grains & fiber, and avoid regularly eating high fat or \"junk\" foods? : Yes  Taking medications regularly:: Yes  Medication side effects:: None  Activities of Daily Living: no assistance needed  Home safety: no safety concerns identified  Hearing Impairment:: feel that people are mumbling or not speaking clearly  In the past 6 months, have you been bothered by leaking of urine?: Yes  abdominal pain: No  Blood in stool: No  Blood in urine: No  chest pain: No  chills: No  congestion: No  constipation: No  cough: No  diarrhea: " No  dizziness: No  ear pain: No  eye pain: No  nervous/anxious: No  fever: No  frequency: No  genital sores: No  headaches: Yes  hearing loss: No  heartburn: No  arthralgias: Yes  joint swelling: No  peripheral edema: No  mood changes: No  myalgias: Yes  nausea: No  dysuria: No  palpitations: No  Skin sensation changes: No  sore throat: No  urgency: No  rash: No  shortness of breath: No  visual disturbance: No  weakness: No  pelvic pain: No  vaginal bleeding: No  vaginal discharge: No  tenderness: No  breast mass: No  breast discharge: No  In general, how would you rate your overall mental or emotional health?: good  Additional concerns today:: Yes  Duration of exercise:: 30-45 minutes

## 2023-04-24 ENCOUNTER — OFFICE VISIT (OUTPATIENT)
Dept: FAMILY MEDICINE | Facility: CLINIC | Age: 73
End: 2023-04-24
Payer: MEDICARE

## 2023-04-24 VITALS
DIASTOLIC BLOOD PRESSURE: 98 MMHG | WEIGHT: 172 LBS | HEIGHT: 66 IN | TEMPERATURE: 97.3 F | SYSTOLIC BLOOD PRESSURE: 148 MMHG | OXYGEN SATURATION: 97 % | HEART RATE: 101 BPM | BODY MASS INDEX: 27.64 KG/M2 | RESPIRATION RATE: 15 BRPM

## 2023-04-24 DIAGNOSIS — M85.88 OSTEOPENIA OF LUMBAR SPINE: ICD-10-CM

## 2023-04-24 DIAGNOSIS — I83.11 VARICOSE VEINS OF BOTH LOWER EXTREMITIES WITH INFLAMMATION: ICD-10-CM

## 2023-04-24 DIAGNOSIS — M15.8 OTHER OSTEOARTHRITIS INVOLVING MULTIPLE JOINTS: ICD-10-CM

## 2023-04-24 DIAGNOSIS — Z00.00 MEDICARE ANNUAL WELLNESS VISIT, SUBSEQUENT: Primary | ICD-10-CM

## 2023-04-24 DIAGNOSIS — R03.0 ELEVATED BLOOD PRESSURE READING: ICD-10-CM

## 2023-04-24 DIAGNOSIS — E78.5 HYPERLIPIDEMIA LDL GOAL <130: ICD-10-CM

## 2023-04-24 DIAGNOSIS — I83.12 VARICOSE VEINS OF BOTH LOWER EXTREMITIES WITH INFLAMMATION: ICD-10-CM

## 2023-04-24 LAB
ANION GAP SERPL CALCULATED.3IONS-SCNC: 14 MMOL/L (ref 7–15)
BUN SERPL-MCNC: 15.5 MG/DL (ref 8–23)
CALCIUM SERPL-MCNC: 9.9 MG/DL (ref 8.8–10.2)
CHLORIDE SERPL-SCNC: 104 MMOL/L (ref 98–107)
CHOLEST SERPL-MCNC: 181 MG/DL
CREAT SERPL-MCNC: 0.6 MG/DL (ref 0.51–0.95)
DEPRECATED HCO3 PLAS-SCNC: 25 MMOL/L (ref 22–29)
GFR SERPL CREATININE-BSD FRML MDRD: >90 ML/MIN/1.73M2
GLUCOSE SERPL-MCNC: 103 MG/DL (ref 70–99)
HDLC SERPL-MCNC: 68 MG/DL
LDLC SERPL CALC-MCNC: 102 MG/DL
NONHDLC SERPL-MCNC: 113 MG/DL
POTASSIUM SERPL-SCNC: 4.2 MMOL/L (ref 3.4–5.3)
SODIUM SERPL-SCNC: 143 MMOL/L (ref 136–145)
TRIGL SERPL-MCNC: 55 MG/DL

## 2023-04-24 PROCEDURE — 80048 BASIC METABOLIC PNL TOTAL CA: CPT | Mod: ORL | Performed by: INTERNAL MEDICINE

## 2023-04-24 PROCEDURE — 80061 LIPID PANEL: CPT | Mod: ORL | Performed by: INTERNAL MEDICINE

## 2023-04-24 RX ORDER — ALENDRONATE SODIUM 70 MG/1
70 TABLET ORAL
Qty: 12 TABLET | Refills: 3 | Status: SHIPPED | OUTPATIENT
Start: 2023-04-24 | End: 2024-04-26

## 2023-04-24 RX ORDER — FLUTICASONE PROPIONATE 50 MCG
2 SPRAY, SUSPENSION (ML) NASAL
COMMUNITY
Start: 2020-02-06 | End: 2024-05-15

## 2023-04-24 RX ORDER — ATORVASTATIN CALCIUM 10 MG/1
10 TABLET, FILM COATED ORAL DAILY
Qty: 90 TABLET | Refills: 3 | Status: SHIPPED | OUTPATIENT
Start: 2023-04-24 | End: 2024-04-26

## 2023-04-24 RX ORDER — CELECOXIB 200 MG/1
200 CAPSULE ORAL DAILY
Qty: 90 CAPSULE | Refills: 3 | Status: SHIPPED | OUTPATIENT
Start: 2023-04-24 | End: 2024-04-26

## 2023-04-24 NOTE — NURSING NOTE
"72 year old  Chief Complaint   Patient presents with     Physical     Physical, rash on left calf, and leg cramping in both legs.        Blood pressure (!) 163/89, pulse 101, temperature 97.3  F (36.3  C), temperature source Skin, resp. rate 15, height 1.676 m (5' 6\"), weight 78 kg (172 lb), SpO2 97 %. Body mass index is 27.76 kg/m .  Patient Active Problem List   Diagnosis     Cervicalgia     Thyroid nodule     Primary osteoarthritis of knee     Seasonal allergic rhinitis     Rosacea     Symptomatic varicose veins of both lower extremities     Osteopenia of spine     Abnormal fasting glucose     Low bone density     Personal history of irradiation, presenting hazards to health     Cervical adenopathy     Postmenopausal status       Wt Readings from Last 2 Encounters:   04/24/23 78 kg (172 lb)   04/19/22 71.2 kg (157 lb)     BP Readings from Last 3 Encounters:   04/24/23 (!) 163/89   04/19/22 (!) 142/88   12/14/21 135/84         Current Outpatient Medications   Medication     alendronate (FOSAMAX) 70 MG tablet     amoxicillin (AMOXIL) 500 MG capsule     atorvastatin (LIPITOR) 10 MG tablet     calcium carbonate 600 mg-vitamin D 400 units (CALTRATE) 600-400 MG-UNIT per tablet     celecoxib (CELEBREX) 200 MG capsule     cetirizine (ZYRTEC) 10 MG tablet     fluticasone (FLONASE) 50 MCG/ACT nasal spray     metroNIDAZOLE (METROGEL) 1 % gel     Omega-3 Fatty Acids (FISH OIL PO)     tretinoin (RETIN-A) 0.1 % cream     VITAMIN D PO     No current facility-administered medications for this visit.       Social History     Tobacco Use     Smoking status: Never     Smokeless tobacco: Never   Vaping Use     Vaping status: Never Used   Substance Use Topics     Alcohol use: Yes     Comment: 0-1 per day     Drug use: No       Health Maintenance Due   Topic Date Due     ADVANCE CARE PLANNING  11/13/2022     FALL RISK ASSESSMENT  04/19/2023     MEDICARE ANNUAL WELLNESS VISIT  04/19/2023       No results found for: PAP      April 24, " 2023 12:53 PM

## 2023-04-24 NOTE — PATIENT INSTRUCTIONS
Get COVID bivalent #2 booster at [facility]    1%hydrocortisone ointment to red rash    Leg cramping  Stay well hydrated  Magnesium 250mg once or twice daily    Calcium 1200mg per day  Vitamin D 1000 international unit(s) daily    Pelvic floor muscles  Kegels for bladder control

## 2023-04-26 ASSESSMENT — ENCOUNTER SYMPTOMS
ARTHRALGIAS: 1
MUSCLE WEAKNESS: 1
MUSCLE CRAMPS: 1
MYALGIAS: 1
BACK PAIN: 1
NECK PAIN: 1

## 2023-05-03 ENCOUNTER — OFFICE VISIT (OUTPATIENT)
Dept: ENDOCRINOLOGY | Facility: CLINIC | Age: 73
End: 2023-05-03
Payer: MEDICARE

## 2023-05-03 VITALS
DIASTOLIC BLOOD PRESSURE: 80 MMHG | HEART RATE: 112 BPM | BODY MASS INDEX: 27.92 KG/M2 | WEIGHT: 173 LBS | SYSTOLIC BLOOD PRESSURE: 141 MMHG

## 2023-05-03 DIAGNOSIS — R25.2 MUSCLE CRAMPS: ICD-10-CM

## 2023-05-03 DIAGNOSIS — R59.0 CERVICAL ADENOPATHY: ICD-10-CM

## 2023-05-03 DIAGNOSIS — Z78.0 POSTMENOPAUSAL STATUS: ICD-10-CM

## 2023-05-03 DIAGNOSIS — Z79.52 ON CORTICOSTEROID THERAPY: ICD-10-CM

## 2023-05-03 DIAGNOSIS — M85.9 LOW BONE DENSITY: Primary | ICD-10-CM

## 2023-05-03 DIAGNOSIS — E04.1 THYROID NODULE: ICD-10-CM

## 2023-05-03 PROCEDURE — 99214 OFFICE O/P EST MOD 30 MIN: CPT

## 2023-05-03 ASSESSMENT — PAIN SCALES - GENERAL: PAINLEVEL: SEVERE PAIN (6)

## 2023-05-03 NOTE — PROGRESS NOTES
Endocrinology note    Attending Assessment/Plan :     Multiple thyroid nodules, all subcm with perithyroidal adenopathy She has history of radiation exposure as a child which increases her risk  Repeat US 11/2023 as previously recommended .  Order placed.     11/21/23  thyroid US compared with 11/22/2021,  9/10/2020,  12/19  Right # 1 0.8 x 0.4 x 1.1 (0.18 CM3)anterior isthmus was right # 2 0.6 x 0.4 x 0.9  (0.11 CM3);  0.7 x 0.5 x 0.8 (0.14 CM3; 2019)  Left # 2  0.8 x 0.6 x 1.2 cm (0.29 CM3) solid hypoechoic anterior was left # 4 0.7 x 0.6 x 0.9 cm mixed (0.19 CM3);   0.7 x 0.7 x 0.7 hypoechoic;  0.7 x 0.6 x 0.7 cm (0.15 CM3) )  Left # 3 0.8 x 0.4 x 0.8 was ?  left # 5  0.6 x 0.3 x 0.6   Right superior posterior not seen ; was  1 x 0.6 x 1.1 was  1 x 0.6 x 1.1 cm anechoic (was 0.6 x 0.9 x 1.1 cm)  Right level 4 # 1 0.4 x 0.7 x 1 was  0.4 x 0.5 x was  0.9 x 0.5 x 0.7 cm FNAB 11/23/2-2- benign    Right level 7 # 1  0.7 x 0.5 x 0.7 cm was 1.0 x 0.6 x 1.1 cm;  1.3 x 0.7 x 1.1 cm  Left level 3 0.9 x 0.5 x 1.5 cm was 0.9 x 0.4 x 1.7 cm;    0.9 x 0.5 x 2 cm ; 0.8 x 0.4 x 1.7 cm)- FNAB 11/23/20- benign   Left level 7 # 1  1.2 x 0.5 x 0.8 cm was  1.2 x 0.4 x 1.2; 1.1 x 0.6 x 0.9 cm    perithyroidal adenopathy- benign cytology on bilateral lateral neck LNs 2020.  Stable on serial  US 11/2021  Plan as noted above.     Low bone density on 12/19 DXA. Family history of hip fracture in her mother.      On alendronate since 2/2020.  Plan to give alendronate until 2025 and then take drug holiday  Next DXA should be 5/4/2024 or after . I have reminded her of this and placed order.  OK if Dr Almanzar is managing it    On corticosteroid injections for her back - this is a risk for the bone - as above.      Post menopausal    History of radiation exposure presenting health risks - [employer] fluroscope exposure as a child approx age 4    Muscle cramps discussed.  Labs to include TFTS and vitamin D    Chart review/prep time 1  [id number];    35_ minutes spent on the date of the encounter doing chart review, history and exam, documentation and further activities as noted above.    Olga Murry MD    Chief complaint: HISTORY OF PRESENT ILLNESS  Ashtyn presents today , along with , for follow up of multiple thyroid nodules, perithyroidal adenopathy and low bone density. I last saw her 12/8/2021.  At that time I recommended she see me in 2 years with US just prior .  Because Epic doesn't  Permit orders over one year in advance, I couldn't write the order then . Nobody reminded us we needed the order.  The US has not been done.  Ashtyn doesn't recall this recommendation.      Thyroid nodule was lst seen on an MRI  Since then, she has had thyorid US x 3, on 12/2019, 9/10/2020 and 11/22/2021.   Ashtyn recalls exposure to the shoe store fluoroscope at [facility], when she was approximately 4 years of age. I have again reviewed/re-read the neck US from 11/22/2021.    She has been on alendronate since 2/2020. She is tolerating it.   The last DXA was 5/4/2022.  She is telling me that Dr Almanzar is managing this.  We have reviewed the results.     We have the following labs:   10/6/16 HgbA1c 5.6%  12/11/19 TSH 2.58, creatinine 0.7, Ca 9.4, glucose 106, cholesterol 262,   2/26/2020 calcitonin < 2, TPO < 10, HgbA1c 5.7%  12/14/2020 glucose 95, Ca 9, creatinine 0.59  4/24/23 Ca 9.9, creatinine 0.6, glucose 103    I have reviewed images on pACS  12/19/19 DXA : lowest T-score -2.4 L1-L2 spine.  There is a history of wrist fracture age 4.  There is a family history of hip fracture.  FRAX: 18/5.9% not including chlidhood fracture.  If I include that the major osteoporotic fracture risk is > 20%  Chart check :   11/22/2021 thyroid and neck US as read by me compared with 9/10/2020 12/19  Right superior posterior # 1 1 x 0.6 x 1.1 was  1 x 0.6 x 1.1 cm anechoic (was 0.6 x 0.9 x 1.1 cm)  Right anterior # 2 0.6 x 0.4 x 0.9 was 0.6 x 0.4 x 0.8 cm (was 0.7 x 0.5 x 0.8  cm )- ? microcalc  Right inferior # 3  0.4  X 0.3 x 0.4 cm  was 0.6 x 0.4 x 0.5 cm   Left anterior # 4 0.7 x 0.6 x 0.8 cm appears mixed    was  0.7 x 0.7 x 0.7 hypoechoic  (was 0.7 x 0.6 x 0.7 cm )  Left # 2 0.6 x 0.3 x 0.6 cm was 0.5 x 0.3 x 0.7 cm hypoechoic (was 0.5 x 0.3 x 0.6 cm)  Right level 4 # 1 0.4 x 0.5 x was  0.9 x 0.5 x 0.7 cm FNAB 11/23/2-2- benign    Right level 7 # 1 1.0 x 0.6 x 1.1 cm was 1.3 x 0.7 x 1.1 cm  Left level 3 0.9 x 0.4 x 1.7 cm  was   0.9 x 0.5 x 2 cm (was 0.8 x 0.4 x 1.7 cm)- FNAB 11/23/20- benign   Left level 7 # 1  1.2 x 0.4 x 1.2 was 1.1 x 0.6 x 0.9 cm      REVIEW OF SYSTEMS  Height not measured today -  Weight was measured today.   No problems  Swallow ok  Voice OK  Singing voice has gone to pot  Just had shot in the back at [employer] -  Passed out from muscle cramp pain     Answers for HPI/ROS submitted by the patient on 4/26/2023  General Symptoms: No  Skin Symptoms: No  HENT Symptoms: No  EYE SYMPTOMS: No  HEART SYMPTOMS: No  LUNG SYMPTOMS: No  INTESTINAL SYMPTOMS: No  URINARY SYMPTOMS: No  GYNECOLOGIC SYMPTOMS: No  BREAST SYMPTOMS: No  SKELETAL SYMPTOMS: Yes  BLOOD SYMPTOMS: No  NERVOUS SYSTEM SYMPTOMS: No  MENTAL HEALTH SYMPTOMS: No  Back pain: Yes  Muscle aches: Yes  Neck pain: Yes  Joint pain: Yes  Muscle cramps: Yes  Muscle weakness: Yes      Past Medical History  Past Medical History:   Diagnosis Date    Acrochordon     Atypical chest pain 1999    Normal stress test in 1999, performed for atypical chest pain    Benign nevus of skin     Numerous benign skin nevi    Biceps tendonitis     Bilateral bunions     Breast implant leak     Breast implants complicated by a leak. She then underwent replacement and repair.     Chronic fatigue     H/O urinary incontinence     Mild occasional female urinary incontinence which seems to be both urge and stress related.    History of bilateral cataract extraction     Hx of tubal ligation     Left hip pain     Occasional left hip pain due to  osteoarthritis and/or trochanteric bursitis.    Lipoma of back 2014    Chronic lipoma of the left back, status post-surgical resection in January 2014    Low back pain     Occasional low back pain with radicular symptoms into the legs.    Osteoarthritis of knees, bilateral     Osteoarthritis of left hip     Osteopenia 09/13/2013    Postmenopausal status     Prediabetes 02/2020    Pubic bone pain     Musculoskeletal pubic bone pain due to a strain.    Rosacea     Seborrheic keratoses     Skin lesions     Atypical melanocytic lesions, status post multiple biopsies by Dr. Rivera in July of 2015. These biposies showed a junctional nevus with moderate atypia, which is also a dysplastic nevus and the final one was also a compound nevus, with mild atypia which is also a dysplastic nevus.  She was contacted by Dr. Rivera and was told to follow up with her again in July 2016, for re-evaluation of her skin.    Skin tag     Solar elastosis     Solar lentiginosis     Sternal fracture 2015    Mid-sternal fracture after blunt trauma while falling while running up stepsin May 2015. She may have also had some rib fractures as well. These healed with conservative management.    Symptomatic varicose veins of both lower extremities 2015    Lower extremity venous varicosities, intermittently symptomatic, status post evaluation with Dr. Alonso in General Surgery in the summer of 2015.    Symptomatic varicose veins of both lower extremities     Status post injection and compression sclerotherapy    Syncope 2018    Urticaria     Questionable exercise-induced urticaria     Rosacea  Syncope 2018    Past Surgical History:   Procedure Laterality Date    APPENDECTOMY      BUNIONECTOMY Bilateral 10/2017    CATARACT IOL, RT/LT Right     COLONOSCOPY  2014    repeat in 10 yr    DENTAL SURGERY      MAMMOPLASTY AUGMENTATION Bilateral 1972    RHYTIDECTOMY (FACELIFT)  01/23/2014    neck    SCLEROTHERAPY  10/03/2014    varicose veins    SURGICAL  PATHOLOGY EXAM  01/13/2014    lower back    TONSILLECTOMY      TOTAL KNEE ARTHROPLASTY Right 12/2021    TUBAL LIGATION         Medications    Current Outpatient Medications   Medication Sig Dispense Refill    alendronate (FOSAMAX) 70 MG tablet Take 1 tablet (70 mg) by mouth every 7 days 12 tablet 3    amoxicillin (AMOXIL) 500 MG capsule Take 4 capsules 60 min prior to dental visit 4 capsule 1    atorvastatin (LIPITOR) 10 MG tablet Take 1 tablet (10 mg) by mouth daily 90 tablet 3    calcium carbonate 600 mg-vitamin D 400 units (CALTRATE) 600-400 MG-UNIT per tablet Take 1 tablet by mouth 2 times daily 180 tablet 3    celecoxib (CELEBREX) 200 MG capsule Take 1 capsule (200 mg) by mouth daily 90 capsule 3    cetirizine (ZYRTEC) 10 MG tablet Take 1 tablet (10 mg) by mouth daily 90 tablet 3    fluticasone (FLONASE) 50 MCG/ACT nasal spray 2 sprays      metroNIDAZOLE (METROGEL) 1 % gel       Omega-3 Fatty Acids (FISH OIL PO)       tretinoin (RETIN-A) 0.1 % cream Apply QHS to face for rosacea      VITAMIN D PO Take 4,000 Units by mouth daily       Fish oil  Mg ? sometimes    Allergies  Allergies   Allergen Reactions    Cheese     Fentanyl Other (See Comments)     May have caused pt. To pass out after cataract     Latex Dermatitis    Methohexital Other (See Comments)     May have caused pt. To pass out after cataract     Midazolam Other (See Comments)     May have caused pt. To pass out after cataract surgery    Seasonal Allergies      Family History  family history includes Bladder Cancer (age of onset: 67) in her father; Breast Cancer (age of onset: 53) in her sister; Cerebrovascular Disease in her maternal aunt; Cerebrovascular Disease (age of onset: 95) in her mother; Heart Disease (age of onset: 50) in her brother; Hip fracture in her mother; Hyperlipidemia in her maternal aunt and maternal aunt; Osteoarthritis in her mother; Osteoporosis in her mother.    Social History  Social History     Tobacco Use    Smoking status:  Never    Smokeless tobacco: Never   Vaping Use    Vaping status: Never Used   Substance Use Topics    Alcohol use: Yes     Comment: 0-1 per day    Drug use: No       Physical Exam  GENERAL no mask; pleasant; her voice is slightly raspy  BP (!) 141/80 (BP Location: Right arm, Patient Position: Sitting, Cuff Size: Adult Regular)   Pulse 112   Wt 78.5 kg (173 lb)   BMI 27.92 kg/m    SKIN: normal color, temperature, texture  HEENT: PER, no scleral icterus, eyelid retraction, stare, lid lag, proptosis or conjunctival injection.  .    NECK: supple.  No visible or palpable neck masses, cervical adenopathy, or goiter.   LUNGS: clear to auscultation bilaterally.   CARDIAC: RRR, S1, S2 without murmurs, rubs or gallops.    BACK: normal spinal contour.    NEURO: Alert, responds appropriately to questions,  moves all extremities, DTRs 0/4, gait normal, no tremor of the outstretched hand    DATA REVIEW     Latest Reference Range & Units 04/24/23 13:43   Sodium 136 - 145 mmol/L 143   Potassium 3.4 - 5.3 mmol/L 4.2   Chloride 98 - 107 mmol/L 104   Carbon Dioxide (CO2) 22 - 29 mmol/L 25   Urea Nitrogen 8.0 - 23.0 mg/dL 15.5   Creatinine 0.51 - 0.95 mg/dL 0.60   GFR Estimate >60 mL/min/1.73m2 >90   Calcium 8.8 - 10.2 mg/dL 9.9   Anion Gap 7 - 15 mmol/L 14   Cholesterol <200 mg/dL 181   Glucose 70 - 99 mg/dL 103 (H)   HDL Cholesterol >=50 mg/dL 68   LDL Cholesterol Calculated <=100 mg/dL 102 (H)   Non HDL Cholesterol <130 mg/dL 113   Triglycerides <150 mg/dL 55   (H): Data is abnormally high       [address] [address]  Phone: [phone]   Fax: [phone]        Patient name:                          Yanely Jon  Patient demographics:            71 year old White Female   History:                                    Evaluation of Bone Density, Family History of Fracture, Family History of Osteoporosis, Post Menopausal and Thyroid Condition  Current  treatments:                 Calcium, Cholesterol Lowering Drugs, Estrogen, Fosamax/Alendronate, Vitamin D  Scan:                                       [employer]           Impression  The most negative and valid T-score of -2.0 at the level of the L1 - L2 lumbar spine corresponds with low bone density according to WHO criteria for postmenopausal females and men age 50 and over. The risk of osteoporotic fracture increases approximately 2-fold for each 1.0 SD decrease in T-score.     Results   Lumbar spine L1-L2  T-score -2.0 , BMD 0.922 g/cm2.      Left Femur neck  T-score -1.5 , BMD 0.823 g/cm2.  Left Total hip  T-score -1.4 , BMD 0.837 g/cm2.     Right Femur neck  T-score -1.7, BMD  0.806 g/cm2.  Right Total hip  T-score -1.3 , BMD  0.839 g/cm2.     Interval change  Bone density compared to the prior study has changed at L1-2 by +6.0%     Percent changes not mentioned or within remaining regions are insignificant  Please note that the differential diagnosis of BMD increase in the spine includes improvement due to pharmacotherapy vs inter-current progression of spine degeneration or fracture     Fracture risk   The risk of osteoporotic fracture increases approximately 2-fold for each 1.0 SD decrease in T-score.  Low bone density is not the only risk factor for fracture; consider factors such as patient's age, fall risk, injury risk, previous osteoporotic fracture, family history of osteoporosis, etc.       Repeat  For patients eligible for Medicare, routine testing is allowed once every 2 years.   Testing frequency can be increased for patients on corticosteroids.     Clinical correlation recommended      Technical quality  Satisfactory.   Abnormal vertebrae may be excluded from analysis if there is more than a 1.0 T-score difference between the vertebrae in question and adjacent vertebrae. Note the wide range in BMD values and T-scores within the lumbar spine. In the circumstances, the lumbar spine  is represented by L1-L2        Principal result :  Jocelyne Waite MD, CCD  Division of Endocrinology and Diabetes    Department of Medicine      US THYROID 11/21/2023 9:39 AM  COMPARISON: Ultrasound 11/20/2021, 9/10/2020, 12/19/2019  HISTORY: Low bone density; Thyroid nodule; Cervical adenopathy;  Postmenopausal status  FINDINGS:   Thyroid parenchyma: heterogenous  The right lobe of the thyroid size: 3.8 x 1.6 x 1.5 cm  The left lobe of the thyroid size: 3.7 x 1.9 x 1.5 cm  The thyroid isthmus measures: 0.3 cm  Nodule 1:  Lobe: Right  Location: Superior  Size: 1.1 x 0.8 x 0.4 cm  Composition: Solid or almost completely solid (2 points)  Echogenicity: Hyperechoic or isoechoic (1 point)  Shape: Wider than tall (0 points)  Margin: Ill-defined (0 points)  Echogenic Foci: None or large comet tail artifact (0 points)  Stability: Slight increase in size, previously 0.9 x 0.6 x 0.4 cm  (prior nodule #2)  TIRADS: TR3 (3 points)   Nodule 2:  Lobe: Left  Location: Inferior  Size: 1.2 x 0.8 x 0.6 cm  Composition: Solid or almost completely solid (2 points)  Echogenicity: Hypoechoic (2 points)  Shape: Wider than tall (0 points)  Margin: Ill-defined (0 points)  Echogenic Foci: None or large comet tail artifact (0 points)  Stability: Enlarging, previously 0.8 x 0.7 x 0.6 cm (prior nodule #4)  TIRADS: TR4 (4-6 points)   Nodule 3:  Lobe: Left  Location: Inferior  Size: 0.9 x 0.8 x 0.4 cm  Composition: Solid or almost completely solid (2 points)  Echogenicity: Hypoechoic (2 points)  Shape: Wider than tall (0 points)  Margin: Ill-defined (0 points)  Echogenic Foci: None or large comet tail artifact (0 points)  Stability: Enlarging, previously 0.6 x 0.6 x 0.3 cm (prior nodule #5)  TIRADS: TR4 (4-6 points)                                                  Impression: Multiple thyroid nodules as described including 1.2 cm TR 4 nodule#2  in the left lobe, recommend follow-up in one year  ACR TI-RADS recommendations  TR2  (2 points) & TR1 (0 points) -No FNA or follow-up  TR3 (3 points) - FNA if ? 2.5cm, follow-up if 1.5 -2.4 cm in 1, 3 and 5 years  TR4 (4-6 points) - FNA if ? 1.5cm, follow-up if 1 -1.4 cm in 1, 2, 3and 5 years  TR5 (?7 points) - FNA if ? 1cm, follow-up if 0.5 -0.9 cm every year for 5 years   I have personally reviewed the examination and initial interpretation and I agree with the findings.  KERMIT AC MD     EXAMINATION: US HEAD NECK SOFT TISSUE, 11/21/2023 9:34 AM   COMPARISON: 11/20/2021  HISTORY: Lobe low-density, thyroid nodule, cervical adenopathy,postmenopausal status  FINDINGS:   Lymph nodes are measured bilaterally with measurements given in transverse x AP x craniocaudal dimensions as follows:  Right:  Level 2: 1.3 x 0.5 x 1.2 cm oval shaped node with fatty hilum, likely reactive. Not significantly changed from prior ultrasound.  Level 3: No enlarged or suspicious lymph nodes.  Level 4: 0.4 x 0.7 x 1.0 cm oval shaped lymph node with indistinct fatty hilum fatty hilum, slightly enlarged compared to prior  ultrasound from 11/22/2021uncertain, if secondary to difference in measurement technique..  Level 5: No enlarged or suspicious lymph nodes.  Level 6: No enlarged or suspicious lymph nodes.  Level 7: 0.7 x 0.5 x 0.7 cm oval shaped node with fatty hilum slight decrease in size though uncertain if secondary to difference in measurement technique..  Left:  Level 2:   Lymph node #1: 1.0 x 0.7 x 1.5 cm reniform shaped lymph node with fatty hilum, not significantly changed, likely reactive  Lymph nodes# 2:  0.4 x 0.6 x 1.2 cm hypoechoic node with small/absent fatty hilum, not significantly changed.  Level 3: Unchanged 0.9 x 0.5 x 1.5 cm hypoechoic node with preserved fatty hilum, not significantly changed, likely reactive.  Level 4: No enlarged or suspicious lymph nodes.  Level 5: No enlarged or suspicious lymph nodes.  Level 6: No enlarged or suspicious lymph nodes.  Level 7: 1.2 x 0.5 x 0.8 cm reniform  node with preserved fatty hilum. Not significantly changed.                                                          IMPRESSION: Soft tissue neck ultrasound with lymph nodes measurement as described above.. No new suspicious lymphadenopathy.  I have personally reviewed the examination and initial interpretation and I agree with the findings.  KERMIT AC MD

## 2023-05-03 NOTE — PATIENT INSTRUCTIONS
Erroneously altered 5/15/2024. The prior version was correct but I don't know how to get it back.

## 2023-05-03 NOTE — NURSING NOTE
Chief Complaint   Patient presents with     Endocrine Problem     Nodules     Blood pressure (!) 141/80, pulse 112, weight 78.5 kg (173 lb).    Debi Linares

## 2023-05-03 NOTE — LETTER
5/3/2023       RE: Yanely Jon  [address]  [place] [address]     Dear Colleague,    Thank you for referring your patient, Yanely Jon, to the [facility] [place] at [facility]. Please see a copy of my visit note below.    Endocrinology note    Attending Assessment/Plan :     Multiple thyroid nodules, all subcm with perithyroidal adenopathy She has history of radiation exposure as a child which increases her risk  Repeat US 11/2023 as previously recommended .  Order placed.     perithyroidal adenopathy- benign cytology on bilateral lateral neck LNs 2020.  Stable on serial  US 11/2021  Plan as noted above.     Low bone density on 12/19 DXA. Family history of hip fracture in her mother.      On alendronate since 2/2020.  Plan to give alendronate until 2025 and then take drug holiday  Next DXA should be 5/4/2024 or after . I have reminded her of this and placed order.  OK if Dr Almanzar is managing it    On corticosteroid injections for her back - this is a risk for the bone - as above.      Post menopausal    History of radiation exposure presenting health risks - [employer] store fluroscope exposure as a child approx age 4    Muscle cramps discussed.  Labs to include TFTS and vitamin D    Chart review/prep time 1  [id number];   35_ minutes spent on the date of the encounter doing chart review, history and exam, documentation and further activities as noted above.    Olga Murry MD    Chief complaint: HISTORY OF PRESENT ILLNESS  Ashtyn presents today , along with , for follow up of multiple thyroid nodules, perithyroidal adenopathy and low bone density. I last saw her 12/8/2021.  At that time I recommended she see me in 2 years with US just prior .  Because Epic doesn't  Permit orders over one year in advance, I couldn't write the order then . Nobody reminded us we needed the order.  The US has not  been done.  Ashtyn doesn't recall this recommendation.      Thyroid nodule was lst seen on an MRI  Since then, she has had thyorid US x 3, on 12/2019, 9/10/2020 and 11/22/2021.   Ashtyn recalls exposure to the shoe store fluoroscope at [facility], when she was approximately 4 years of age. I have again reviewed/re-read the neck US from 11/22/2021.    She has been on alendronate since 2/2020. She is tolerating it.   The last DXA was 5/4/2022.  She is telling me that Dr Almanzar is managing this.  We have reviewed the results.     We have the following labs:   10/6/16 HgbA1c 5.6%  12/11/19 TSH 2.58, creatinine 0.7, Ca 9.4, glucose 106, cholesterol 262,   2/26/2020 calcitonin < 2, TPO < 10, HgbA1c 5.7%  12/14/2020 glucose 95, Ca 9, creatinine 0.59  4/24/23 Ca 9.9, creatinine 0.6, glucose 103    I have reviewed images on pACS  12/19/19 DXA : lowest T-score -2.4 L1-L2 spine.  There is a history of wrist fracture age 4.  There is a family history of hip fracture.  FRAX: 18/5.9% not including chlidhood fracture.  If I include that the major osteoporotic fracture risk is > 20%  Chart check :   11/22/2021 thyroid and neck US as read by me compared with 9/10/2020 12/19  Right superior posterior # 1 1 x 0.6 x 1.1 was  1 x 0.6 x 1.1 cm anechoic (was 0.6 x 0.9 x 1.1 cm)  Right anterior # 2 0.6 x 0.4 x 0.9 was 0.6 x 0.4 x 0.8 cm (was 0.7 x 0.5 x 0.8 cm )- ? microcalc  Right inferior # 3  0.4  X 0.3 x 0.4 cm  was 0.6 x 0.4 x 0.5 cm   Left anterior # 4 0.7 x 0.6 x 0.8 cm appears mixed    was  0.7 x 0.7 x 0.7 hypoechoic  (was 0.7 x 0.6 x 0.7 cm )  Left # 2 0.6 x 0.3 x 0.6 cm was 0.5 x 0.3 x 0.7 cm hypoechoic (was 0.5 x 0.3 x 0.6 cm)  Right level 4 # 1 0.4 x 0.5 x was  0.9 x 0.5 x 0.7 cm FNAB 11/23/2-2- benign    Right level 7 # 1 1.0 x 0.6 x 1.1 cm was 1.3 x 0.7 x 1.1 cm  Left level 3 0.9 x 0.4 x 1.7 cm  was   0.9 x 0.5 x 2 cm (was 0.8 x 0.4 x 1.7 cm)- FNAB 11/23/20- benign   Left level 7 # 1  1.2 x 0.4 x 1.2 was 1.1 x 0.6 x 0.9  cm      REVIEW OF SYSTEMS  Height not measured today -  Weight was measured today.   No problems  Swallow ok  Voice OK  Singing voice has gone to pot  Just had shot in the back at [employer] -  Passed out from muscle cramp pain     Answers for HPI/ROS submitted by the patient on 4/26/2023  General Symptoms: No  Skin Symptoms: No  HENT Symptoms: No  EYE SYMPTOMS: No  HEART SYMPTOMS: No  LUNG SYMPTOMS: No  INTESTINAL SYMPTOMS: No  URINARY SYMPTOMS: No  GYNECOLOGIC SYMPTOMS: No  BREAST SYMPTOMS: No  SKELETAL SYMPTOMS: Yes  BLOOD SYMPTOMS: No  NERVOUS SYSTEM SYMPTOMS: No  MENTAL HEALTH SYMPTOMS: No  Back pain: Yes  Muscle aches: Yes  Neck pain: Yes  Joint pain: Yes  Muscle cramps: Yes  Muscle weakness: Yes      Past Medical History  Past Medical History:   Diagnosis Date    Acrochordon     Atypical chest pain 1999    Normal stress test in 1999, performed for atypical chest pain    Benign nevus of skin     Numerous benign skin nevi    Biceps tendonitis     Bilateral bunions     Breast implant leak     Breast implants complicated by a leak. She then underwent replacement and repair.     Chronic fatigue     H/O urinary incontinence     Mild occasional female urinary incontinence which seems to be both urge and stress related.    History of bilateral cataract extraction     Hx of tubal ligation     Left hip pain     Occasional left hip pain due to osteoarthritis and/or trochanteric bursitis.    Lipoma of back 2014    Chronic lipoma of the left back, status post-surgical resection in January 2014    Low back pain     Occasional low back pain with radicular symptoms into the legs.    Osteoarthritis of knees, bilateral     Osteoarthritis of left hip     Osteopenia 09/13/2013    Postmenopausal status     Prediabetes 02/2020    Pubic bone pain     Musculoskeletal pubic bone pain due to a strain.    Rosacea     Seborrheic keratoses     Skin lesions     Atypical melanocytic lesions, status post multiple biopsies by Dr. Rivera in July of  2015. These biposies showed a junctional nevus with moderate atypia, which is also a dysplastic nevus and the final one was also a compound nevus, with mild atypia which is also a dysplastic nevus.  She was contacted by Dr. Rivera and was told to follow up with her again in July 2016, for re-evaluation of her skin.    Skin tag     Solar elastosis     Solar lentiginosis     Sternal fracture 2015    Mid-sternal fracture after blunt trauma while falling while running up stepsin May 2015. She may have also had some rib fractures as well. These healed with conservative management.    Symptomatic varicose veins of both lower extremities 2015    Lower extremity venous varicosities, intermittently symptomatic, status post evaluation with Dr. Alonso in General Surgery in the summer of 2015.    Symptomatic varicose veins of both lower extremities     Status post injection and compression sclerotherapy    Syncope 2018    Urticaria     Questionable exercise-induced urticaria     Rosacea  Syncope 2018    Past Surgical History:   Procedure Laterality Date    APPENDECTOMY      BUNIONECTOMY Bilateral 10/2017    CATARACT IOL, RT/LT Right     COLONOSCOPY  2014    repeat in 10 yr    DENTAL SURGERY      MAMMOPLASTY AUGMENTATION Bilateral 1972    RHYTIDECTOMY (FACELIFT)  01/23/2014    neck    SCLEROTHERAPY  10/03/2014    varicose veins    SURGICAL PATHOLOGY EXAM  01/13/2014    lower back    TONSILLECTOMY      TOTAL KNEE ARTHROPLASTY Right 12/2021    TUBAL LIGATION         Medications    Current Outpatient Medications   Medication Sig Dispense Refill    alendronate (FOSAMAX) 70 MG tablet Take 1 tablet (70 mg) by mouth every 7 days 12 tablet 3    amoxicillin (AMOXIL) 500 MG capsule Take 4 capsules 60 min prior to dental visit 4 capsule 1    atorvastatin (LIPITOR) 10 MG tablet Take 1 tablet (10 mg) by mouth daily 90 tablet 3    calcium carbonate 600 mg-vitamin D 400 units (CALTRATE) 600-400 MG-UNIT per tablet Take 1 tablet by mouth 2 times  daily 180 tablet 3    celecoxib (CELEBREX) 200 MG capsule Take 1 capsule (200 mg) by mouth daily 90 capsule 3    cetirizine (ZYRTEC) 10 MG tablet Take 1 tablet (10 mg) by mouth daily 90 tablet 3    fluticasone (FLONASE) 50 MCG/ACT nasal spray 2 sprays      metroNIDAZOLE (METROGEL) 1 % gel       Omega-3 Fatty Acids (FISH OIL PO)       tretinoin (RETIN-A) 0.1 % cream Apply QHS to face for rosacea      VITAMIN D PO Take 4,000 Units by mouth daily       Fish oil  Mg ? sometimes    Allergies  Allergies   Allergen Reactions    Cheese     Fentanyl Other (See Comments)     May have caused pt. To pass out after cataract     Latex Dermatitis    Methohexital Other (See Comments)     May have caused pt. To pass out after cataract     Midazolam Other (See Comments)     May have caused pt. To pass out after cataract surgery    Seasonal Allergies      Family History  family history includes Bladder Cancer (age of onset: 67) in her father; Breast Cancer (age of onset: 53) in her sister; Cerebrovascular Disease in her maternal aunt; Cerebrovascular Disease (age of onset: 95) in her mother; Heart Disease (age of onset: 50) in her brother; Hip fracture in her mother; Hyperlipidemia in her maternal aunt and maternal aunt; Osteoarthritis in her mother; Osteoporosis in her mother.    Social History  Social History     Tobacco Use    Smoking status: Never    Smokeless tobacco: Never   Vaping Use    Vaping status: Never Used   Substance Use Topics    Alcohol use: Yes     Comment: 0-1 per day    Drug use: No       Physical Exam  GENERAL no mask; pleasant; her voice is slightly raspy  BP (!) 141/80 (BP Location: Right arm, Patient Position: Sitting, Cuff Size: Adult Regular)   Pulse 112   Wt 78.5 kg (173 lb)   BMI 27.92 kg/m    SKIN: normal color, temperature, texture  HEENT: PER, no scleral icterus, eyelid retraction, stare, lid lag, proptosis or conjunctival injection.  .    NECK: supple.  No visible or palpable neck masses, cervical  adenopathy, or goiter.   LUNGS: clear to auscultation bilaterally.   CARDIAC: RRR, S1, S2 without murmurs, rubs or gallops.    BACK: normal spinal contour.    NEURO: Alert, responds appropriately to questions,  moves all extremities, DTRs 0/4, gait normal, no tremor of the outstretched hand    DATA REVIEW     Latest Reference Range & Units 04/24/23 13:43   Sodium 136 - 145 mmol/L 143   Potassium 3.4 - 5.3 mmol/L 4.2   Chloride 98 - 107 mmol/L 104   Carbon Dioxide (CO2) 22 - 29 mmol/L 25   Urea Nitrogen 8.0 - 23.0 mg/dL 15.5   Creatinine 0.51 - 0.95 mg/dL 0.60   GFR Estimate >60 mL/min/1.73m2 >90   Calcium 8.8 - 10.2 mg/dL 9.9   Anion Gap 7 - 15 mmol/L 14   Cholesterol <200 mg/dL 181   Glucose 70 - 99 mg/dL 103 (H)   HDL Cholesterol >=50 mg/dL 68   LDL Cholesterol Calculated <=100 mg/dL 102 (H)   Non HDL Cholesterol <130 mg/dL 113   Triglycerides <150 mg/dL 55   (H): Data is abnormally high       [facility] [place] - [address] [address]  Phone: [phone]   Fax: [phone]        Patient name:                          Yanely Jon  Patient demographics:            71 year old White Female   History:                                    Evaluation of Bone Density, Family History of Fracture, Family History of Osteoporosis, Post Menopausal and Thyroid Condition  Current treatments:                 Calcium, Cholesterol Lowering Drugs, Estrogen, Fosamax/Alendronate, Vitamin D  Scan:                                       At [facility]           Impression  The most negative and valid T-score of -2.0 at the level of the L1 - L2 lumbar spine corresponds with low bone density according to WHO criteria for postmenopausal females and men age 50 and over. The risk of osteoporotic fracture increases approximately 2-fold for each 1.0 SD decrease in T-score.     Results   Lumbar spine L1-L2  T-score -2.0 , BMD 0.922 g/cm2.      Left Femur neck  T-score -1.5  , BMD 0.823 g/cm2.  Left Total hip  T-score -1.4 , BMD 0.837 g/cm2.     Right Femur neck  T-score -1.7, BMD  0.806 g/cm2.  Right Total hip  T-score -1.3 , BMD  0.839 g/cm2.     Interval change  Bone density compared to the prior study has changed at L1-2 by +6.0%     Percent changes not mentioned or within remaining regions are insignificant  Please note that the differential diagnosis of BMD increase in the spine includes improvement due to pharmacotherapy vs inter-current progression of spine degeneration or fracture     Fracture risk   The risk of osteoporotic fracture increases approximately 2-fold for each 1.0 SD decrease in T-score.  Low bone density is not the only risk factor for fracture; consider factors such as patient's age, fall risk, injury risk, previous osteoporotic fracture, family history of osteoporosis, etc.       Repeat  For patients eligible for Medicare, routine testing is allowed once every 2 years.   Testing frequency can be increased for patients on corticosteroids.     Clinical correlation recommended      Technical quality  Satisfactory.   Abnormal vertebrae may be excluded from analysis if there is more than a 1.0 T-score difference between the vertebrae in question and adjacent vertebrae. Note the wide range in BMD values and T-scores within the lumbar spine. In the circumstances, the lumbar spine is represented by L1-L2        Principal result :  Jocelyne Waite MD, [facility]  Division of Endocrinology and Diabetes    Department of Medicine        Olga Murry MD

## 2023-05-04 ENCOUNTER — LAB (OUTPATIENT)
Dept: LAB | Facility: CLINIC | Age: 73
End: 2023-05-04
Payer: MEDICARE

## 2023-05-04 DIAGNOSIS — E04.1 THYROID NODULE: ICD-10-CM

## 2023-05-04 DIAGNOSIS — M85.9 LOW BONE DENSITY: ICD-10-CM

## 2023-05-04 LAB
T4 FREE SERPL-MCNC: 1.29 NG/DL (ref 0.9–1.7)
TSH SERPL DL<=0.005 MIU/L-ACNC: 1.19 UIU/ML (ref 0.3–4.2)

## 2023-05-04 PROCEDURE — 84443 ASSAY THYROID STIM HORMONE: CPT | Mod: ORL

## 2023-05-04 PROCEDURE — 82306 VITAMIN D 25 HYDROXY: CPT | Mod: ORL

## 2023-05-04 PROCEDURE — 84439 ASSAY OF FREE THYROXINE: CPT | Mod: ORL

## 2023-05-05 ENCOUNTER — TELEPHONE (OUTPATIENT)
Dept: FAMILY MEDICINE | Facility: CLINIC | Age: 73
End: 2023-05-05

## 2023-05-05 LAB — DEPRECATED CALCIDIOL+CALCIFEROL SERPL-MC: 53 UG/L (ref 20–75)

## 2023-05-05 NOTE — TELEPHONE ENCOUNTER
Who is calling? Patient  Reason for Call:Blood Pressure results:   4/25 185/92  4/26 137/83  5/2 135/80  5/3 141/80  5/4 128/94

## 2023-05-08 ENCOUNTER — ANCILLARY PROCEDURE (OUTPATIENT)
Dept: MAMMOGRAPHY | Facility: CLINIC | Age: 73
End: 2023-05-08
Attending: INTERNAL MEDICINE
Payer: MEDICARE

## 2023-05-08 DIAGNOSIS — Z12.31 VISIT FOR SCREENING MAMMOGRAM: ICD-10-CM

## 2023-05-08 PROCEDURE — 77063 BREAST TOMOSYNTHESIS BI: CPT

## 2023-05-08 PROCEDURE — 77067 SCR MAMMO BI INCL CAD: CPT

## 2023-05-11 ENCOUNTER — OFFICE VISIT (OUTPATIENT)
Dept: VASCULAR SURGERY | Facility: CLINIC | Age: 73
End: 2023-05-11
Attending: INTERNAL MEDICINE
Payer: MEDICARE

## 2023-05-11 DIAGNOSIS — I83.12 VARICOSE VEINS OF BOTH LOWER EXTREMITIES WITH INFLAMMATION: ICD-10-CM

## 2023-05-11 DIAGNOSIS — I83.11 VARICOSE VEINS OF BOTH LOWER EXTREMITIES WITH INFLAMMATION: ICD-10-CM

## 2023-05-11 PROCEDURE — 99203 OFFICE O/P NEW LOW 30 MIN: CPT | Performed by: SURGERY

## 2023-05-11 NOTE — PROGRESS NOTES
"I had the pleasure of seeing Yanely Jon in the vein clinic today.  She is a very pleasant and active retired 72-year-old  who comes to us with pruritus in the left lower extremity.  She has noted the pruritus in the outer lower leg in the area of spider veins.  She tells me that many years ago she had large ropey varicosities which were \"tied off and injected\"    She is also an avid golfer and has started using hydrocortisone for the pruritus.    On examination she has scattered varicose veins in both lower extremities.  The area of the pruritus she describes in the left lower extremity is located in the lateral upper anterior leg.  There are multiple spider veins and reticular veins in that area as well.    I explained the pathophysiology of disease as it relates to pruritus in the setting of spider veins.  I have advised using calamine lotion in the morning before she goes about her day-to-day activities.    I have asked her to come back and see me in 4 weeks so we can see how the application of the calamine lotion is helping her pruritus.  If it does not resolve the problem then we can consider injecting the spider veins.  This is with the caveat that following the injection of the spider veins the pruritus actually gets worse due to the inflammation caused by the polidocanol.  I am hoping we will not have to do sclerotherapy for this.  "

## 2023-05-11 NOTE — NURSING NOTE
Patient Reported symptoms:    Bilateral leg   Heaviness Some of the time   Achiness A good bit of the time   Swelling A little of the time   Throbbing A good bit of the time   Itching All of the time  Appearance Moderately noticeable   Impact on work/activities Moderately reduced

## 2023-05-11 NOTE — LETTER
"    5/11/2023         RE: Yanely Jon  [address]  [facility] [address]        Dear Colleague,    Thank you for referring your patient, Yanely Jon, to the [facility] VEIN CLINIC [place]. Please see a copy of my visit note below.    I had the pleasure of seeing Yanely Jon in the vein clinic today.  She is a very pleasant and active retired 72-year-old  who comes to us with pruritus in the left lower extremity.  She has noted the pruritus in the outer lower leg in the area of spider veins.  She tells me that many years ago she had large ropey varicosities which were \"tied off and injected\"    She is also an avid golfer and has started using hydrocortisone for the pruritus.    On examination she has scattered varicose veins in both lower extremities.  The area of the pruritus she describes in the left lower extremity is located in the lateral upper anterior leg.  There are multiple spider veins and reticular veins in that area as well.    I explained the pathophysiology of disease as it relates to pruritus in the setting of spider veins.  I have advised using calamine lotion in the morning before she goes about her day-to-day activities.    I have asked her to come back and see me in 4 weeks so we can see how the application of the calamine lotion is helping her pruritus.  If it does not resolve the problem then we can consider injecting the spider veins.  This is with the caveat that following the injection of the spider veins the pruritus actually gets worse due to the inflammation caused by the polidocanol.  I am hoping we will not have to do sclerotherapy for this.      Again, thank you for allowing me to participate in the care of your patient.        Sincerely,        Kaz Smith MD    " 8/23/22 129/71  8/22/22 129/66  8/19/22 126/67  8/18/22 124/66  8/10/22 127/66  8/08/22 97/60 held Metoprolol

## 2023-05-15 NOTE — NURSING NOTE
"Date measured: 5/11/23  Measured by: GIOVANNY    Measurements:  Right Ankle: 20.5cm  Right Calf: 36cm  Right Thigh: 60cm    Left Ankle: 21.5cm  Left Calf: 36cm  Left Thigh: 59cm    Leg Length: 75cm  Calf Length: 45cm  Height: 5' 7\"  Shoe Size: 9.5-10    Beth Leyva RN  [facility]  Vein Clinic  "

## 2023-05-16 ENCOUNTER — TRANSFERRED RECORDS (OUTPATIENT)
Dept: HEALTH INFORMATION MANAGEMENT | Facility: CLINIC | Age: 73
End: 2023-05-16
Payer: MEDICARE

## 2023-06-02 ENCOUNTER — TRANSFERRED RECORDS (OUTPATIENT)
Dept: HEALTH INFORMATION MANAGEMENT | Facility: CLINIC | Age: 73
End: 2023-06-02
Payer: MEDICARE

## 2023-06-15 ENCOUNTER — OFFICE VISIT (OUTPATIENT)
Dept: VASCULAR SURGERY | Facility: CLINIC | Age: 73
End: 2023-06-15
Payer: MEDICARE

## 2023-06-15 DIAGNOSIS — I83.892 SYMPTOMATIC VARICOSE VEINS OF LEFT LOWER EXTREMITY: Primary | ICD-10-CM

## 2023-06-15 PROCEDURE — 99213 OFFICE O/P EST LOW 20 MIN: CPT | Performed by: SURGERY

## 2023-06-15 NOTE — PROGRESS NOTES
Ashtyn Jon is a very pleasant 73-year-old female who I had seen previously for significant pruritus in the area of the spider veins in the left lower extremity.  I had asked her to start using calamine lotion.  While calamine is effective she experiences more burning as well as she is more active.  She specifically has not had any pain or bleeding from the varicose veins.  She tells me that the discomfort of burning and pruritus is getting to be lifestyle limiting specially in the summer months.    Given that this is now becoming a quality of life issue we will submit to her insurance to see if they will approve for a medically necessary sclerotherapy of the varicose veins.    I have also asked her to continue using the calamine lotion for symptom control.  I will see her back in 4 weeks.  For the next 3 weeks she will continue to use the calamine lotion and then stop using it the week prior to our clinic visit and we will see if her insurance company approved for sclerotherapy injection treatment.    I will see her back in 4 weeks.

## 2023-06-15 NOTE — LETTER
6/15/2023         RE: Yanely Jon  [address]  [facility] [address]        Dear Colleague,    Thank you for referring your patient, Yanely Jon, to the [facility] VEIN CLINIC [place]. Please see a copy of my visit note below.    Ashtyn Jon is a very pleasant 73-year-old female who I had seen previously for significant pruritus in the area of the spider veins in the left lower extremity.  I had asked her to start using calamine lotion.  While calamine is effective she experiences more burning as well as she is more active.  She specifically has not had any pain or bleeding from the varicose veins.  She tells me that the discomfort of burning and pruritus is getting to be lifestyle limiting specially in the summer months.    Given that this is now becoming a quality of life issue we will submit to her insurance to see if they will approve for a medically necessary sclerotherapy of the varicose veins.    I have also asked her to continue using the calamine lotion for symptom control.  I will see her back in 4 weeks.  For the next 3 weeks she will continue to use the calamine lotion and then stop using it the week prior to our clinic visit and we will see if her insurance company approved for sclerotherapy injection treatment.    I will see her back in 4 weeks.      Again, thank you for allowing me to participate in the care of your patient.        Sincerely,        Kaz Smith MD

## 2023-06-27 ENCOUNTER — TRANSFERRED RECORDS (OUTPATIENT)
Dept: HEALTH INFORMATION MANAGEMENT | Facility: CLINIC | Age: 73
End: 2023-06-27
Payer: MEDICARE

## 2023-07-13 ENCOUNTER — OFFICE VISIT (OUTPATIENT)
Dept: VASCULAR SURGERY | Facility: CLINIC | Age: 73
End: 2023-07-13
Payer: MEDICARE

## 2023-07-13 DIAGNOSIS — I83.92 SPIDER VEIN OF LEFT LOWER EXTREMITY: Primary | ICD-10-CM

## 2023-07-13 PROCEDURE — 99212 OFFICE O/P EST SF 10 MIN: CPT | Performed by: SURGERY

## 2023-07-13 RX ORDER — DULOXETIN HYDROCHLORIDE 30 MG/1
CAPSULE, DELAYED RELEASE ORAL
COMMUNITY
Start: 2023-06-27

## 2023-07-13 NOTE — LETTER
7/13/2023         RE: Yanely Jon  [address]  [facility] [address]        Dear Colleague,    Thank you for referring your patient, Yanely Jon, to the [facility] VEIN CLINIC [place]. Please see a copy of my visit note below.    Mrs. Jon is a very pleasant 73-year-old female who I had seen previously for pruritus in an area of spider veins in the left lower extremity.  She had responded well to calamine lotion.  When I saw her about a month ago I had advised that she start using calamine lotion for 3 weeks and then stop so we can reevaluate.  She stopped the calamine lotion about a week ago and the pruritus has not recurred.    My concern is that if we inject these areas of the spider veins then the inflammation that we will ensue will further exacerbate the symptoms of the pruritus.    At this time I really do not feel there is much to be gained with sclerotherapy injection and she should continue to use the calamine lotion.  If at some point this stops working then we can entertain treating the vitamins.    She can follow-up as needed.  She is in complete agreement with the plan and conservative approach.      Again, thank you for allowing me to participate in the care of your patient.        Sincerely,        Kaz Smith MD

## 2023-07-13 NOTE — PROGRESS NOTES
Mrs. Jon is a very pleasant 73-year-old female who I had seen previously for pruritus in an area of spider veins in the left lower extremity.  She had responded well to calamine lotion.  When I saw her about a month ago I had advised that she start using calamine lotion for 3 weeks and then stop so we can reevaluate.  She stopped the calamine lotion about a week ago and the pruritus has not recurred.    My concern is that if we inject these areas of the spider veins then the inflammation that we will ensue will further exacerbate the symptoms of the pruritus.    At this time I really do not feel there is much to be gained with sclerotherapy injection and she should continue to use the calamine lotion.  If at some point this stops working then we can entertain treating the vitamins.    She can follow-up as needed.  She is in complete agreement with the plan and conservative approach.

## 2023-07-18 ENCOUNTER — TRANSFERRED RECORDS (OUTPATIENT)
Dept: HEALTH INFORMATION MANAGEMENT | Facility: CLINIC | Age: 73
End: 2023-07-18
Payer: MEDICARE

## 2023-08-29 ENCOUNTER — TRANSFERRED RECORDS (OUTPATIENT)
Dept: HEALTH INFORMATION MANAGEMENT | Facility: CLINIC | Age: 73
End: 2023-08-29
Payer: MEDICARE

## 2023-10-05 ENCOUNTER — TRANSFERRED RECORDS (OUTPATIENT)
Dept: HEALTH INFORMATION MANAGEMENT | Facility: CLINIC | Age: 73
End: 2023-10-05
Payer: MEDICARE

## 2023-10-12 ENCOUNTER — TRANSFERRED RECORDS (OUTPATIENT)
Dept: HEALTH INFORMATION MANAGEMENT | Facility: CLINIC | Age: 73
End: 2023-10-12
Payer: MEDICARE

## 2023-11-21 ENCOUNTER — ANCILLARY PROCEDURE (OUTPATIENT)
Dept: ULTRASOUND IMAGING | Facility: CLINIC | Age: 73
End: 2023-11-21
Payer: MEDICARE

## 2023-11-21 DIAGNOSIS — R59.0 CERVICAL ADENOPATHY: ICD-10-CM

## 2023-11-21 DIAGNOSIS — Z78.0 POSTMENOPAUSAL STATUS: ICD-10-CM

## 2023-11-21 DIAGNOSIS — E04.1 THYROID NODULE: ICD-10-CM

## 2023-11-21 DIAGNOSIS — M85.9 LOW BONE DENSITY: ICD-10-CM

## 2023-11-21 PROCEDURE — 76536 US EXAM OF HEAD AND NECK: CPT | Mod: GC | Performed by: STUDENT IN AN ORGANIZED HEALTH CARE EDUCATION/TRAINING PROGRAM

## 2023-12-15 ENCOUNTER — TRANSFERRED RECORDS (OUTPATIENT)
Dept: HEALTH INFORMATION MANAGEMENT | Facility: CLINIC | Age: 73
End: 2023-12-15
Payer: MEDICARE

## 2024-01-09 ENCOUNTER — TRANSFERRED RECORDS (OUTPATIENT)
Dept: HEALTH INFORMATION MANAGEMENT | Facility: CLINIC | Age: 74
End: 2024-01-09
Payer: MEDICARE

## 2024-04-26 ENCOUNTER — OFFICE VISIT (OUTPATIENT)
Dept: FAMILY MEDICINE | Facility: CLINIC | Age: 74
End: 2024-04-26
Payer: MEDICARE

## 2024-04-26 VITALS
HEIGHT: 66 IN | BODY MASS INDEX: 28.77 KG/M2 | DIASTOLIC BLOOD PRESSURE: 82 MMHG | OXYGEN SATURATION: 96 % | HEART RATE: 108 BPM | SYSTOLIC BLOOD PRESSURE: 130 MMHG | RESPIRATION RATE: 15 BRPM | WEIGHT: 179 LBS | TEMPERATURE: 97.3 F

## 2024-04-26 DIAGNOSIS — E78.5 HYPERLIPIDEMIA LDL GOAL <130: ICD-10-CM

## 2024-04-26 DIAGNOSIS — M15.8 OTHER OSTEOARTHRITIS INVOLVING MULTIPLE JOINTS: ICD-10-CM

## 2024-04-26 DIAGNOSIS — Z12.31 VISIT FOR SCREENING MAMMOGRAM: ICD-10-CM

## 2024-04-26 DIAGNOSIS — J30.2 SEASONAL ALLERGIC RHINITIS, UNSPECIFIED TRIGGER: ICD-10-CM

## 2024-04-26 DIAGNOSIS — Z23 NEED FOR VACCINATION: ICD-10-CM

## 2024-04-26 DIAGNOSIS — Z00.00 ENCOUNTER FOR MEDICARE ANNUAL WELLNESS EXAM: Primary | ICD-10-CM

## 2024-04-26 DIAGNOSIS — H91.93 BILATERAL CHANGE IN HEARING: ICD-10-CM

## 2024-04-26 DIAGNOSIS — R63.5 WEIGHT GAIN: ICD-10-CM

## 2024-04-26 DIAGNOSIS — Z13.1 ENCOUNTER FOR SCREENING EXAMINATION FOR IMPAIRED GLUCOSE REGULATION AND DIABETES MELLITUS: ICD-10-CM

## 2024-04-26 DIAGNOSIS — Z12.11 SCREEN FOR COLON CANCER: ICD-10-CM

## 2024-04-26 DIAGNOSIS — M85.88 OSTEOPENIA OF LUMBAR SPINE: ICD-10-CM

## 2024-04-26 DIAGNOSIS — E28.39 ESTROGEN DEFICIENCY: ICD-10-CM

## 2024-04-26 LAB
ALBUMIN SERPL BCG-MCNC: 4.5 G/DL (ref 3.5–5.2)
ALP SERPL-CCNC: 55 U/L (ref 40–150)
ALT SERPL W P-5'-P-CCNC: 38 U/L (ref 0–50)
ANION GAP SERPL CALCULATED.3IONS-SCNC: 9 MMOL/L (ref 7–15)
AST SERPL W P-5'-P-CCNC: 31 U/L (ref 0–45)
BILIRUB SERPL-MCNC: 0.3 MG/DL
BUN SERPL-MCNC: 17.5 MG/DL (ref 8–23)
CALCIUM SERPL-MCNC: 9.5 MG/DL (ref 8.8–10.2)
CHLORIDE SERPL-SCNC: 107 MMOL/L (ref 98–107)
CHOLEST SERPL-MCNC: 183 MG/DL
CREAT SERPL-MCNC: 0.6 MG/DL (ref 0.51–0.95)
DEPRECATED HCO3 PLAS-SCNC: 26 MMOL/L (ref 22–29)
EGFRCR SERPLBLD CKD-EPI 2021: >90 ML/MIN/1.73M2
FASTING STATUS PATIENT QL REPORTED: YES
GLUCOSE SERPL-MCNC: 104 MG/DL (ref 70–99)
HDLC SERPL-MCNC: 73 MG/DL
LDLC SERPL CALC-MCNC: 98 MG/DL
NONHDLC SERPL-MCNC: 110 MG/DL
POTASSIUM SERPL-SCNC: 4.3 MMOL/L (ref 3.4–5.3)
PROT SERPL-MCNC: 7.3 G/DL (ref 6.4–8.3)
SODIUM SERPL-SCNC: 142 MMOL/L (ref 135–145)
TRIGL SERPL-MCNC: 58 MG/DL

## 2024-04-26 PROCEDURE — 84460 ALANINE AMINO (ALT) (SGPT): CPT | Mod: ORL | Performed by: INTERNAL MEDICINE

## 2024-04-26 PROCEDURE — 82465 ASSAY BLD/SERUM CHOLESTEROL: CPT | Mod: ORL | Performed by: INTERNAL MEDICINE

## 2024-04-26 RX ORDER — CELECOXIB 200 MG/1
200 CAPSULE ORAL DAILY
Qty: 90 CAPSULE | Refills: 3 | Status: SHIPPED | OUTPATIENT
Start: 2024-04-26

## 2024-04-26 RX ORDER — ATORVASTATIN CALCIUM 10 MG/1
10 TABLET, FILM COATED ORAL DAILY
Qty: 90 TABLET | Refills: 3 | Status: SHIPPED | OUTPATIENT
Start: 2024-04-26

## 2024-04-26 RX ORDER — ALENDRONATE SODIUM 70 MG/1
70 TABLET ORAL
Qty: 12 TABLET | Refills: 3 | Status: SHIPPED | OUTPATIENT
Start: 2024-04-26

## 2024-04-26 SDOH — HEALTH STABILITY: PHYSICAL HEALTH: ON AVERAGE, HOW MANY MINUTES DO YOU ENGAGE IN EXERCISE AT THIS LEVEL?: 40 MIN

## 2024-04-26 SDOH — HEALTH STABILITY: PHYSICAL HEALTH: ON AVERAGE, HOW MANY DAYS PER WEEK DO YOU ENGAGE IN MODERATE TO STRENUOUS EXERCISE (LIKE A BRISK WALK)?: 5 DAYS

## 2024-04-26 ASSESSMENT — ANXIETY QUESTIONNAIRES
IF YOU CHECKED OFF ANY PROBLEMS ON THIS QUESTIONNAIRE, HOW DIFFICULT HAVE THESE PROBLEMS MADE IT FOR YOU TO DO YOUR WORK, TAKE CARE OF THINGS AT HOME, OR GET ALONG WITH OTHER PEOPLE: NOT DIFFICULT AT ALL
GAD7 TOTAL SCORE: 0
GAD7 TOTAL SCORE: 0
4. TROUBLE RELAXING: NOT AT ALL
6. BECOMING EASILY ANNOYED OR IRRITABLE: NOT AT ALL
5. BEING SO RESTLESS THAT IT IS HARD TO SIT STILL: NOT AT ALL
2. NOT BEING ABLE TO STOP OR CONTROL WORRYING: NOT AT ALL
3. WORRYING TOO MUCH ABOUT DIFFERENT THINGS: NOT AT ALL
GAD7 TOTAL SCORE: 0
8. IF YOU CHECKED OFF ANY PROBLEMS, HOW DIFFICULT HAVE THESE MADE IT FOR YOU TO DO YOUR WORK, TAKE CARE OF THINGS AT HOME, OR GET ALONG WITH OTHER PEOPLE?: NOT DIFFICULT AT ALL
1. FEELING NERVOUS, ANXIOUS, OR ON EDGE: NOT AT ALL
7. FEELING AFRAID AS IF SOMETHING AWFUL MIGHT HAPPEN: NOT AT ALL
7. FEELING AFRAID AS IF SOMETHING AWFUL MIGHT HAPPEN: NOT AT ALL

## 2024-04-26 ASSESSMENT — PATIENT HEALTH QUESTIONNAIRE - PHQ9
SUM OF ALL RESPONSES TO PHQ QUESTIONS 1-9: 1
SUM OF ALL RESPONSES TO PHQ QUESTIONS 1-9: 1
10. IF YOU CHECKED OFF ANY PROBLEMS, HOW DIFFICULT HAVE THESE PROBLEMS MADE IT FOR YOU TO DO YOUR WORK, TAKE CARE OF THINGS AT HOME, OR GET ALONG WITH OTHER PEOPLE: NOT DIFFICULT AT ALL

## 2024-04-26 ASSESSMENT — SOCIAL DETERMINANTS OF HEALTH (SDOH): HOW OFTEN DO YOU GET TOGETHER WITH FRIENDS OR RELATIVES?: MORE THAN THREE TIMES A WEEK

## 2024-04-26 NOTE — NURSING NOTE
"73 year old  Chief Complaint   Patient presents with    Physical       Blood pressure 137/85, pulse 108, temperature 97.3  F (36.3  C), temperature source Skin, resp. rate 15, height 1.68 m (5' 6.14\"), weight 81.2 kg (179 lb), SpO2 96%. Body mass index is 28.77 kg/m .  Patient Active Problem List   Diagnosis    Cervicalgia    Thyroid nodule    Primary osteoarthritis of knee    Seasonal allergic rhinitis    Rosacea    Varicose veins of both lower extremities with inflammation    Osteopenia of lumbar spine    Abnormal fasting glucose    Low bone density    Personal history of irradiation, presenting hazards to health    Cervical adenopathy    Postmenopausal status    Elevated blood pressure reading    Other osteoarthritis involving multiple joints    Hyperlipidemia LDL goal <130    Muscle cramps    On corticosteroid therapy       Wt Readings from Last 2 Encounters:   04/26/24 81.2 kg (179 lb)   05/03/23 78.5 kg (173 lb)     BP Readings from Last 3 Encounters:   04/26/24 137/85   05/03/23 (!) 141/80   04/24/23 (!) 148/98         Current Outpatient Medications   Medication Sig Dispense Refill    alendronate (FOSAMAX) 70 MG tablet Take 1 tablet (70 mg) by mouth every 7 days 12 tablet 3    amoxicillin (AMOXIL) 500 MG capsule Take 4 capsules 60 min prior to dental visit 4 capsule 1    atorvastatin (LIPITOR) 10 MG tablet Take 1 tablet (10 mg) by mouth daily 90 tablet 3    calcium carbonate 600 mg-vitamin D 400 units (CALTRATE) 600-400 MG-UNIT per tablet Take 1 tablet by mouth 2 times daily 180 tablet 3    celecoxib (CELEBREX) 200 MG capsule Take 1 capsule (200 mg) by mouth daily 90 capsule 3    cetirizine (ZYRTEC) 10 MG tablet Take 1 tablet (10 mg) by mouth daily 90 tablet 3    DULoxetine (CYMBALTA) 30 MG capsule       fluticasone (FLONASE) 50 MCG/ACT nasal spray 2 sprays      metroNIDAZOLE (METROGEL) 1 % gel       Omega-3 Fatty Acids (FISH OIL PO)       tretinoin (RETIN-A) 0.1 % cream Apply QHS to face for rosacea      " "VITAMIN D PO Take 4,000 Units by mouth daily       No current facility-administered medications for this visit.       Social History     Tobacco Use    Smoking status: Never    Smokeless tobacco: Never   Vaping Use    Vaping status: Never Used   Substance Use Topics    Alcohol use: Yes     Comment: 0-1 per day    Drug use: No       Health Maintenance Due   Topic Date Due    ADVANCE CARE PLANNING  11/13/2022    COVID-19 Vaccine (8 - 2023-24 season) 02/29/2024    LIPID  04/24/2024    DEXA  05/04/2024       No results found for: \"PAP\"      April 26, 2024 8:30 AM    "

## 2024-04-26 NOTE — PROGRESS NOTES
Preventive Care Visit  [facility]  Sherlyn Almanzar MD, Internal Medicine  Apr 26, 2024        See separate physical exam note by Dr. Almanzar      Franklyn Chamorro is a 73 year old, presenting for the following:  Physical      Health Care Directive  Patient does not have a Health Care Directive or Living Will: Advance Directive received and scanned. Click on Code in the patient header to view.          4/26/2024   General Health   How would you rate your overall physical health? (!) FAIR   Feel stress (tense, anxious, or unable to sleep) Not at all         4/26/2024   Nutrition   Diet: Carbohydrate counting         4/26/2024   Exercise   Days per week of moderate/strenous exercise 5 days   Average minutes spent exercising at this level 40 min         4/26/2024   Social Factors   Frequency of gathering with friends or relatives More than three times a week   Worry food won't last until get money to buy more No   Food not last or not have enough money for food? No   Do you have housing?  Yes   Are you worried about losing your housing? No   Lack of transportation? No   Unable to get utilities (heat,electricity)? No         4/26/2024   Fall Risk   Fallen 2 or more times in the past year? No   Trouble with walking or balance? No          4/26/2024   Activities of Daily Living- Home Safety   Needs help with the following daily activites None of the above   Safety concerns in the home None of the above         4/26/2024   Dental   Dentist two times every year? Yes         4/26/2024   Hearing Screening   Hearing concerns? (!) TROUBLE UNDERSTANDING SOFT OR WHISPERED SPEECH.         4/26/2024   Driving Risk Screening   Patient/family members have concerns about driving No         4/26/2024   General Alertness/Fatigue Screening   Have you been more tired than usual lately? (!) YES         4/26/2024   Urinary Incontinence Screening   Bothered by leaking urine in past 6 months No         4/26/2024   TB Screening    Were you born outside of the [place]? No       Today's PHQ-9 Score:       4/26/2024     8:00 AM   PHQ-9 SCORE   PHQ-9 Total Score MyChart 1 (Minimal depression)   PHQ-9 Total Score 1         4/26/2024   Substance Use   Alcohol more than 3/day or more than 7/wk No   Do you have a current opioid prescription? No   How severe/bad is pain from 1 to 10? 5/10   Do you use any other substances recreationally? No     Social History     Tobacco Use    Smoking status: Never    Smokeless tobacco: Never   Vaping Use    Vaping status: Never Used   Substance Use Topics    Alcohol use: Yes     Comment: 0-1 per day    Drug use: No         5/8/2023   LAST FHS-7 RESULTS   1st degree relative breast or ovarian cancer Yes   Any relative bilateral breast cancer Yes   Any male have breast cancer No   Any ONE woman have BOTH breast AND ovarian cancer No   Any woman with breast cancer before 50yrs Yes   2 or more relatives with breast AND/OR ovarian cancer No   2 or more relatives with breast AND/OR bowel cancer No     ASCVD Risk   The 10-year ASCVD risk score (Kodi HOPKINS, et al., 2019) is: 18%    Values used to calculate the score:      Age: 73 years      Sex: Female      Is Non- : No      Diabetic: No      Tobacco smoker: No      Systolic Blood Pressure: 137 mmHg      Is BP treated: No      HDL Cholesterol: 68 mg/dL      Total Cholesterol: 181 mg/dL    Health Maintenance   Topic Date Due    ADVANCE CARE PLANNING  11/13/2022    COVID-19 Vaccine (8 - 2023-24 season) 02/29/2024    LIPID  04/24/2024    DEXA  05/04/2024    COLORECTAL CANCER SCREENING  10/29/2024    MEDICARE ANNUAL WELLNESS VISIT  04/26/2025    FALL RISK ASSESSMENT  04/26/2025    GLUCOSE  04/24/2026    DTAP/TDAP/TD IMMUNIZATION (5 - Td or Tdap) 10/25/2027    HEPATITIS C SCREENING  Completed    PHQ-2 (once per calendar year)  Completed    INFLUENZA VACCINE  Completed    Pneumococcal Vaccine: 65+ Years  Completed    ZOSTER IMMUNIZATION  Completed     RSV VACCINE (Pregnancy & 60+)  Completed    IPV IMMUNIZATION  Aged Out    HPV IMMUNIZATION  Aged Out    MENINGITIS IMMUNIZATION  Aged Out    RSV MONOCLONAL ANTIBODY  Aged Out    MAMMO SCREENING  Discontinued           4/26/2024   Mini Cog   Clock Draw Score 2 Normal   3 Item Recall 3 objects recalled   Mini Cog Total Score 5              Signed Electronically by: Sherlyn Almanzar MD    Answers submitted by the patient for this visit:  Patient Health Questionnaire (Submitted on 4/26/2024)  If you checked off any problems, how difficult have these problems made it for you to do your work, take care of things at home, or get along with other people?: Not difficult at all  PHQ9 TOTAL SCORE: 1  XIOMARA-7 (Submitted on 4/26/2024)  XIOMARA 7 TOTAL SCORE: 0

## 2024-04-26 NOTE — PATIENT INSTRUCTIONS
Ask insurance about coverage of weight loss medication like Wegovy    We have a nutritionist at [place] if you wish to discuss        Preventive Care Advice   This is general advice given by our system to help you stay healthy. However, your care team may have specific advice just for you. Please talk to your care team about your preventive care needs.  Nutrition  Eat 5 or more servings of fruits and vegetables each day.  Try wheat bread, brown rice and whole grain pasta (instead of white bread, rice, and pasta).  Get enough calcium and vitamin D. Check the label on foods and aim for 100% of the RDA (recommended daily allowance).  Lifestyle  Exercise at least 150 minutes each week   (30 minutes a day, 5 days a week).  Do muscle strengthening activities 2 days a week. These help control your weight and prevent disease.  No smoking.  Wear sunscreen to prevent skin cancer.  Have a dental exam and cleaning every 6 months.  Yearly exams  See your health care team every year to talk about:  Any changes in your health.  Any medicines your care team has prescribed.  Preventive care, family planning, and ways to prevent chronic diseases.  Shots (vaccines)   HPV shots (up to age 26), if you've never had them before.  Hepatitis B shots (up to age 59), if you've never had them before.  COVID-19 shot: Get this shot when it's due.  Flu shot: Get a flu shot every year.  Tetanus shot: Get a tetanus shot every 10 years.  Pneumococcal, hepatitis A, and RSV shots: Ask your care team if you need these based on your risk.  Shingles shot (for age 50 and up).  General health tests  Diabetes screening:  Starting at age 35, Get screened for diabetes at least every 3 years.  If you are younger than age 35, ask your care team if you should be screened for diabetes.  Cholesterol test: At age 39, start having a cholesterol test every 5 years, or more often if advised.  Bone density scan (DEXA): At age 50, ask your care team if you should have  this scan for osteoporosis (brittle bones).  Hepatitis C: Get tested at least once in your life.  STIs (sexually transmitted infections)  Before age 24: Ask your care team if you should be screened for STIs.  After age 24: Get screened for STIs if you're at risk. You are at risk for STIs (including HIV) if:  You are sexually active with more than one person.  You don't use condoms every time.  You or a partner was diagnosed with a sexually transmitted infection.  If you are at risk for HIV, ask about PrEP medicine to prevent HIV.  Get tested for HIV at least once in your life, whether you are at risk for HIV or not.  Cancer screening tests  Cervical cancer screening: If you have a cervix, begin getting regular cervical cancer screening tests at age 21. Most people who have regular screenings with normal results can stop after age 65. Talk about this with your provider.  Breast cancer scan (mammogram): If you've ever had breasts, begin having regular mammograms starting at age 40. This is a scan to check for breast cancer.  Colon cancer screening: It is important to start screening for colon cancer at age 45.  Have a colonoscopy test every 10 years (or more often if you're at risk) Or, ask your provider about stool tests like a FIT test every year or Cologuard test every 3 years.  To learn more about your testing options, visit: https://www.[email or web address]/425377.pdf.  For help making a decision, visit: https://bit.ly/ez21291.  Prostate cancer screening test: If you have a prostate and are age 55 to 69, ask your provider if you would benefit from a yearly prostate cancer screening test.  Lung cancer screening: If you are a current or former smoker age 50 to 80, ask your care team if ongoing lung cancer screenings are right for you.  For informational purposes only. Not to replace the advice of your health care provider. Copyright   2023 [employer]. All rights reserved. Clinically reviewed by the [facility]  [place] [facility]. [place] [id number] - REV 01/24.

## 2024-04-26 NOTE — PROGRESS NOTES
"Yanely Jon is a 73 year old female with hx of osteopenia, thyroid nodule, seasonal allergies,osteoarthritis, hyperlipidemia, rosacea, elevated BP.  She is here for a general check up.  She is fasting. She is up to date on eye exams and dental visits.    [facility]  Advanced directive: 2017  COVID vaccine eligible for booster--will do today  Other vaccines  all up to date  Colon cancer screening due 10/2024  Mammogram last done 5/2023, goes yearly  DEXA due now, on Alendronate, improving    Medicare questions:  Hearing concerns: No.   Fall Risk: No.   Independent at home: Yes  Safe : Yes  Memory concerns: No.     COGNITIVE SCREEN  1) Repeat 3 items (Banana, Sunrise, Chair)    2) Clock draw: NORMAL  3) 3 item recall: Recalls 3 objects  Results: 3 items recalled: COGNITIVE IMPAIRMENT LESS LIKELY    Mini-CogTM Copyright [employer] [place]. Licensed by the author for use in [facility] [employer]; reprinted with permission ([email or web address]). All rights reserved.      Diet:Omnivore  Up 20 lb since 2022,   Attributes this to less activity  Wt Readings from Last 4 Encounters:   04/26/24 81.2 kg (179 lb)   05/03/23 78.5 kg (173 lb)   04/24/23 78 kg (172 lb)   04/19/22 71.2 kg (157 lb)     Body mass index is 28.77 kg/m .    Elevated blood pressure  BP in target range today  Home BP machine, has not brought to clinic to check  BP Readings from Last 3 Encounters:   04/26/24 137/85   05/03/23 (!) 141/80   04/24/23 (!) 148/98       Hyperlipidemia LDL goal <130  Atorvastatin 10mg daily  No chest pain, palpitations, or near syncope    Osteoarthritis  Left knee, foot (injection 1/2024) , due now for \"rooster comb\" injections  S/p L4-S1 RFA 9/2023, had LBP with radicular symptoms down leg  Taking duloxetine 30mg bid (rx from [facility])  Celebrex 200mg daily , no GI complaints    Osteopenia of lumbar spine  On alendronate weekly dose, started 2020  Bone density improving by 6% in 2022, due now for follow up  Calcium --taking supplements, discussed " recommendations today  Less weight bearing exercise due to back,knee and foot pain    Concern for sinuses  Sinus pressure comes and goes, ongoing for at least a year  Constant post nasal drip, clearing throat each morning  Used flonase  Zyrtec daily, all year round  Sudafed 12 hr as needed for sinus headache  No asthma or eczema  Has never had formal allergy testing    Concern for weigh gain   Up 22 lb since 2022  Previously did kick boxing, more cardio exercise  Now with limited mobility due to OA  Walking 2 miles but experiences, left lateral pain pain down to foot  Asking about whether she meets criteria to take a weight loss medication such as Wegovy  No hx of diabetes  Body mass index is 28.77 kg/m .      Lateral hip pain  Pain over lateral hips, doing a home exercise program.   Worse after prolonged sitting, standing  Sometimes painful to lay on her side.     PMH, PSH, FH, medications, allergies and immunizations are updated this visit.      Social  , 3 children, 12 grandchildren     HABITS:  Tob: never  ETOH: rare  Calcium: 1+ supplement Ca 400 mg/D   Caffeine  0-1 per day  Exercise:  walking, will try swimming     OB/GYN HISTORY:  LMP: postmenopausal  G 3   P 3   A 0  Self Breast exam:  Yes, no concerns      Current Outpatient Medications   Medication Sig Dispense Refill    alendronate (FOSAMAX) 70 MG tablet Take 1 tablet (70 mg) by mouth every 7 days 12 tablet 3    amoxicillin (AMOXIL) 500 MG capsule Take 4 capsules 60 min prior to dental visit 4 capsule 1    atorvastatin (LIPITOR) 10 MG tablet Take 1 tablet (10 mg) by mouth daily 90 tablet 3    calcium carbonate 600 mg-vitamin D 400 units (CALTRATE) 600-400 MG-UNIT per tablet Take 1 tablet by mouth 2 times daily 180 tablet 3    celecoxib (CELEBREX) 200 MG capsule Take 1 capsule (200 mg) by mouth daily 90 capsule 3    cetirizine (ZYRTEC) 10 MG tablet Take 1 tablet (10 mg) by mouth daily 90 tablet 3    DULoxetine (CYMBALTA) 30 MG capsule        "fluticasone (FLONASE) 50 MCG/ACT nasal spray 2 sprays      metroNIDAZOLE (METROGEL) 1 % gel       Omega-3 Fatty Acids (FISH OIL PO)       tretinoin (RETIN-A) 0.1 % cream Apply QHS to face for rosacea      VITAMIN D PO Take 4,000 Units by mouth daily       Allergies   Allergen Reactions    Cheese     Fentanyl Other (See Comments)     May have caused pt. To pass out after cataract     Latex Dermatitis    Methohexital Other (See Comments)     May have caused pt. To pass out after cataract     Midazolam Other (See Comments)     May have caused pt. To pass out after cataract surgery    Seasonal Allergies          ROS  CONSTITUTIONAL:NEGATIVE for fever, chills, +20 lb weight gain, see above  INTEGUMENTARY/SKIN: NEGATIVE for worrisome rashes, moles or lesions  EYES: NEGATIVE for vision changes or irritation  ENT/MOUTH: NEGATIVE for ear, mouth and throat problems, +seasonal allergy and sinus concerns, see above  RESP:NEGATIVE for significant cough or SOB  CV: NEGATIVE for chest pain, palpitations, CERVANTES, orthopnea, PND  or peripheral edema  Breast: bilateral breast implants, +family hx of breast cancer  GI: NEGATIVE for nausea, abdominal pain, heartburn, or change in bowel habits  :NEGATIVE for frequency, dysuria, or hematuria  MUSCULOSKELETAL:+ back pain, hx of sciatica, hip, knee and foot pain  NEURO: NEGATIVE for weakness, dizziness or paresthesias  ENDOCRINE: NEGATIVE for polyuria/dipsia,  temperature intolerance, skin/hair changes, osteopenia, thyroid nodule as above  HEME/ALLERGY/IMMUNE: NEGATIVE for bleeding problems  PSYCHIATRIC: NEGATIVE for changes in mood or affect    EXAM  /82   Pulse 108   Temp 97.3  F (36.3  C) (Skin)   Resp 15   Ht 1.68 m (5' 6.14\")   Wt 81.2 kg (179 lb)   SpO2 96%   BMI 28.77 kg/m    GENERAL APPEARANCE: Alert, pleasant, NAD  EYES: PERRL, EOMI, conjunctiva clear  HENT: TM normal bilaterally. Nose and mouth without lesions  NECK: no adenopathy, thyroid normal to palpation  RESP: " lungs clear to auscultation bilaterally  CV: regular rate and rhythm, normal S1 S2, no murmur, no carotid bruits  ABDOMEN: soft, nontender, without HSM or masses. Bowel sounds normal  MS: + point tenderness with palpation over lateral hips and IT bands  SKIN: no suspicious lesions or rashes  NEURO: Normal strength and tone, sensory exam grossly normal, DTR normoreflexive in upper and lower extremities  PSYCH: mentation appears normal. and affect normal/bright.  EXT: no peripheral edema, pedal pulses palpable    Assessment:  (Z00.00) Encounter for Medicare annual wellness exam  (primary encounter diagnosis)  Comment: 73 year old women in good health  Plan: Today we discussed ways to maintain a healthy lifestyle with sensible eating, regular exercise and self cares. We dicussed calcium and Vitamin D intake, vaccinations and preventive health screens.        (E78.5) Hyperlipidemia LDL goal <130  Comment: on atorvastatin ,numbers in target range  Recent Labs   Lab Test 04/26/24  0927 04/24/23  1343 12/14/21  1525 12/14/20  1356 02/10/20  1431   CHOL 183 181   < > 193.0 174.0   HDL 73 68   < > 80.0 68.0   LDL 98 102*   < > 104.0 95.0   TRIG 58 55   < > 45.0 57.0   CHOLHDLRATIO  --   --   --  2.4 2.6    < > = values in this interval not displayed.   Plan: Lipid panel reflex to direct LDL Fasting,         atorvastatin (LIPITOR) 10 MG tablet,         Comprehensive metabolic panel            (Z13.1) Encounter for screening examination for impaired glucose regulation and diabetes mellitus  Comment: fasting specimen      Latest Ref Rng & Units 4/26/2024     9:27 AM   Glucose Values   Glucose 70 - 99 mg/dL 104      (Z23) Need for vaccination  Comment: eligible for COVID booster  Plan: COVID-19 12+ (2023-24) (MODERNA)        given    (M15.8) Other osteoarthritis involving multiple joints  Comment: uses Celebrex to treat pain, along with duloxetine, no stomach upset  Plan: celecoxib (CELEBREX) 200 MG capsule,          Comprehensive metabolic panel        Refilled medication , monitor for GI irritation    (M85.88) Osteopenia of lumbar spine  Comment: on Alendronate since 2020, anniversary 2/2025  Plan: alendronate (FOSAMAX) 70 MG tablet        Refilled medication for one year. If DEX continues to look good this year, then may consider holiday with surveillance after 2/2025    (Z12.11) Screen for colon cancer  Comment: routine screening is due  Plan: Colonoscopy Screening  Referral        Referral is entered    (H91.93) Bilateral change in hearing  Comment: requesting audiology referral  Plan: Adult Audiology  Referral        given    (Z12.31) Visit for screening mammogram  Comment: history of breast implants  Plan: MA Screen Bilateral w/Evaristo        Referral is entered    (J30.2) Seasonal allergic rhinitis, unspecified trigger  Comment: reports year round symptoms with mild relief when using zyrtec, nasal spray and sudafed  Plan: Adult Allergy/Asthma  Referral        Referral placed to allergist for consultation    (E28.39) Estrogen deficiency  Comment: due for routine DEXA  Plan: DX Bone Density          (R63.5) Weight gain  Comment: 20 lb weight gain in the past year, likely due to less physical activity  Plan: Body mass index is 28.77 kg/m .  She does not currently meet criteria to start Wegovy, may meet with our nutritionist, information give, discussed calorie counting and keeping intake to <1500 calories /day will help.      Sherlyn Almanzar MD  Internal Medicine/Pediatrics      Answers submitted by the patient for this visit:  Patient Health Questionnaire (Submitted on 4/26/2024)  If you checked off any problems, how difficult have these problems made it for you to do your work, take care of things at home, or get along with other people?: Not difficult at all  PHQ9 TOTAL SCORE: 1  XIOMARA-7 (Submitted on 4/26/2024)  XIOMARA 7 TOTAL SCORE: 0

## 2024-04-27 PROBLEM — Z78.0 POSTMENOPAUSAL STATUS: Status: RESOLVED | Noted: 2021-12-08 | Resolved: 2024-04-27

## 2024-04-27 PROBLEM — Z79.52 ON CORTICOSTEROID THERAPY: Status: RESOLVED | Noted: 2023-05-03 | Resolved: 2024-04-27

## 2024-04-27 PROBLEM — M85.9 LOW BONE DENSITY: Status: RESOLVED | Noted: 2020-02-26 | Resolved: 2024-04-27

## 2024-05-03 ENCOUNTER — TELEPHONE (OUTPATIENT)
Dept: GASTROENTEROLOGY | Facility: CLINIC | Age: 74
End: 2024-05-03

## 2024-05-03 ENCOUNTER — OFFICE VISIT (OUTPATIENT)
Dept: AUDIOLOGY | Facility: CLINIC | Age: 74
End: 2024-05-03
Payer: MEDICARE

## 2024-05-03 DIAGNOSIS — H91.93 BILATERAL CHANGE IN HEARING: ICD-10-CM

## 2024-05-03 DIAGNOSIS — H90.3 SENSORY HEARING LOSS, BILATERAL: Primary | ICD-10-CM

## 2024-05-03 PROCEDURE — 92550 TYMPANOMETRY & REFLEX THRESH: CPT | Performed by: AUDIOLOGIST

## 2024-05-03 PROCEDURE — 92557 COMPREHENSIVE HEARING TEST: CPT | Performed by: AUDIOLOGIST

## 2024-05-03 NOTE — TELEPHONE ENCOUNTER
"Endoscopy Scheduling Screen    Have you had a positive Covid test in the last 14 days?  No    What is your communication preference for Instructions and/or Bowel Prep?   MyChart    What insurance is in the chart?  Other:  Medicare    Ordering/Referring Provider:   ESA BONILLA        (If ordering provider performs procedure, schedule with ordering provider unless otherwise instructed. )    BMI: Estimated body mass index is 28.77 kg/m  as calculated from the following:    Height as of 4/26/24: 1.68 m (5' 6.14\").    Weight as of 4/26/24: 81.2 kg (179 lb).     Sedation Ordered  moderate sedation.   If patient BMI > 50 do not schedule in [facility].    If patient BMI > 45 do not schedule at [facility].    Are you taking methadone or Suboxone?  No    Have you had difficulties, pain, or discomfort during past endoscopy procedures?  No    Are you taking any prescription medications for pain 3 or more times per week?   NO, No RN review required.    Do you have a history of malignant hyperthermia?  No    (Females) Are you currently pregnant?   No     Have you been diagnosed or told you have pulmonary hypertension?   No    Do you have an LVAD?  No    Have you been told you have moderate to severe sleep apnea?  No    Have you been told you have COPD, asthma, or any other lung disease?  No    Do you have any heart conditions?  No     Have you ever had or are you waiting for an organ transplant?  No. Continue scheduling, no site restrictions.    Have you had a stroke or transient ischemic attack (TIA aka \"mini stroke\" in the last 6 months?   No    Have you been diagnosed with or been told you have cirrhosis of the liver?   No    Are you currently on dialysis?   No    Do you need assistance transferring?   No    BMI: Estimated body mass index is 28.77 kg/m  as calculated from the following:    Height as of 4/26/24: 1.68 m (5' 6.14\").    Weight as of 4/26/24: 81.2 kg (179 lb).     Is patients BMI > 40 and scheduling location " [facility]?  No    Do you take an injectable medication for weight loss or diabetes (excluding insulin)?  No    Do you take the medication Naltrexone?  No    Do you take blood thinners?  No       Prep   Are you currently on dialysis or do you have chronic kidney disease?  No    Do you have a diagnosis of diabetes?  No    Do you have a diagnosis of cystic fibrosis (CF)?  No    On a regular basis do you go 3 -5 days between bowel movements?  No    BMI > 40?  No    Preferred Pharmacy:    [id number] - [facility] [address] [place] [address]  [address] [facility] [address]  Phone: [phone] Fax: [phone]      Final Scheduling Details     Procedure scheduled  Colonoscopy    Surgeon:  Balaji     Date of procedure:  6/18     Pre-OP / PAC:   No - Not required for this site.    Location  [facility] - [facility] - Per order.    Sedation   Moderate Sedation - Per order.      Patient Reminders:   You will receive a call from a Nurse to review instructions and health history.  This assessment must be completed prior to your procedure.  Failure to complete the Nurse assessment may result in the procedure being cancelled.      On the day of your procedure, please designate an adult(s) who can drive you home stay with you for the next 24 hours. The medicines used in the exam will make you sleepy. You will not be able to drive.      You cannot take public transportation, ride share services, or non-medical taxi service without a responsible caregiver.  Medical transport services are allowed with the requirement that a responsible caregiver will receive you at your destination.  We require that drivers and caregivers are confirmed prior to your procedure.

## 2024-05-03 NOTE — PROGRESS NOTES
AUDIOLOGY REPORT    SUBJECTIVE:  Yanely Jon is a 73 year old female who was seen in the Audiology Clinic at the [facility] for audiologic evaluation, referred by Sherlyn Almanzar M.D.  The patient reports a possible decrease in hearing. Has the TV louder and her children report they have concerns. Denies pain,drainage, dizziness, and tinnitus. No previous ear surgeries. 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  }    OBJECTIVE:  Abuse Screening:  Do you feel unsafe at home or work/school? No  Do you feel threatened by someone? No  Does anyone try to keep you from having contact with others, or doing things outside of your home? No  Physical signs of abuse present? No     Fall Risk Screen:  1. Have you fallen two or more times in the past year? No  2. Have you fallen and had an injury in the past year? No    Otoscopic exam indicates ears are clear of cerumen bilaterally     Pure Tone Thresholds assessed using conventional audiometry with good  reliability from 250-8000 Hz bilaterally using insert earphones and circumaural headphones     RIGHT:  normal sloping to moderate-severe rising to mild sensorineural hearing loss     LEFT:    normal sloping to moderate-severe rising to mild sensorineural hearing loss     Tympanogram:    RIGHT: normal eardrum mobility    LEFT:   normal eardrum mobility    Reflexes (reported by stimulus ear):  RIGHT: Ipsilateral is present at normal levels  RIGHT: Contralateral is present at normal levels  LEFT:   Ipsilateral is present at normal levels  LEFT:   Contralateral is present at an elevated level      Speech Reception Threshold:    RIGHT: 20 dB HL    LEFT:   25 dB HL  Word Recognition Score:     RIGHT: 64% at 80 dB HL using NU-6 recorded word list.    LEFT:   92% at 80 dB HL using NU-6 recorded word list.      ASSESSMENT:   .Normal sloping to moderate-severe rising to mild sensorineural  hearing loss .  A significant difference between word recognition testing is noted between ears. Patient notes the hairdryer was on her right hand side more often. Today s results were discussed with the patient in detail.     PLAN:  Patient was counseled regarding hearing loss and impact on communication. Patient is a good candidate for amplification at this time.  It is recommended that the patient return for a hearing aid consult. If new symptom arise or word rec asymmetry increases consider a referral to ENT services.  Please call this clinic with questions regarding these results or recommendations.        Yasmany Martino., [facility]  Licensed Audiologist  [place] #[id number]

## 2024-05-15 ENCOUNTER — OFFICE VISIT (OUTPATIENT)
Dept: ENDOCRINOLOGY | Facility: CLINIC | Age: 74
End: 2024-05-15
Payer: MEDICARE

## 2024-05-15 VITALS
OXYGEN SATURATION: 97 % | HEIGHT: 66 IN | DIASTOLIC BLOOD PRESSURE: 89 MMHG | SYSTOLIC BLOOD PRESSURE: 144 MMHG | WEIGHT: 184 LBS | BODY MASS INDEX: 29.57 KG/M2 | HEART RATE: 87 BPM

## 2024-05-15 DIAGNOSIS — Z92.3 PERSONAL HISTORY OF IRRADIATION, PRESENTING HAZARDS TO HEALTH: ICD-10-CM

## 2024-05-15 DIAGNOSIS — E04.1 THYROID NODULE: Primary | ICD-10-CM

## 2024-05-15 PROCEDURE — 99214 OFFICE O/P EST MOD 30 MIN: CPT

## 2024-05-15 PROCEDURE — G2211 COMPLEX E/M VISIT ADD ON: HCPCS

## 2024-05-15 ASSESSMENT — PAIN SCALES - GENERAL: PAINLEVEL: NO PAIN (0)

## 2024-05-15 NOTE — LETTER
5/15/2024       RE: Yanely Jon  [address]  [facility] [address]     Dear Colleague,    Thank you for referring your patient, Yanely Jon, to the [facility] ENDOCRINOLOG[facility] [place] at [facility]. Please see a copy of my visit note below.    Endocrinology note    Attending Assessment/Plan :     Multiple thyroid nodules, all subcm with perithyroidal adenopathy She has history of radiation exposure as a child which increases her risk  Next US 11/2024 or after and see me afterwards    perithyroidal adenopathy- benign cytology on bilateral lateral neck LNs 2020.    Next US 11/2024 or after and see me afterwards    Low bone density. Family history of hip fracture in her mother.      On alendronate since 2/2020.  Dr Almanzar is managing it    History of  corticosteroid injections for her back - this is a risk for the bone - as above.      Post menopausal    History of radiation exposure presenting health risks - Shoe store fluroscope exposure as a child approx age 4     The Longitudinal plan of care for nodular thyroid in the context of childhood radiation exposure was addressed during this visit. Due to added complexity of care, we will continue to support [place] , and the subsequent management of this condition(s) and with the ongoing continuity of care of this condition.      16_ minutes spent on the date of the encounter doing chart review, history and exam, documentation and further activities as noted above.    Olga Murry MD    Chief complaint: HISTORY OF PRESENT ILLNESS  Ashtyn presents today , along with , for follow up of multiple thyroid nodules, perithyroidal adenopathy and low bone density. I last saw her 5/2023. Since then, she had US in 11/2023.  I have again reviewed the images.  I had recommended repeat US one year following the test.  The DXA that was ordered for 5/4 or after has not been done,  is scheduled for June.   This problem with desynchronized tests and appts also happened last year. .      Thyroid nodule was lst seen on an MRI  Since then, she has had thyorid US x 3, on 12/2019, 9/10/2020 and 11/22/2021.   Ashtyn was exposed to the shoe store fluoroscope at [facility] around age 4 years.     She has been on alendronate since 2/2020., managed by  Dr Almanzar     We have the following labs:   10/6/16 HgbA1c 5.6%  12/11/19 TSH 2.58, creatinine 0.7, Ca 9.4, glucose 106, cholesterol 262,   2/26/2020 calcitonin < 2, TPO < 10, HgbA1c 5.7%  12/14/2020 glucose 95, Ca 9, creatinine 0.59  4/24/23 Ca 9.9, creatinine 0.6, glucose 103    I have reviewed images on pACS  11/21/23  thyroid US compared with 11/22/2021,  9/10/2020,  12/19-- as read by me again today   Right # 1 0.8 x 0.4 x 1.1 (0.18 CM3)anterior isthmus was right # 2 0.6 x 0.4 x 0.9  (0.11 CM3);  0.7 x 0.5 x 0.8 (0.14 CM3; 2019)  Left # 2  0.8 x 0.6 x 1.2 cm (0.29 CM3) solid hypoechoic anterior was left # 4 0.7 x 0.6 x 0.9 cm mixed (0.19 CM3);   0.7 x 0.7 x 0.7 hypoechoic;  0.7 x 0.6 x 0.7 cm (0.15 CM3) )  Left # 3 0.8 x 0.4 x 0.8 was ?  left # 5  0.6 x 0.3 x 0.6   Right superior posterior not seen ; was  1 x 0.6 x 1.1 was  1 x 0.6 x 1.1 cm anechoic (was 0.6 x 0.9 x 1.1 cm)  Right level 4 # 1 0.4 x 0.7 x 1 was  0.4 x 0.5 x was  0.9 x 0.5 x 0.7 cm FNAB 11/23/2-2- benign    Right level 7 # 1  0.7 x 0.5 x 0.7 cm was 1.0 x 0.6 x 1.1 cm;  1.3 x 0.7 x 1.1 cm  Left level 3 0.9 x 0.5 x 1.5 cm was 0.9 x 0.4 x 1.7 cm;    0.9 x 0.5 x 2 cm ; 0.8 x 0.4 x 1.7 cm)- FNAB 11/23/20- benign   Left level 7 # 1  1.2 x 0.5 x 0.8 cm was  1.2 x 0.4 x 1.2; 1.1 x 0.6 x 0.9 cm      REVIEW OF SYSTEMS    Rooster comb injection into the left knee, not steroids  Pain down the LLE from the back --doing a little better now   Swallow ok  Voice OK  Singing voice has gone to pot  Negative cardiac, respiratory, GI       Past Medical History  Past Medical History:   Diagnosis Date     Acrochordon     Atypical chest pain 1999    Normal stress test in 1999, performed for atypical chest pain    Benign nevus of skin     Numerous benign skin nevi    Biceps tendonitis     Bilateral bunions     Breast implant leak     Breast implants complicated by a leak. She then underwent replacement and repair.     Chronic fatigue     H/O urinary incontinence     Mild occasional female urinary incontinence which seems to be both urge and stress related.    History of bilateral cataract extraction     Hx of tubal ligation     Left hip pain     Occasional left hip pain due to osteoarthritis and/or trochanteric bursitis.    Lipoma of back 2014    Chronic lipoma of the left back, status post-surgical resection in January 2014    Low back pain     Occasional low back pain with radicular symptoms into the legs.    Osteoarthritis of knees, bilateral     Osteoarthritis of left hip     Osteopenia 09/13/2013    Postmenopausal status     Prediabetes 02/2020    Pubic bone pain     Musculoskeletal pubic bone pain due to a strain.    Rosacea     Seborrheic keratoses     Skin lesions     Atypical melanocytic lesions, status post multiple biopsies by Dr. Rivera in July of 2015. These biposies showed a junctional nevus with moderate atypia, which is also a dysplastic nevus and the final one was also a compound nevus, with mild atypia which is also a dysplastic nevus.  She was contacted by Dr. Rivera and was told to follow up with her again in July 2016, for re-evaluation of her skin.    Skin tag     Solar elastosis     Solar lentiginosis     Sternal fracture 2015    Mid-sternal fracture after blunt trauma while falling while running up stepsin May 2015. She may have also had some rib fractures as well. These healed with conservative management.    Symptomatic varicose veins of both lower extremities 2015    Lower extremity venous varicosities, intermittently symptomatic, status post evaluation with Dr. Alonso in General Surgery in the  summer of 2015.    Symptomatic varicose veins of both lower extremities     Status post injection and compression sclerotherapy    Syncope 2018    Urticaria     Questionable exercise-induced urticaria     Rosacea  Syncope 2018    Past Surgical History:   Procedure Laterality Date    APPENDECTOMY      BUNIONECTOMY Bilateral 10/2017    CATARACT IOL, RT/LT Right     COLONOSCOPY  2014    repeat in 10 yr    DENTAL SURGERY      MAMMOPLASTY AUGMENTATION Bilateral 1972    RHYTIDECTOMY (FACELIFT)  01/23/2014    neck    SCLEROTHERAPY  10/03/2014    varicose veins    SURGICAL PATHOLOGY EXAM  01/13/2014    lower back    TONSILLECTOMY      TOTAL KNEE ARTHROPLASTY Right 12/2021    TUBAL LIGATION         Medications    Current Outpatient Medications   Medication Sig Dispense Refill    alendronate (FOSAMAX) 70 MG tablet Take 1 tablet (70 mg) by mouth every 7 days 12 tablet 3    atorvastatin (LIPITOR) 10 MG tablet Take 1 tablet (10 mg) by mouth daily 90 tablet 3    calcium carbonate 600 mg-vitamin D 400 units (CALTRATE) 600-400 MG-UNIT per tablet Take 1 tablet by mouth 2 times daily 180 tablet 3    celecoxib (CELEBREX) 200 MG capsule Take 1 capsule (200 mg) by mouth daily 90 capsule 3    cetirizine (ZYRTEC) 10 MG tablet Take 1 tablet (10 mg) by mouth daily 90 tablet 3    DULoxetine (CYMBALTA) 30 MG capsule       metroNIDAZOLE (METROGEL) 1 % gel       Omega-3 Fatty Acids (FISH OIL PO)       tretinoin (RETIN-A) 0.1 % cream Apply QHS to face for rosacea      VITAMIN D PO Take 4,000 Units by mouth daily           Allergies  Allergies   Allergen Reactions    Cheese     Fentanyl Other (See Comments)     May have caused pt. To pass out after cataract     Methohexital Other (See Comments)     May have caused pt. To pass out after cataract     Midazolam Other (See Comments)     May have caused pt. To pass out after cataract surgery    Seasonal Allergies      Family History  family history includes Bladder Cancer (age of onset: 67) in her  "father; Breast Cancer (age of onset: 53) in her sister; Cerebrovascular Disease in her maternal aunt; Cerebrovascular Disease (age of onset: 95) in her mother; Heart Disease (age of onset: 50) in her brother; Hip fracture in her mother; Hyperlipidemia in her maternal aunt and maternal aunt; Osteoarthritis in her mother; Osteoporosis in her mother; Other - See Comments in her sister.    Social History  Social History     Tobacco Use    Smoking status: Never    Smokeless tobacco: Never   Vaping Use    Vaping status: Never Used   Substance Use Topics    Alcohol use: Yes     Comment: 0-1 per day    Drug use: No       Physical Exam  GENERAL no mask; pleasant;   BP (!) 144/89   Pulse 87   Ht 1.676 m (5' 6\")   Wt 83.5 kg (184 lb)   SpO2 97%   BMI 29.70 kg/m    SKIN: normal color, temperature, texture  HEENT: PER, no scleral icterus, eyelid retraction, stare, lid lag, proptosis or conjunctival injection.  .    NECK: supple.  No visible or palpable neck masses, cervical adenopathy, or goiter.   LUNGS: clear to auscultation bilaterally.   CARDIAC: RRR, S1, S2 without murmurs, rubs or gallops.    BACK: normal spinal contour.    NEURO: Alert, responds appropriately to questions,  moves all extremities, DTRs 0/4, gait normal, no tremor of the outstretched hand    DATA REVIEW      US THYROID 11/21/2023 9:39 AM  COMPARISON: Ultrasound 11/20/2021, 9/10/2020, 12/19/2019  HISTORY: Low bone density; Thyroid nodule; Cervical adenopathy;  Postmenopausal status  FINDINGS:   Thyroid parenchyma: heterogenous  The right lobe of the thyroid size: 3.8 x 1.6 x 1.5 cm  The left lobe of the thyroid size: 3.7 x 1.9 x 1.5 cm  The thyroid isthmus measures: 0.3 cm  Nodule 1:  Lobe: Right  Location: Superior  Size: 1.1 x 0.8 x 0.4 cm  Composition: Solid or almost completely solid (2 points)  Echogenicity: Hyperechoic or isoechoic (1 point)  Shape: Wider than tall (0 points)  Margin: Ill-defined (0 points)  Echogenic Foci: None or large comet " tail artifact (0 points)  Stability: Slight increase in size, previously 0.9 x 0.6 x 0.4 cm  (prior nodule #2)  TIRADS: TR3 (3 points)   Nodule 2:  Lobe: Left  Location: Inferior  Size: 1.2 x 0.8 x 0.6 cm  Composition: Solid or almost completely solid (2 points)  Echogenicity: Hypoechoic (2 points)  Shape: Wider than tall (0 points)  Margin: Ill-defined (0 points)  Echogenic Foci: None or large comet tail artifact (0 points)  Stability: Enlarging, previously 0.8 x 0.7 x 0.6 cm (prior nodule #4)  TIRADS: TR4 (4-6 points)   Nodule 3:  Lobe: Left  Location: Inferior  Size: 0.9 x 0.8 x 0.4 cm  Composition: Solid or almost completely solid (2 points)  Echogenicity: Hypoechoic (2 points)  Shape: Wider than tall (0 points)  Margin: Ill-defined (0 points)  Echogenic Foci: None or large comet tail artifact (0 points)  Stability: Enlarging, previously 0.6 x 0.6 x 0.3 cm (prior nodule #5)  TIRADS: TR4 (4-6 points)                                                  Impression: Multiple thyroid nodules as described including 1.2 cm TR 4 nodule#2  in the left lobe, recommend follow-up in one year  ACR TI-RADS recommendations  TR2 (2 points) & TR1 (0 points) -No FNA or follow-up  TR3 (3 points) - FNA if ? 2.5cm, follow-up if 1.5 -2.4 cm in 1, 3 and 5 years  TR4 (4-6 points) - FNA if ? 1.5cm, follow-up if 1 -1.4 cm in 1, 2, 3and 5 years  TR5 (?7 points) - FNA if ? 1cm, follow-up if 0.5 -0.9 cm every year for 5 years   I have personally reviewed the examination and initial interpretation and I agree with the findings.  KERMIT AC MD     EXAMINATION: US HEAD NECK SOFT TISSUE, 11/21/2023 9:34 AM   COMPARISON: 11/20/2021  HISTORY: Lobe low-density, thyroid nodule, cervical adenopathy,postmenopausal status  FINDINGS:   Lymph nodes are measured bilaterally with measurements given in transverse x AP x craniocaudal dimensions as follows:  Right:  Level 2: 1.3 x 0.5 x 1.2 cm oval shaped node with fatty hilum, likely reactive. Not  significantly changed from prior ultrasound.  Level 3: No enlarged or suspicious lymph nodes.  Level 4: 0.4 x 0.7 x 1.0 cm oval shaped lymph node with indistinct fatty hilum fatty hilum, slightly enlarged compared to prior  ultrasound from 11/22/2021uncertain, if secondary to difference in measurement technique..  Level 5: No enlarged or suspicious lymph nodes.  Level 6: No enlarged or suspicious lymph nodes.  Level 7: 0.7 x 0.5 x 0.7 cm oval shaped node with fatty hilum slight decrease in size though uncertain if secondary to difference in measurement technique..  Left:  Level 2:   Lymph node #1: 1.0 x 0.7 x 1.5 cm reniform shaped lymph node with fatty hilum, not significantly changed, likely reactive  Lymph nodes# 2:  0.4 x 0.6 x 1.2 cm hypoechoic node with small/absent fatty hilum, not significantly changed.  Level 3: Unchanged 0.9 x 0.5 x 1.5 cm hypoechoic node with preserved fatty hilum, not significantly changed, likely reactive.  Level 4: No enlarged or suspicious lymph nodes.  Level 5: No enlarged or suspicious lymph nodes.  Level 6: No enlarged or suspicious lymph nodes.  Level 7: 1.2 x 0.5 x 0.8 cm reniform node with preserved fatty hilum. Not significantly changed.                                                          IMPRESSION: Soft tissue neck ultrasound with lymph nodes measurement as described above.. No new suspicious lymphadenopathy.  I have personally reviewed the examination and initial interpretation and I agree with the findings.  MD Olga MCKEON MD

## 2024-05-15 NOTE — PROGRESS NOTES
Endocrinology note    Attending Assessment/Plan :     Multiple thyroid nodules, all subcm with perithyroidal adenopathy She has history of radiation exposure as a child which increases her risk  Next US 11/2024 or after and see me afterwards    perithyroidal adenopathy- benign cytology on bilateral lateral neck LNs 2020.    Next US 11/2024 or after and see me afterwards    Low bone density. Family history of hip fracture in her mother.      On alendronate since 2/2020.  Dr Almanzar is managing it    History of  corticosteroid injections for her back - this is a risk for the bone - as above.      Post menopausal    History of radiation exposure presenting health risks - Shoe store fluroscope exposure as a child approx age 4     The Longitudinal plan of care for nodular thyroid in the context of childhood radiation exposure was addressed during this visit. Due to added complexity of care, we will continue to support Virginia , and the subsequent management of this condition(s) and with the ongoing continuity of care of this condition.      16_ minutes spent on the date of the encounter doing chart review, history and exam, documentation and further activities as noted above.    Olga Murry MD    Chief complaint: HISTORY OF PRESENT ILLNESS  Ashtyn presents today , along with , for follow up of multiple thyroid nodules, perithyroidal adenopathy and low bone density. I last saw her 5/2023. Since then, she had US in 11/2023.  I have again reviewed the images.  I had recommended repeat US one year following the test.  The DXA that was ordered for 5/4 or after has not been done, is scheduled for June.   This problem with desynchronized tests and appts also happened last year. .      Thyroid nodule was lst seen on an MRI  Since then, she has had thyorid US x 3, on 12/2019, 9/10/2020 and 11/22/2021.   Ashtyn was exposed to the shoe store fluoroscope at [facility] around age 4 years.     She has been on alendronate  since 2/2020., managed by  Dr Almanzar     We have the following labs:   10/6/16 HgbA1c 5.6%  12/11/19 TSH 2.58, creatinine 0.7, Ca 9.4, glucose 106, cholesterol 262,   2/26/2020 calcitonin < 2, TPO < 10, HgbA1c 5.7%  12/14/2020 glucose 95, Ca 9, creatinine 0.59  4/24/23 Ca 9.9, creatinine 0.6, glucose 103    I have reviewed images on pACS  11/21/23  thyroid US compared with 11/22/2021,  9/10/2020,  12/19-- as read by me again today   Right # 1 0.8 x 0.4 x 1.1 (0.18 CM3)anterior isthmus was right # 2 0.6 x 0.4 x 0.9  (0.11 CM3);  0.7 x 0.5 x 0.8 (0.14 CM3; 2019)  Left # 2  0.8 x 0.6 x 1.2 cm (0.29 CM3) solid hypoechoic anterior was left # 4 0.7 x 0.6 x 0.9 cm mixed (0.19 CM3);   0.7 x 0.7 x 0.7 hypoechoic;  0.7 x 0.6 x 0.7 cm (0.15 CM3) )  Left # 3 0.8 x 0.4 x 0.8 was ?  left # 5  0.6 x 0.3 x 0.6   Right superior posterior not seen ; was  1 x 0.6 x 1.1 was  1 x 0.6 x 1.1 cm anechoic (was 0.6 x 0.9 x 1.1 cm)  Right level 4 # 1 0.4 x 0.7 x 1 was  0.4 x 0.5 x was  0.9 x 0.5 x 0.7 cm FNAB 11/23/2-2- benign    Right level 7 # 1  0.7 x 0.5 x 0.7 cm was 1.0 x 0.6 x 1.1 cm;  1.3 x 0.7 x 1.1 cm  Left level 3 0.9 x 0.5 x 1.5 cm was 0.9 x 0.4 x 1.7 cm;    0.9 x 0.5 x 2 cm ; 0.8 x 0.4 x 1.7 cm)- FNAB 11/23/20- benign   Left level 7 # 1  1.2 x 0.5 x 0.8 cm was  1.2 x 0.4 x 1.2; 1.1 x 0.6 x 0.9 cm      REVIEW OF SYSTEMS    Rooster comb injection into the left knee, not steroids  Pain down the LLE from the back --doing a little better now   Swallow ok  Voice OK  Singing voice has gone to pot  Negative cardiac, respiratory, GI       Past Medical History  Past Medical History:   Diagnosis Date    Acrochordon     Atypical chest pain 1999    Normal stress test in 1999, performed for atypical chest pain    Benign nevus of skin     Numerous benign skin nevi    Biceps tendonitis     Bilateral bunions     Breast implant leak     Breast implants complicated by a leak. She then underwent replacement and repair.     Chronic fatigue     H/O  urinary incontinence     Mild occasional female urinary incontinence which seems to be both urge and stress related.    History of bilateral cataract extraction     Hx of tubal ligation     Left hip pain     Occasional left hip pain due to osteoarthritis and/or trochanteric bursitis.    Lipoma of back 2014    Chronic lipoma of the left back, status post-surgical resection in January 2014    Low back pain     Occasional low back pain with radicular symptoms into the legs.    Osteoarthritis of knees, bilateral     Osteoarthritis of left hip     Osteopenia 09/13/2013    Postmenopausal status     Prediabetes 02/2020    Pubic bone pain     Musculoskeletal pubic bone pain due to a strain.    Rosacea     Seborrheic keratoses     Skin lesions     Atypical melanocytic lesions, status post multiple biopsies by Dr. Rivera in July of 2015. These biposies showed a junctional nevus with moderate atypia, which is also a dysplastic nevus and the final one was also a compound nevus, with mild atypia which is also a dysplastic nevus.  She was contacted by Dr. Rivera and was told to follow up with her again in July 2016, for re-evaluation of her skin.    Skin tag     Solar elastosis     Solar lentiginosis     Sternal fracture 2015    Mid-sternal fracture after blunt trauma while falling while running up stepsin May 2015. She may have also had some rib fractures as well. These healed with conservative management.    Symptomatic varicose veins of both lower extremities 2015    Lower extremity venous varicosities, intermittently symptomatic, status post evaluation with Dr. Alonso in General Surgery in the summer of 2015.    Symptomatic varicose veins of both lower extremities     Status post injection and compression sclerotherapy    Syncope 2018    Urticaria     Questionable exercise-induced urticaria     Rosacea  Syncope 2018    Past Surgical History:   Procedure Laterality Date    APPENDECTOMY      BUNIONECTOMY Bilateral 10/2017     CATARACT IOL, RT/LT Right     COLONOSCOPY  2014    repeat in 10 yr    DENTAL SURGERY      MAMMOPLASTY AUGMENTATION Bilateral 1972    RHYTIDECTOMY (FACELIFT)  01/23/2014    neck    SCLEROTHERAPY  10/03/2014    varicose veins    SURGICAL PATHOLOGY EXAM  01/13/2014    lower back    TONSILLECTOMY      TOTAL KNEE ARTHROPLASTY Right 12/2021    TUBAL LIGATION         Medications    Current Outpatient Medications   Medication Sig Dispense Refill    alendronate (FOSAMAX) 70 MG tablet Take 1 tablet (70 mg) by mouth every 7 days 12 tablet 3    atorvastatin (LIPITOR) 10 MG tablet Take 1 tablet (10 mg) by mouth daily 90 tablet 3    calcium carbonate 600 mg-vitamin D 400 units (CALTRATE) 600-400 MG-UNIT per tablet Take 1 tablet by mouth 2 times daily 180 tablet 3    celecoxib (CELEBREX) 200 MG capsule Take 1 capsule (200 mg) by mouth daily 90 capsule 3    cetirizine (ZYRTEC) 10 MG tablet Take 1 tablet (10 mg) by mouth daily 90 tablet 3    DULoxetine (CYMBALTA) 30 MG capsule       metroNIDAZOLE (METROGEL) 1 % gel       Omega-3 Fatty Acids (FISH OIL PO)       tretinoin (RETIN-A) 0.1 % cream Apply QHS to face for rosacea      VITAMIN D PO Take 4,000 Units by mouth daily           Allergies  Allergies   Allergen Reactions    Cheese     Fentanyl Other (See Comments)     May have caused pt. To pass out after cataract     Methohexital Other (See Comments)     May have caused pt. To pass out after cataract     Midazolam Other (See Comments)     May have caused pt. To pass out after cataract surgery    Seasonal Allergies      Family History  family history includes Bladder Cancer (age of onset: 67) in her father; Breast Cancer (age of onset: 53) in her sister; Cerebrovascular Disease in her maternal aunt; Cerebrovascular Disease (age of onset: 95) in her mother; Heart Disease (age of onset: 50) in her brother; Hip fracture in her mother; Hyperlipidemia in her maternal aunt and maternal aunt; Osteoarthritis in her mother; Osteoporosis in  "her mother; Other - See Comments in her sister.    Social History  Social History     Tobacco Use    Smoking status: Never    Smokeless tobacco: Never   Vaping Use    Vaping status: Never Used   Substance Use Topics    Alcohol use: Yes     Comment: 0-1 per day    Drug use: No       Physical Exam  GENERAL no mask; pleasant;   BP (!) 144/89   Pulse 87   Ht 1.676 m (5' 6\")   Wt 83.5 kg (184 lb)   SpO2 97%   BMI 29.70 kg/m    SKIN: normal color, temperature, texture  HEENT: PER, no scleral icterus, eyelid retraction, stare, lid lag, proptosis or conjunctival injection.  .    NECK: supple.  No visible or palpable neck masses, cervical adenopathy, or goiter.   LUNGS: clear to auscultation bilaterally.   CARDIAC: RRR, S1, S2 without murmurs, rubs or gallops.    BACK: normal spinal contour.    NEURO: Alert, responds appropriately to questions,  moves all extremities, DTRs 0/4, gait normal, no tremor of the outstretched hand    DATA REVIEW      US THYROID 11/21/2023 9:39 AM  COMPARISON: Ultrasound 11/20/2021, 9/10/2020, 12/19/2019  HISTORY: Low bone density; Thyroid nodule; Cervical adenopathy;  Postmenopausal status  FINDINGS:   Thyroid parenchyma: heterogenous  The right lobe of the thyroid size: 3.8 x 1.6 x 1.5 cm  The left lobe of the thyroid size: 3.7 x 1.9 x 1.5 cm  The thyroid isthmus measures: 0.3 cm  Nodule 1:  Lobe: Right  Location: Superior  Size: 1.1 x 0.8 x 0.4 cm  Composition: Solid or almost completely solid (2 points)  Echogenicity: Hyperechoic or isoechoic (1 point)  Shape: Wider than tall (0 points)  Margin: Ill-defined (0 points)  Echogenic Foci: None or large comet tail artifact (0 points)  Stability: Slight increase in size, previously 0.9 x 0.6 x 0.4 cm  (prior nodule #2)  TIRADS: TR3 (3 points)   Nodule 2:  Lobe: Left  Location: Inferior  Size: 1.2 x 0.8 x 0.6 cm  Composition: Solid or almost completely solid (2 points)  Echogenicity: Hypoechoic (2 points)  Shape: Wider than tall (0 " points)  Margin: Ill-defined (0 points)  Echogenic Foci: None or large comet tail artifact (0 points)  Stability: Enlarging, previously 0.8 x 0.7 x 0.6 cm (prior nodule #4)  TIRADS: TR4 (4-6 points)   Nodule 3:  Lobe: Left  Location: Inferior  Size: 0.9 x 0.8 x 0.4 cm  Composition: Solid or almost completely solid (2 points)  Echogenicity: Hypoechoic (2 points)  Shape: Wider than tall (0 points)  Margin: Ill-defined (0 points)  Echogenic Foci: None or large comet tail artifact (0 points)  Stability: Enlarging, previously 0.6 x 0.6 x 0.3 cm (prior nodule #5)  TIRADS: TR4 (4-6 points)                                                  Impression: Multiple thyroid nodules as described including 1.2 cm TR 4 nodule#2  in the left lobe, recommend follow-up in one year  ACR TI-RADS recommendations  TR2 (2 points) & TR1 (0 points) -No FNA or follow-up  TR3 (3 points) - FNA if ? 2.5cm, follow-up if 1.5 -2.4 cm in 1, 3 and 5 years  TR4 (4-6 points) - FNA if ? 1.5cm, follow-up if 1 -1.4 cm in 1, 2, 3and 5 years  TR5 (?7 points) - FNA if ? 1cm, follow-up if 0.5 -0.9 cm every year for 5 years   I have personally reviewed the examination and initial interpretation and I agree with the findings.  KERMIT AC MD     EXAMINATION: US HEAD NECK SOFT TISSUE, 11/21/2023 9:34 AM   COMPARISON: 11/20/2021  HISTORY: Lobe low-density, thyroid nodule, cervical adenopathy,postmenopausal status  FINDINGS:   Lymph nodes are measured bilaterally with measurements given in transverse x AP x craniocaudal dimensions as follows:  Right:  Level 2: 1.3 x 0.5 x 1.2 cm oval shaped node with fatty hilum, likely reactive. Not significantly changed from prior ultrasound.  Level 3: No enlarged or suspicious lymph nodes.  Level 4: 0.4 x 0.7 x 1.0 cm oval shaped lymph node with indistinct fatty hilum fatty hilum, slightly enlarged compared to prior  ultrasound from 11/22/2021uncertain, if secondary to difference in measurement technique..  Level 5: No  enlarged or suspicious lymph nodes.  Level 6: No enlarged or suspicious lymph nodes.  Level 7: 0.7 x 0.5 x 0.7 cm oval shaped node with fatty hilum slight decrease in size though uncertain if secondary to difference in measurement technique..  Left:  Level 2:   Lymph node #1: 1.0 x 0.7 x 1.5 cm reniform shaped lymph node with fatty hilum, not significantly changed, likely reactive  Lymph nodes# 2:  0.4 x 0.6 x 1.2 cm hypoechoic node with small/absent fatty hilum, not significantly changed.  Level 3: Unchanged 0.9 x 0.5 x 1.5 cm hypoechoic node with preserved fatty hilum, not significantly changed, likely reactive.  Level 4: No enlarged or suspicious lymph nodes.  Level 5: No enlarged or suspicious lymph nodes.  Level 6: No enlarged or suspicious lymph nodes.  Level 7: 1.2 x 0.5 x 0.8 cm reniform node with preserved fatty hilum. Not significantly changed.                                                          IMPRESSION: Soft tissue neck ultrasound with lymph nodes measurement as described above.. No new suspicious lymphadenopathy.  I have personally reviewed the examination and initial interpretation and I agree with the findings.  KERMIT AC MD

## 2024-05-15 NOTE — PATIENT INSTRUCTIONS
You should schedule your appointments to follow tests, rather than vice versa    Next thyroid US in 6-12 months, just prior to next appt

## 2024-06-10 ENCOUNTER — TELEPHONE (OUTPATIENT)
Dept: GASTROENTEROLOGY | Facility: CLINIC | Age: 74
End: 2024-06-10
Payer: MEDICARE

## 2024-06-10 NOTE — TELEPHONE ENCOUNTER
Caller: Writer to patient     Reason for Reschedule/Cancellation   (please be detailed, any staff messages or encounters to note?): Patient has a fentanyl allergy and needs MAC.      Prior to reschedule please review:  Ordering Provider:     Sherlyn Almanzar MD in [facility]     Sedation Determined: mac  Does patient have any ASC Exclusions, please identify?: n      Notes on Cancelled Procedure:  Procedure: Lower Endoscopy [Colonoscopy]   Date: 06/18/2024  Location: [facility]; [address] [place] [address], [address] Floor, [place] [address]  Surgeon: Balaji      Rescheduled: No, CASE IN DEPOT        Did you cancel or rescheduled an EUS procedure? No.

## 2024-06-10 NOTE — TELEPHONE ENCOUNTER
Rescheduled: Yes,   Procedure: Lower Endoscopy [Colonoscopy]    Date: 10/3   Location: [facility]; [address] [place] [address], [address] Floor, [place] [address]   Surgeon: KELBY   Sedation Level Scheduled  MAC ,  Reason for Sedation Level ALLERGIES   Instructions updated and sent: Y     Does patient need PAC or Pre -Op Rescheduled? : N       Did you cancel or rescheduled an EUS procedure? No.

## 2024-06-14 ENCOUNTER — ANCILLARY PROCEDURE (OUTPATIENT)
Dept: MAMMOGRAPHY | Facility: CLINIC | Age: 74
End: 2024-06-14
Attending: INTERNAL MEDICINE
Payer: MEDICARE

## 2024-06-14 ENCOUNTER — ANCILLARY PROCEDURE (OUTPATIENT)
Dept: BONE DENSITY | Facility: CLINIC | Age: 74
End: 2024-06-14
Attending: INTERNAL MEDICINE
Payer: MEDICARE

## 2024-06-14 DIAGNOSIS — E28.39 ESTROGEN DEFICIENCY: ICD-10-CM

## 2024-06-14 DIAGNOSIS — Z12.31 VISIT FOR SCREENING MAMMOGRAM: ICD-10-CM

## 2024-06-14 PROCEDURE — 77063 BREAST TOMOSYNTHESIS BI: CPT | Performed by: RADIOLOGY

## 2024-06-14 PROCEDURE — 77080 DXA BONE DENSITY AXIAL: CPT

## 2024-06-14 PROCEDURE — 77067 SCR MAMMO BI INCL CAD: CPT | Performed by: RADIOLOGY

## 2024-08-01 ENCOUNTER — TRANSFERRED RECORDS (OUTPATIENT)
Dept: HEALTH INFORMATION MANAGEMENT | Facility: CLINIC | Age: 74
End: 2024-08-01
Payer: MEDICARE

## 2024-08-15 ENCOUNTER — TELEPHONE (OUTPATIENT)
Dept: ALLERGY | Facility: CLINIC | Age: 74
End: 2024-08-15
Payer: MEDICARE

## 2024-08-21 ENCOUNTER — MYC MEDICAL ADVICE (OUTPATIENT)
Dept: OTOLARYNGOLOGY | Facility: CLINIC | Age: 74
End: 2024-08-21
Payer: MEDICARE

## 2024-08-29 ENCOUNTER — OFFICE VISIT (OUTPATIENT)
Dept: AUDIOLOGY | Facility: CLINIC | Age: 74
End: 2024-08-29
Payer: MEDICARE

## 2024-08-29 DIAGNOSIS — H90.3 SENSORY HEARING LOSS, BILATERAL: Primary | ICD-10-CM

## 2024-08-29 PROCEDURE — 92591 PR HEARING AID EXAM BINAURAL: CPT | Performed by: AUDIOLOGIST

## 2024-08-29 NOTE — PROGRESS NOTES
AUDIOLOGY REPORT    SUBJECTIVE: Yanely Jon is a 74 year old female was seen in the Audiology Clinic at  [facility] on 8/29/24 to discuss concerns with hearing and functional communication difficulties. The patient was accompanied by their . Virginia has been seen previously on 1948, and results revealed a normal sloping to moderate, rising to mild sensorineural hearing loss bilaterally.  Virginia notes difficulty with communication in a variety of listening situations. Virginia understands she does not have insurance for hearing aids and today's appointment will be self-pay.    OBJECTIVE:  Patient is a hearing aid candidate. Patient would like to move forward with a hearing aid evaluation today. Therefore, the patient was presented with different options for amplification to help aid in communication. Discussed styles, levels of technology and monaural vs. binaural fitting.     The hearing aid(s) mutually chosen were:  OPTION 1:  Binaural: Phonak Audeo Life-50-R  COLOR: KTM Advance  BATTERY SIZE: rechargeable  EARMOLD/TIPS: medium closed  CANAL/ LENGTH: 3    OPTION 2:  Binaural: Phonak Audeo Life-70-R  COLOR: KTM Advance  BATTERY SIZE: rechargeable  EARMOLD/TIPS: medium closed  CANAL/ LENGTH: 3  ACCESSORY: Partner marina    ASSESSMENT:   Reviewed purchase information and warranty information with patient. The 45 day trial period was explained to patient. The patient was given a copy of the [place] Department of Health consumer brochure on purchasing hearing instruments. Patient risk factors have been provided to the patient in writing prior to the sale of the hearing aid per FDA regulation. The risk factors are also available in the User Instructional Booklet to be presented on the day of the hearing aid fitting.  Hearing aid evaluation completed.      PLAN: Virginia is scheduled to return in 2-3 weeks for a hearing aid fitting and programming. Patient  would like to think about her options and let me know if she would like to move forward. She is advised to let me know if the next week in order to keep her upcoming appointment as is. Purchase agreement will be completed on that date. Please contact this clinic with any questions or concerns.        Hien Martino, [facility]  Licensed Audiologist  [place] #[id number]

## 2024-09-23 ENCOUNTER — TELEPHONE (OUTPATIENT)
Dept: GASTROENTEROLOGY | Facility: CLINIC | Age: 74
End: 2024-09-23
Payer: MEDICARE

## 2024-09-23 DIAGNOSIS — Z12.11 SPECIAL SCREENING FOR MALIGNANT NEOPLASMS, COLON: Primary | ICD-10-CM

## 2024-09-23 RX ORDER — BISACODYL 5 MG/1
TABLET, DELAYED RELEASE ORAL
Qty: 4 TABLET | Refills: 0 | Status: SHIPPED | OUTPATIENT
Start: 2024-09-23

## 2024-09-23 NOTE — TELEPHONE ENCOUNTER
Pre visit planning completed.      Procedure details:    Patient scheduled for Colonoscopy on 10/3/24.     Arrival time: 1145. Procedure time 1245    Facility location: [facility]; [address] [place] [address], 5[address] Floor, [place] [address]. Check in location: 5th Floor.    Sedation type: MAC    Pre op exam needed? No.    Indication for procedure: Screening      Chart review:     Electronic implanted devices? No    Recent diagnosis of diverticulitis within the last 6 weeks? No      Medication review:    Diabetic? No    Anticoagulants? No    Weight loss medication/injectable? No GLP-1 medication per patient's medication list.  RN will verify with pre-assessment call.    Other medication HOLDING recommendations:  N/A      Prep for procedure:     Bowel prep recommendation: Standard Miralax  Due to: standard bowel prep.    Prep instructions sent via [employer]         Arlene Hastings RN  Endoscopy Procedure Pre Assessment RN  [phone] option 2

## 2024-09-23 NOTE — TELEPHONE ENCOUNTER
Pre assessment completed for upcoming procedure.      Procedure details:    Patient scheduled for Colonoscopy on 10/3/24.     Arrival time: 1145. Procedure time 1245     Facility location: [facility]; [address] [place] [address], [address] Floor, [place] [address]. Check in location: 5th Floor.     Sedation type: MAC     Pre op exam needed? No.    Designated  policy reviewed. Instructed to have someone stay 24 hours post procedure.       Medication review:    Weight loss medication/injectable? Yes. Semaglutide. Weekly dosing of medication.  HOLD 7 days before procedure.  Follow up with managing provider.       Prep for procedure:     Bowel prep recommendation: Extended Golytely.     Bowel prep prescription sent to [facility] [id number] [place] [facility] [place], [place] - [address] [facility]   Due to: GLP-1 agonist medication noted in chart.     Prep instructions sent via [employer]     Reviewed procedure prep instructions.     Patient verbalized understanding and had no questions or concerns at this time.        Arlene Hastings RN  Endoscopy Procedure Pre Assessment   [phone] option 2

## 2024-09-26 ENCOUNTER — OFFICE VISIT (OUTPATIENT)
Dept: AUDIOLOGY | Facility: CLINIC | Age: 74
End: 2024-09-26

## 2024-09-26 DIAGNOSIS — H90.3 SENSORY HEARING LOSS, BILATERAL: Primary | ICD-10-CM

## 2024-09-26 PROCEDURE — V5261 HEARING AID, DIGIT, BIN, BTE: HCPCS | Performed by: AUDIOLOGIST

## 2024-09-26 PROCEDURE — V5020 CONFORMITY EVALUATION: HCPCS | Performed by: AUDIOLOGIST

## 2024-09-26 PROCEDURE — V5011 HEARING AID FITTING/CHECKING: HCPCS | Performed by: AUDIOLOGIST

## 2024-09-26 PROCEDURE — V5160 DISPENSING FEE BINAURAL: HCPCS | Performed by: AUDIOLOGIST

## 2024-09-26 NOTE — PROGRESS NOTES
AUDIOLOGY REPORT    SUBJECTIVE: Yanely Jon is a 74 year old female who was seen in the Audiology Clinic at the [facility] for a fitting of Phonak Audeo L50-RL DAYNE hearing aids. Previous test results completed on 5/3/24 have revealed a bilateral steeply sloping moderately severe, rising to mild sensorineural hearing loss.   OBJECTIVE: The hearing aid conformity evaluation was completed.The hearing aids were placed and they provided a good fit. Real-ear-probe-microphone measurements were completed on the [employer] system and were a good match to NAL-NL1 target with soft sounds audible, moderate sounds comfortable, and loud sounds below discomfort. UCLs are verified through maximum power output measures and demonstrate appropriate limiting of loud inputs. Virginia was oriented to proper hearing aid use, care, cleaning (no water, dry brush),  usage, aid insertion/removal, user booklet, warranty information, storage cases, and other hearing aid details. The patient confirmed understanding of hearing aid use and care, and showed proper insertion of hearing aid and batteries while in the office today.Virginia reported good volume and sound quality today.   Hearing aids were programmed as follows:  Program 1: Auto only  EAR(S) FIT: Bilateral  HEARING AID MODEL NAME: Phonak Audeo L50-RL  HEARING AID STYLE: -in-the-ear behind-the-ear  EARMOLDS/TIP: Medium closed  SERIAL NUMBERS: Right: [id number] Left: [id number]  WARRANTY END DATE: 10/22/2027  The patient signed a waiver per their insurance contract.    ASSESSMENT: Phonak Audeo L50-RL DAYNE hearing aid(s) were fit today. Verification measures were performed. Virginia signed the Hearing Aid Purchase Agreement and was given a copy, as well as details on her hearing aids. Patient was counseled that exact out of pocket amounts cannot be determined for hearing aid claims being sent to insurance. Any insurance coverage information  presented to the patient is an estimate only, and is not a guarantee of payment. Patient has been advised to check with their own insurance.    PLAN:Virginia will return for follow-up in 2-3 weeks for a hearing aid review appointment. Please call this clinic with questions regarding today s appointment.      CATHY Robles.  Doctor of Audiology Student      I was present with the patient for the entire Audiology appointment including all procedures/testing performed by the AuD student, and agree with the student s assessment and plan as documented.      Yasmany Martino., [facility]  Licensed Audiologist  [place] #[id number]

## 2024-10-02 ENCOUNTER — ANESTHESIA EVENT (OUTPATIENT)
Dept: SURGERY | Facility: AMBULATORY SURGERY CENTER | Age: 74
End: 2024-10-02
Payer: MEDICARE

## 2024-10-02 NOTE — TELEPHONE ENCOUNTER
Patient calling to report that she has a colonoscopy tomorrow and her 2nd jug of golytely was thrown away by her , states he thought it was to go to recycle.    Patient was issued extended golytely prep.    Writer E-scribed a 4000 ml jug to pharmacy of choice to replace the discarded one with the sig, Day of procedure 6 hours before arrival time fill the 2nd container with water. Mix and drink an 8 oz glass every 15 minutes until HALF of the container is gone. Discard the remaining solution.    Valery Hurd RN  Endoscopy Procedure Pre Assessment RN  [phone] option 4

## 2024-10-03 ENCOUNTER — HOSPITAL ENCOUNTER (OUTPATIENT)
Facility: AMBULATORY SURGERY CENTER | Age: 74
Discharge: HOME | End: 2024-10-03
Attending: STUDENT IN AN ORGANIZED HEALTH CARE EDUCATION/TRAINING PROGRAM
Payer: MEDICARE

## 2024-10-03 ENCOUNTER — ANESTHESIA (OUTPATIENT)
Dept: SURGERY | Facility: AMBULATORY SURGERY CENTER | Age: 74
End: 2024-10-03
Payer: MEDICARE

## 2024-10-03 VITALS
HEART RATE: 99 BPM | BODY MASS INDEX: 26.37 KG/M2 | SYSTOLIC BLOOD PRESSURE: 127 MMHG | OXYGEN SATURATION: 98 % | DIASTOLIC BLOOD PRESSURE: 86 MMHG | TEMPERATURE: 97.1 F | RESPIRATION RATE: 16 BRPM | HEIGHT: 67 IN | WEIGHT: 168 LBS

## 2024-10-03 VITALS — HEART RATE: 88 BPM

## 2024-10-03 LAB — COLONOSCOPY: NORMAL

## 2024-10-03 PROCEDURE — 99100 ANES PT EXTEME AGE<1 YR&>70: CPT | Performed by: STUDENT IN AN ORGANIZED HEALTH CARE EDUCATION/TRAINING PROGRAM

## 2024-10-03 PROCEDURE — 45378 DIAGNOSTIC COLONOSCOPY: CPT | Performed by: NURSE ANESTHETIST, CERTIFIED REGISTERED

## 2024-10-03 PROCEDURE — G0121 COLON CA SCRN NOT HI RSK IND: HCPCS | Performed by: STUDENT IN AN ORGANIZED HEALTH CARE EDUCATION/TRAINING PROGRAM

## 2024-10-03 PROCEDURE — 99100 ANES PT EXTEME AGE<1 YR&>70: CPT | Performed by: NURSE ANESTHETIST, CERTIFIED REGISTERED

## 2024-10-03 PROCEDURE — 45378 DIAGNOSTIC COLONOSCOPY: CPT | Performed by: STUDENT IN AN ORGANIZED HEALTH CARE EDUCATION/TRAINING PROGRAM

## 2024-10-03 RX ORDER — NALOXONE HYDROCHLORIDE 0.4 MG/ML
0.1 INJECTION, SOLUTION INTRAMUSCULAR; INTRAVENOUS; SUBCUTANEOUS
Status: DISCONTINUED | OUTPATIENT
Start: 2024-10-03 | End: 2024-10-04 | Stop reason: HOSPADM

## 2024-10-03 RX ORDER — ONDANSETRON 4 MG/1
4 TABLET, ORALLY DISINTEGRATING ORAL EVERY 30 MIN PRN
Status: DISCONTINUED | OUTPATIENT
Start: 2024-10-03 | End: 2024-10-04 | Stop reason: HOSPADM

## 2024-10-03 RX ORDER — ONDANSETRON 2 MG/ML
4 INJECTION INTRAMUSCULAR; INTRAVENOUS
Status: DISCONTINUED | OUTPATIENT
Start: 2024-10-03 | End: 2024-10-04 | Stop reason: HOSPADM

## 2024-10-03 RX ORDER — SODIUM CHLORIDE, SODIUM LACTATE, POTASSIUM CHLORIDE, CALCIUM CHLORIDE 600; 310; 30; 20 MG/100ML; MG/100ML; MG/100ML; MG/100ML
INJECTION, SOLUTION INTRAVENOUS CONTINUOUS
Status: DISCONTINUED | OUTPATIENT
Start: 2024-10-03 | End: 2024-10-04 | Stop reason: HOSPADM

## 2024-10-03 RX ORDER — OXYCODONE HYDROCHLORIDE 5 MG/1
10 TABLET ORAL
Status: DISCONTINUED | OUTPATIENT
Start: 2024-10-03 | End: 2024-10-04 | Stop reason: HOSPADM

## 2024-10-03 RX ORDER — PROPOFOL 10 MG/ML
INJECTION, EMULSION INTRAVENOUS PRN
Status: DISCONTINUED | OUTPATIENT
Start: 2024-10-03 | End: 2024-10-03

## 2024-10-03 RX ORDER — ACETAMINOPHEN 325 MG/1
975 TABLET ORAL ONCE
Status: COMPLETED | OUTPATIENT
Start: 2024-10-03 | End: 2024-10-03

## 2024-10-03 RX ORDER — LIDOCAINE HYDROCHLORIDE 20 MG/ML
INJECTION, SOLUTION INFILTRATION; PERINEURAL PRN
Status: DISCONTINUED | OUTPATIENT
Start: 2024-10-03 | End: 2024-10-03

## 2024-10-03 RX ORDER — DEXAMETHASONE SODIUM PHOSPHATE 10 MG/ML
4 INJECTION, SOLUTION INTRAMUSCULAR; INTRAVENOUS
Status: DISCONTINUED | OUTPATIENT
Start: 2024-10-03 | End: 2024-10-04 | Stop reason: HOSPADM

## 2024-10-03 RX ORDER — LIDOCAINE 40 MG/G
CREAM TOPICAL
Status: DISCONTINUED | OUTPATIENT
Start: 2024-10-03 | End: 2024-10-04 | Stop reason: HOSPADM

## 2024-10-03 RX ORDER — ONDANSETRON 2 MG/ML
4 INJECTION INTRAMUSCULAR; INTRAVENOUS EVERY 30 MIN PRN
Status: DISCONTINUED | OUTPATIENT
Start: 2024-10-03 | End: 2024-10-04 | Stop reason: HOSPADM

## 2024-10-03 RX ORDER — PROPOFOL 10 MG/ML
INJECTION, EMULSION INTRAVENOUS CONTINUOUS PRN
Status: DISCONTINUED | OUTPATIENT
Start: 2024-10-03 | End: 2024-10-03

## 2024-10-03 RX ORDER — OXYCODONE HYDROCHLORIDE 5 MG/1
5 TABLET ORAL
Status: DISCONTINUED | OUTPATIENT
Start: 2024-10-03 | End: 2024-10-04 | Stop reason: HOSPADM

## 2024-10-03 RX ADMIN — SODIUM CHLORIDE, SODIUM LACTATE, POTASSIUM CHLORIDE, CALCIUM CHLORIDE: 600; 310; 30; 20 INJECTION, SOLUTION INTRAVENOUS at 12:35

## 2024-10-03 RX ADMIN — PROPOFOL 80 MG: 10 INJECTION, EMULSION INTRAVENOUS at 13:29

## 2024-10-03 RX ADMIN — LIDOCAINE HYDROCHLORIDE 100 MG: 20 INJECTION, SOLUTION INFILTRATION; PERINEURAL at 13:29

## 2024-10-03 RX ADMIN — PROPOFOL 150 MCG/KG/MIN: 10 INJECTION, EMULSION INTRAVENOUS at 13:29

## 2024-10-03 NOTE — ANESTHESIA PREPROCEDURE EVALUATION
Anesthesia Pre-Procedure Evaluation    Patient: Yanely Jon   MRN: [id number] : 1950        Procedure : Procedure(s):  Colonoscopy          Past Medical History:   Diagnosis Date    Acrochordon     Atypical chest pain     Normal stress test in , performed for atypical chest pain    Benign nevus of skin     Numerous benign skin nevi    Biceps tendonitis     Bilateral bunions     Breast implant leak     Breast implants complicated by a leak. She then underwent replacement and repair.     Chronic fatigue     H/O urinary incontinence     Mild occasional female urinary incontinence which seems to be both urge and stress related.    History of bilateral cataract extraction     Hx of tubal ligation     Left hip pain     Occasional left hip pain due to osteoarthritis and/or trochanteric bursitis.    Lipoma of back     Chronic lipoma of the left back, status post-surgical resection in 2014    Low back pain     Occasional low back pain with radicular symptoms into the legs.    Osteoarthritis of knees, bilateral     Osteoarthritis of left hip     Osteopenia 2013    Postmenopausal status     Prediabetes 2020    Pubic bone pain     Musculoskeletal pubic bone pain due to a strain.    Rosacea     Seborrheic keratoses     Skin lesions     Atypical melanocytic lesions, status post multiple biopsies by Dr. Rivera in 2015. These biposies showed a junctional nevus with moderate atypia, which is also a dysplastic nevus and the final one was also a compound nevus, with mild atypia which is also a dysplastic nevus.  She was contacted by Dr. Rivera and was told to follow up with her again in 2016, for re-evaluation of her skin.    Skin tag     Solar elastosis     Solar lentiginosis     Sternal fracture     Mid-sternal fracture after blunt trauma while falling while running up stepsin May 2015. She may have also had some rib fractures as well. These healed with conservative  management.    Symptomatic varicose veins of both lower extremities 2015    Lower extremity venous varicosities, intermittently symptomatic, status post evaluation with Dr. Alonso in General Surgery in the summer of 2015.    Symptomatic varicose veins of both lower extremities     Status post injection and compression sclerotherapy    Syncope 2018    Urticaria     Questionable exercise-induced urticaria      Past Surgical History:   Procedure Laterality Date    APPENDECTOMY      BUNIONECTOMY Bilateral 10/2017    CATARACT IOL, RT/LT Right     COLONOSCOPY  2014    repeat in 10 yr    DENTAL SURGERY      MAMMOPLASTY AUGMENTATION Bilateral 1972    RHYTIDECTOMY (FACELIFT)  01/23/2014    neck    SCLEROTHERAPY  10/03/2014    varicose veins    SURGICAL PATHOLOGY EXAM  01/13/2014    lower back    TONSILLECTOMY      TOTAL KNEE ARTHROPLASTY Right 12/2021    TUBAL LIGATION        Allergies   Allergen Reactions    Cheese     Fentanyl Other (See Comments)     May have caused pt. To pass out after cataract     Methohexital Other (See Comments)     May have caused pt. To pass out after cataract     Midazolam Other (See Comments)     May have caused pt. To pass out after cataract surgery    Seasonal Allergies       Social History     Tobacco Use    Smoking status: Never    Smokeless tobacco: Never   Substance Use Topics    Alcohol use: Yes     Comment: 0-1 per day      Wt Readings from Last 1 Encounters:   05/15/24 83.5 kg (184 lb)        Anesthesia Evaluation            ROS/MED HX  ENT/Pulmonary:       Neurologic:       Cardiovascular:     (+)  - -   -  - -             fainting (syncope).                         METS/Exercise Tolerance:     Hematologic:       Musculoskeletal:       GI/Hepatic:       Renal/Genitourinary:       Endo:     (+)          thyroid problem (Thyroid nodule),            Psychiatric/Substance Use:       Infectious Disease:       Malignancy:       Other:            Physical Exam    Airway        Mallampati: II  "  TM distance: > 3 FB   Neck ROM: full   Mouth opening: > 3 cm    Respiratory Devices and Support         Dental       (+) Modest Abnormalities - crowns, retainers, 1 or 2 missing teeth      Cardiovascular   cardiovascular exam normal          Pulmonary   pulmonary exam normal                OUTSIDE LABS:  CBC:   Lab Results   Component Value Date    WBC 6.2 12/14/2021    WBC 6.8 10/28/2018    HGB 13.2 12/14/2021    HGB 13.9 12/11/2019    HCT 40.6 12/14/2021    HCT 43.5 12/11/2019     12/14/2021     10/28/2018     BMP:   Lab Results   Component Value Date     04/26/2024     04/24/2023    POTASSIUM 4.3 04/26/2024    POTASSIUM 4.2 04/24/2023    CHLORIDE 107 04/26/2024    CHLORIDE 104 04/24/2023    CO2 26 04/26/2024    CO2 25 04/24/2023    BUN 17.5 04/26/2024    BUN 15.5 04/24/2023    CR 0.60 04/26/2024    CR 0.60 04/24/2023     (H) 04/26/2024     (H) 04/24/2023     COAGS: No results found for: \"PTT\", \"INR\", \"FIBR\"  POC: No results found for: \"BGM\", \"HCG\", \"HCGS\"  HEPATIC:   Lab Results   Component Value Date    ALBUMIN 4.5 04/26/2024    PROTTOTAL 7.3 04/26/2024    ALT 38 04/26/2024    AST 31 04/26/2024    ALKPHOS 55 04/26/2024    BILITOTAL 0.3 04/26/2024     OTHER:   Lab Results   Component Value Date    A1C 5.6 12/14/2020    YAMILET 9.5 04/26/2024    TSH 1.19 05/04/2023    T4 1.29 05/04/2023       Anesthesia Plan    ASA Status:  1    NPO Status:  NPO Appropriate    Anesthesia Type: MAC.   Induction: Propofol.   Maintenance: TIVA.        Consents    Anesthesia Plan(s) and associated risks, benefits, and realistic alternatives discussed. Questions answered and patient/representative(s) expressed understanding.     - Discussed: Risks, Benefits and Alternatives for BOTH SEDATION and the PROCEDURE were discussed     - Discussed with:  Patient            Postoperative Care    Pain management: IV analgesics, Oral pain medications.   PONV prophylaxis: Dexamethasone or Solumedrol, " Ondansetron (or other 5HT-3)     Comments:               Ruben Mariee MD    I have reviewed the pertinent notes and labs in the chart from the past 30 days and (re)examined the patient.  Any updates or changes from those notes are reflected in this note.

## 2024-10-03 NOTE — ANESTHESIA CARE TRANSFER NOTE
Patient: Yanely Jon    Procedure: Procedure(s):  Colonoscopy       Diagnosis: Screen for colon cancer [Z12.11]  Diagnosis Additional Information: No value filed.    Anesthesia Type:   MAC     Note:    Oropharynx: oropharynx clear of all foreign objects and spontaneously breathing  Level of Consciousness: awake  Oxygen Supplementation: room air    Independent Airway: airway patency satisfactory and stable  Dentition: dentition unchanged  Vital Signs Stable: post-procedure vital signs reviewed and stable  Report to RN Given: handoff report given  Patient transferred to: Phase II  Comments: VSS and WNL, comfortable, no PONV, report to Ledy MERRILL  Handoff Report: Identifed the Patient, Identified the Reponsible Provider, Reviewed the pertinent medical history, Discussed the surgical course, Reviewed Intra-OP anesthesia mangement and issues during anesthesia, Set expectations for post-procedure period and Allowed opportunity for questions and acknowledgement of understanding      Vitals:  Vitals Value Taken Time   /83 10/03/24 1359   Temp 36.2  C (97.1  F) 10/03/24 1359   Pulse     Resp 16 10/03/24 1359   SpO2 95 % 10/03/24 1359       Electronically Signed By: JORDAN Rodriguez CRNA  October 3, 2024  2:00 PM

## 2024-10-03 NOTE — ANESTHESIA POSTPROCEDURE EVALUATION
Patient: Yanely Jon    Procedure: Procedure(s):  Colonoscopy       Anesthesia Type:  MAC    Note:  Disposition: Outpatient   Postop Pain Control: Uneventful            Sign Out: Well controlled pain   PONV: No   Neuro/Psych: Uneventful            Sign Out: Acceptable/Baseline neuro status   Airway/Respiratory: Uneventful            Sign Out: Acceptable/Baseline resp. status   CV/Hemodynamics: Uneventful            Sign Out: Acceptable CV status; No obvious hypovolemia; No obvious fluid overload   Other NRE: NONE   DID A NON-ROUTINE EVENT OCCUR? No           Last vitals:  Vitals Value Taken Time   /86 10/03/24 1413   Temp 36.2  C (97.1  F) 10/03/24 1359   Pulse     Resp 16 10/03/24 1413   SpO2 98 % 10/03/24 1413       Electronically Signed By: Ruben Mariee MD  October 3, 2024  3:03 PM

## 2024-10-24 ENCOUNTER — OFFICE VISIT (OUTPATIENT)
Dept: AUDIOLOGY | Facility: CLINIC | Age: 74
End: 2024-10-24
Payer: MEDICARE

## 2024-10-24 DIAGNOSIS — H90.3 SENSORY HEARING LOSS, BILATERAL: Primary | ICD-10-CM

## 2024-10-24 PROCEDURE — 99207 PR ASSESSMENT FOR HEARING AID: CPT | Performed by: AUDIOLOGIST

## 2024-10-24 NOTE — PROGRESS NOTES
AUDIOLOGY REPORT    SUBJECTIVE:Yanely Jon is a 74 year old female who was seen in the Audiology Clinic at the [facility] on 10/24/2024  for a follow-up check regarding the fitting of new hearing aids. [place] has been seen previously in this clinic and was fit with Phonak Audeo [id number] DAYNE hearing aids. Previous test results completed on 5/3/24 have revealed a bilateral steeply sloping moderately severe, rising to mild sensorineural hearing loss. [place] was accompanied by her  at today's fitting.     OBJECTIVE:     Patient reported good sound quality overall with the hearing aids.    Based on patient report, the following changes were made;  - Increase to 7 in soft noise reduction     Patient's phone was connect via bluetooth to the patient's phone. Instructions on how to operate the phone and the hearing aids together was reviewed in detail. Елена for the hearing aid is reviewed.     Patient's remote microphone was connected to her hearing aids. Usage of the device was demonstrated and practiced.    Reviewed 45 day trial period, care, cleaning (no water, dry brush), aid insertion/removal, volume adjustment (if applicable), user booklet, warranty information, storage cases, and other hearing aid details.     No charge visit today (in warranty hearing aid check).    ASSESSMENT: A follow-up appointment for hearing aid fitting was completed today.  Changes to hearing aid was completed as outlined above.     PLAN:[place]ia will return for follow-up as needed, or at least every 9-12 months for cleaning and assessment of hearing aid.  Please call this clinic with any questions regarding today s appointment.    CATHY Robles., Doctor of Audiology Student    I was present with the patient for the entire Audiology appointment including all procedures/testing performed by the AuD student, and agree with the student s assessment and plan as documented.      Roslyn PARK  Hien Devi, [facility]  Licensed Audiologist  [place] #[id number]

## 2024-10-24 NOTE — PROGRESS NOTES
AUDIOLOGY REPORT    SUBJECTIVE:Yanely Jon is a 74 year old female who was seen in the Audiology Clinic at the [facility] on 10/24/2024  for a follow-up check regarding the fitting of new hearing aids. [place] has been seen previously in this clinic and was fit with *** on ***. Previous results  . [place] reports    OBJECTIVE:     Based on patient report, the following changes were made;***.    Patient's phone was connect via bluetooth to the patient's phone. Instructions on how to operate the phone and the hearing aids together was reviewed in detail. Елена for the hearing aid is reviewed.     Reviewed 45 day trial period, care, cleaning (no water, dry brush), batteries (size ***) insertion/removal, toxicity, low-battery signal), aid insertion/removal, volume adjustment (if applicable), user booklet, warranty information, storage cases, and other hearing aid details.     {Per guidelines of patient's insurance, 24 units of size *** batteries were dispensed today. Reviewed that patent is eligable for batteries once every 90 days, and how they can request new batteries.}    No charge visit today (in warranty hearing aid check).    ASSESSMENT: A follow-up appointment for hearing aid fitting was completed today.  Changes to hearing aid was completed as outlined above.     PLAN:Virginia will return for follow-up as needed, or at least every 9-12 months for cleaning and assessment of hearing aid.  . Please call this clinic with any questions regarding today s appointment.        Hien Martino, [facility]  Licensed Audiologist  [place] #[id number]

## 2024-10-28 NOTE — PROGRESS NOTES
10/28/24 12:51 PM  PATIENT LAB/IMAGING STATUS : MyChart sent to patient to complete standing/future orders

## 2024-11-04 ENCOUNTER — ANCILLARY PROCEDURE (OUTPATIENT)
Dept: ULTRASOUND IMAGING | Facility: CLINIC | Age: 74
End: 2024-11-04
Payer: MEDICARE

## 2024-11-04 DIAGNOSIS — E04.1 THYROID NODULE: ICD-10-CM

## 2024-11-04 PROCEDURE — 76536 US EXAM OF HEAD AND NECK: CPT | Mod: GC | Performed by: STUDENT IN AN ORGANIZED HEALTH CARE EDUCATION/TRAINING PROGRAM

## 2024-11-19 ENCOUNTER — VIRTUAL VISIT (OUTPATIENT)
Dept: ENDOCRINOLOGY | Facility: CLINIC | Age: 74
End: 2024-11-19
Payer: MEDICARE

## 2024-11-19 DIAGNOSIS — Z92.3 PERSONAL HISTORY OF IRRADIATION, PRESENTING HAZARDS TO HEALTH: ICD-10-CM

## 2024-11-19 DIAGNOSIS — E04.1 THYROID NODULE: Primary | ICD-10-CM

## 2024-11-19 DIAGNOSIS — R59.0 CERVICAL ADENOPATHY: ICD-10-CM

## 2024-11-19 NOTE — LETTER
11/19/2024       RE: Yanely Jon  [address]  [facility] [id number]     Dear Colleague,    Thank you for referring your patient, Yanely Jon, to the [facility]OCR[facility]OLOG[facility] [place] at [facility]. Please see a copy of my visit note below.    10/28/24 12:51 PM  PATIENT LAB/IMAGING STATUS : MyChart sent to patient to complete standing/future orders       Endocrinology video visit    Attending Assessment/Plan :     Multiple thyroid nodules, all subcm with perithyroidal adenopathy She has history of radiation exposure as a child which increases her risk of thyroid nodules and malignancy.     perithyroidal adenopathy- benign cytology on bilateral lateral neck LNs 2020.    Right level 3 #1 and right level 7 # 2 are new  - recommend FNAB     Low bone density. Family history of hip fracture in her mother.      Bone gain On alendronate since 2/2020.  Dr Almanzar is managing it.  Discussed.     History of  corticosteroid injections for her back - this is a risk for the bone - as above.      Post menopausal    History of radiation exposure presenting health risks - [employer] store fluroscope exposure as a child approx age 4.  As above.      The Longitudinal plan of care for nodular thyroid in the context of childhood radiation exposure was addressed during this visit. Due to added complexity of care, we will continue to support [place] , and the subsequent management of this condition(s) and with the ongoing continuity of care of this condition.    Due to the continued risks of COVID 19 transmission and to improve ease of access this visit was a video visit. The patient gave verbal consent for the visit today.    I have independently reviewed and interpreted labs, imaging as indicated.     Distant Location (provider location):  Off-site  Mode of Communication:  Video Conference via SETVI  Chart review/prep time 1  [id number];  1217  Visit Start time  1620   Visit Stop time  1628   _30_ minutes spent on the date of the encounter doing chart review, history and exam, documentation and further activities as noted above.    Olga Murry MD    Chief complaint: HISTORY OF PRESENT ILLNESS  Ashtyn presents today for follow up of multiple thyroid nodules, perithyroidal adenopathy and low bone density. I last saw her 5/2024. At that time we had no test results. She has now had DXA and thyroid US in anticipation of this appt .      Thyroid nodule was lst seen on an MRI  Since then, she has had thyorid US x 5.  We performed FNAB on some LNs in 2020, with benign cytology.   Ashtyn was exposed to the shoe store fluoroscope at [facility] around age 4 years.     She has been on alendronate since 2/2020., managed by  Dr Almanzar     We have the following labs:   10/6/16 HgbA1c 5.6%  12/11/19 TSH 2.58, creatinine 0.7, Ca 9.4, glucose 106, cholesterol 262,   2/26/2020 calcitonin < 2, TPO < 10, HgbA1c 5.7%  12/14/2020 glucose 95, Ca 9, creatinine 0.59  4/24/23 Ca 9.9, creatinine 0.6, glucose 103  5/4/23 TSH 1.19, free T4 1.29    I have reviewed images on pACS  6/14/24 DXA lowest T-score -1.6 left femoral neck ; Bone gain since 2022 and 2019.   11/4/24 thyroid and neck US compared with 11/21/23 , 11/22/2021,  9/10/2020,  12/19-- as read by me again today   Right # 1 0.7 x 0.5 x 1 was 0.8 x 0.4 x 1.1 (0.18 CM3)anterior isthmus was right # 2 0.6 x 0.4 x 0.9  (0.11 CM3);  0.7 x 0.5 x 0.8 (0.14 CM3; 2019)  Left # 2  0.9 x 0.7 x 1.2 cm was 0.8 x 0.6 x 1.2 cm (0.29 CM3) solid hypoechoic anterior was 0.7 x 0.6 x 0.9 cm mixed (0.19 CM3);  0.7 x 0.7 x 0.7;  0.7 x 0.6 x 0.7 cm (0.15 CM3) )  Left # 3 0.7 x 0.3 x 0.9 cm was 0.8 x 0.4 x 0.8 was ?  left # 5  0.6 x 0.3 x 0.6   Right level 3 # 1 0.8 x 0.5 x 0.9 cm new?  This looks a lot like right level 4 # 2 from 2020  Right level 4# 1 0.3 x 0.8 x 0.7 cm was ? Right level 4 # 1 0.4 x 0.7 x 1 was  0.4 x 0.5 x was  0.9 x 0.5  x 0.7 cm FNAB 11/23/2020 benign    Right level 7 # 1  0.8 x 0.6 x 0.7 cm was 0.7 x 0.5 x 0.7 cm was 1.0 x 0.6 x 1.1 cm;  1.3 x 0.7 x 1.1 cm  Right level 7 # 2  1.3 x 0.7 x 0.8 cm   Left level 3 1 x 0.5 x 1.8 cm was 0.9 x 0.5 x 1.5 cm was 0.9 x 0.4 x 1.7 cm;    0.9 x 0.5 x 2 cm ; 0.8 x 0.4 x 1.7 cm)- FNAB 11/23/20- benign   Left level 7 # 1  1.3 x 0.5 x 0.9 cm was 1.2 x 0.5 x 0.8 cm was  1.2 x 0.4 x 1.2; 1.1 x 0.6 x 0.9 cm      REVIEW OF SYSTEMS  No neck or throat symptoms  Chicken breast can be dry  Voice is OK   Swallowing OK   Energy good  Sleep good       Past Medical History  Past Medical History:   Diagnosis Date     Acrochordon      Atypical chest pain 1999    Normal stress test in 1999, performed for atypical chest pain     Benign nevus of skin     Numerous benign skin nevi     Biceps tendonitis      Bilateral bunions      Breast implant leak     Breast implants complicated by a leak. She then underwent replacement and repair.      Chronic fatigue      H/O urinary incontinence     Mild occasional female urinary incontinence which seems to be both urge and stress related.     History of bilateral cataract extraction      Hx of tubal ligation      Left hip pain     Occasional left hip pain due to osteoarthritis and/or trochanteric bursitis.     Lipoma of back 2014    Chronic lipoma of the left back, status post-surgical resection in January 2014     Low back pain     Occasional low back pain with radicular symptoms into the legs.     Osteoarthritis of knees, bilateral      Osteoarthritis of left hip      Osteopenia 09/13/2013     Postmenopausal status      Prediabetes 02/2020     Pubic bone pain     Musculoskeletal pubic bone pain due to a strain.     Rosacea      Seborrheic keratoses      Skin lesions     Atypical melanocytic lesions, status post multiple biopsies by Dr. Rivera in July of 2015. These biposies showed a junctional nevus with moderate atypia, which is also a dysplastic nevus and the final one was  also a compound nevus, with mild atypia which is also a dysplastic nevus.  She was contacted by Dr. Rivera and was told to follow up with her again in July 2016, for re-evaluation of her skin.     Skin tag      Solar elastosis      Solar lentiginosis      Sternal fracture 2015    Mid-sternal fracture after blunt trauma while falling while running up stepsin May 2015. She may have also had some rib fractures as well. These healed with conservative management.     Symptomatic varicose veins of both lower extremities 2015    Lower extremity venous varicosities, intermittently symptomatic, status post evaluation with Dr. Alonso in General Surgery in the summer of 2015.     Symptomatic varicose veins of both lower extremities     Status post injection and compression sclerotherapy     Syncope 2018     Urticaria     Questionable exercise-induced urticaria     Rosacea  Syncope 2018    Past Surgical History:   Procedure Laterality Date     APPENDECTOMY       BUNIONECTOMY Bilateral 10/2017     CATARACT IOL, RT/LT Right      COLONOSCOPY  2014    repeat in 10 yr     COLONOSCOPY N/A 10/3/2024    Procedure: Colonoscopy;  Surgeon: Angely Forman MD;  [place] [facility] [place]ENTAL SURGERY       MAMMOPLASTY AUGMENTATION Bilateral 1972     RHYTIDECTOMY (FACELIFT)  01/23/2014    neck     SCLEROTHERAPY  10/03/2014    varicose veins     SURGICAL PATHOLOGY EXAM  01/13/2014    lower back     TONSILLECTOMY       TOTAL KNEE ARTHROPLASTY Right 12/2021     TUBAL LIGATION         Medications    Current Outpatient Medications   Medication Sig Dispense Refill     alendronate (FOSAMAX) 70 MG tablet Take 1 tablet (70 mg) by mouth every 7 days 12 tablet 3     atorvastatin (LIPITOR) 10 MG tablet Take 1 tablet (10 mg) by mouth daily 90 tablet 3     bisacodyl (DULCOLAX) 5 MG EC tablet Two days prior to exam take two (2) tablets at 4pm. One day prior to exam take two (2) tablets at 4pm 4 tablet 0     calcium carbonate 600 mg-vitamin D 400 units  (CALTRATE) 600-400 MG-UNIT per tablet Take 1 tablet by mouth 2 times daily 180 tablet 3     celecoxib (CELEBREX) 200 MG capsule Take 1 capsule (200 mg) by mouth daily 90 capsule 3     cetirizine (ZYRTEC) 10 MG tablet Take 1 tablet (10 mg) by mouth daily 90 tablet 3     DULoxetine (CYMBALTA) 30 MG capsule        metroNIDAZOLE (METROGEL) 1 % gel        Omega-3 Fatty Acids (FISH OIL PO)        polyethylene glycol (GOLYTELY) 236 g suspension Two days before procedure at 5PM fill first container with water. Mix and drink an 8 oz glass every 15 minutes until HALF of the container is gone. Place the remainder in the refrigerator. One day before procedure at 5PM drink second half of bowel prep. Drink an 8 oz glass every 15 minutes until it is gone. Day of procedure 6 hours before arrival time fill the 2nd container with water. Mix and drink an 8 oz glass every 15 minutes until HALF of the container is gone. Discard the remaining solution. 8000 mL 0     tretinoin (RETIN-A) 0.1 % cream Apply QHS to face for rosacea       VITAMIN D PO Take 4,000 Units by mouth daily           Allergies  Allergies   Allergen Reactions     Cheese      Fentanyl Other (See Comments)     May have caused pt. To pass out after cataract      Methohexital Other (See Comments)     May have caused pt. To pass out after cataract      Midazolam Other (See Comments)     May have caused pt. To pass out after cataract surgery     Seasonal Allergies      Family History  family history includes Bladder Cancer (age of onset: 67) in her father; Breast Cancer (age of onset: 53) in her sister; Cerebrovascular Disease in her maternal aunt; Cerebrovascular Disease (age of onset: 95) in her mother; Heart Disease (age of onset: 50) in her brother; Hip fracture in her mother; Hyperlipidemia in her maternal aunt and maternal aunt; Osteoarthritis in her mother; Osteoporosis in her mother; Other - See Comments in her sister.    Social History  Social History     Tobacco  "Use     Smoking status: Never     Smokeless tobacco: Never   Vaping Use     Vaping status: Never Used   Substance Use Topics     Alcohol use: Yes     Comment: 0-1 per day     Drug use: No       Physical Exam  GENERAL: no distress; voice normal; I can see from mid chest up; I can hear her  talking in the background  BP Readings from Last 1 Encounters:   10/03/24 127/86      Pulse Readings from Last 1 Encounters:   10/03/24 88      Resp Readings from Last 1 Encounters:   10/03/24 16      Temp Readings from Last 1 Encounters:   10/03/24 97.1  F (36.2  C) (Temporal)      SpO2 Readings from Last 1 Encounters:   10/03/24 98%      Wt Readings from Last 1 Encounters:   10/03/24 76.2 kg (168 lb)      Ht Readings from Last 1 Encounters:   10/03/24 1.695 m (5' 6.75\")     SKIN: Visible skin clear. No significant rash, abnormal pigmentation or lesions.  EYES: glasses; Eyes grossly normal to inspection.    NECK: no visible masses; no grossly visible goiter  RESP: No audible wheeze, cough, or increased work of breathing.    NEURO: Awake, alert, responds appropriately to questions.  Mentation and speech fluent.  PSYCH:affect normal,and appearance well-groomed.    DATA REVIEW    US THYROID 11/4/2024 9:40 AM  COMPARISON: Thyroid ultrasound 11/21/2023  HISTORY: Thyroid nodule  FINDINGS:   Thyroid parenchyma: heterogenous  The right lobe of the thyroid measures: 4.0 x 1.7 x 1.6 cm  The left lobe of the thyroid measures: 3.6 x 1.6 x 1.9 cm  The thyroid isthmus measures: 0.29 cm     Nodule 1:  Lobe: Right  Location: Superior  Size: 1.0 x 0.7 x 0.5 cm, previously 1.1 x 0.8 x 0.4 cm  Composition: Solid or almost completely solid (2 points)  Echogenicity: Hyperechoic or isoechoic (1 point)  Shape: Wider than tall (0 points)  Margin: Ill-defined (0 points)  Echogenic Foci: Punctate echogenic foci (3 points)  Stability: No significant change in size  Other: Hypervascular  TIRADS: TR4 (4-6 points)      Nodule 2:  Lobe: Left  Location: " Inferior  Size: 1.2 x 0.9 x 0.7 cm, previously 1.2 x 0.8 x 0.6 cm  Composition: Solid or almost completely solid (2 points)  Echogenicity: Hypoechoic (2 points)  Shape: Wider than tall (0 points)  Margin: Smooth (0 points)  Echogenic Foci: None or large comet tail artifact (0 points)  Stability: No significant change in size  Other: Hypervascular  TIRADS: TR4 (4-6 points)      Nodule 3:  Lobe: Left  Location: Inferior  Size: 0.9 x 0.7 x 0.3 cm, previously 0.9 x 0.8 x 0.4 cm  Composition: Solid or almost completely solid (2 points)  Echogenicity: Hypoechoic (2 points)  Shape: Wider than tall (0 points)  Margin: Smooth (0 points)  Echogenic Foci: None or large comet tail artifact (0 points)  Stability: No significant change in size  TIRADS: TR4 (4-6 points)                                                                 Impression:  1. Nodule 1 and nodule 2 represent TR 4 thyroid nodules that meet size and imaging criteria for follow-up imaging at 1, 2, 3 and 5 years. This exam represents the 1 year follow-up.   VENKAT MARCOS DO        EXAMINATION: US HEAD NECK SOFT TISSUE, 11/4/2024 9:40 AM   COMPARISON: Soft tissue neck ultrasound 11/21/2023  HISTORY: Thyroid nodule  TECHNIQUE: Sonographic imaging performed of the neck to evaluate for lymph nodes using grey scale and limited Doppler technique.  FINDINGS:  Lymph nodes are measured bilaterally with measurements given in transverse, AP, and craniocaudal dimensions as follows:  Right:  Level 2: 1.4 x 0.5 x 1.4 cm lymph node with fatty hilum, similar in morphology to previous exam  Level 3: 0.8 x 0.5 x 0.9 cm oval lymph node not seen on previous exam  Level 4: 0.8 x 0.3 x 0.8 cm lymph node which previously measured 0.7 x  0.4 x 1.0 cm  Level 7: 0.8 x 0.6 x 0.7 cm oval node which previously measured 0.7 x 0.5 x 0.7 cm  1.3 x 0.7 x 0.8 cm round lymph node with large fatty hilum not  measured on previous exam  Left:  Level 2: 0.9 x 0.7 x 1.3 cm lymph node with fatty hilum  reduced in overall size from comparison  0.8 x 0.5 x 1.2 cm oval lymph node which previously measured 0.6 x 0.4x 1.2 cm  Level 3: 1.0 x 0.5 x 1.8 cm lymph node which previously measured 0.9 x0.5 x 1.5 cm  Level 7: 1.3 x 0.5 x 0.9 cm lymph node with small fatty hilum, previously 1.2 x 0.5 x 0.8 cm                                                              IMPRESSION:  1.  Soft tissue neck ultrasound with lymph node measurements as described above.  2.  No discrete focal mass in the visualized soft tissues of the neck.    VENKAT MARCOS DO                     Virtual Visit Details    Type of service:  Video Visit       Again, thank you for allowing me to participate in the care of your patient.      Sincerely,    Olga Murry MD

## 2024-11-19 NOTE — PROGRESS NOTES
Endocrinology video visit    Attending Assessment/Plan :     Multiple thyroid nodules, all subcm with perithyroidal adenopathy She has history of radiation exposure as a child which increases her risk of thyroid nodules and malignancy.     perithyroidal adenopathy- benign cytology on bilateral lateral neck LNs 2020.    Right level 3 #1 and right level 7 # 2 are new  - recommend FNAB     Low bone density. Family history of hip fracture in her mother.      Bone gain On alendronate since 2/2020.  Dr Almanzar is managing it.  Discussed.     History of  corticosteroid injections for her back - this is a risk for the bone - as above.      Post menopausal    History of radiation exposure presenting health risks - [employer] store fluroscope exposure as a child approx age 4.  As above.      The Longitudinal plan of care for nodular thyroid in the context of childhood radiation exposure was addressed during this visit. Due to added complexity of care, we will continue to support [place] , and the subsequent management of this condition(s) and with the ongoing continuity of care of this condition.    Due to the continued risks of COVID 19 transmission and to improve ease of access this visit was a video visit. The patient gave verbal consent for the visit today.    I have independently reviewed and interpreted labs, imaging as indicated.     Distant Location (provider location):  Off-site  Mode of Communication:  Video Conference via [employer]  Chart review/prep time 1  [id number]; 1217  Visit Start time  1620   Visit Stop time  1628   _30_ minutes spent on the date of the encounter doing chart review, history and exam, documentation and further activities as noted above.    Olga Murry MD    Chief complaint: HISTORY OF PRESENT ILLNESS  Ashtyn presents today for follow up of multiple thyroid nodules, perithyroidal adenopathy and low bone density. I last saw her 5/2024. At that time we had no test results. She has now had  DXA and thyroid US in anticipation of this appt .      Thyroid nodule was lst seen on an MRI  Since then, she has had thyorid US x 5.  We performed FNAB on some LNs in 2020, with benign cytology.   Ashtyn was exposed to the shoe [employer] fluoroscope at [facility] around age 4 years.     She has been on alendronate since 2/2020., managed by  Dr Almanzar     We have the following labs:   10/6/16 HgbA1c 5.6%  12/11/19 TSH 2.58, creatinine 0.7, Ca 9.4, glucose 106, cholesterol 262,   2/26/2020 calcitonin < 2, TPO < 10, HgbA1c 5.7%  12/14/2020 glucose 95, Ca 9, creatinine 0.59  4/24/23 Ca 9.9, creatinine 0.6, glucose 103  5/4/23 TSH 1.19, free T4 1.29    I have reviewed images on pACS  6/14/24 DXA lowest T-score -1.6 left femoral neck ; Bone gain since 2022 and 2019.   11/4/24 thyroid and neck US compared with 11/21/23 , 11/22/2021,  9/10/2020,  12/19-- as read by me again today   Right # 1 0.7 x 0.5 x 1 was 0.8 x 0.4 x 1.1 (0.18 CM3)anterior isthmus was right # 2 0.6 x 0.4 x 0.9  (0.11 CM3);  0.7 x 0.5 x 0.8 (0.14 CM3; 2019)  Left # 2  0.9 x 0.7 x 1.2 cm was 0.8 x 0.6 x 1.2 cm (0.29 CM3) solid hypoechoic anterior was 0.7 x 0.6 x 0.9 cm mixed (0.19 CM3);  0.7 x 0.7 x 0.7;  0.7 x 0.6 x 0.7 cm (0.15 CM3) )  Left # 3 0.7 x 0.3 x 0.9 cm was 0.8 x 0.4 x 0.8 was ?  left # 5  0.6 x 0.3 x 0.6   Right level 3 # 1 0.8 x 0.5 x 0.9 cm new?  This looks a lot like right level 4 # 2 from 2020  Right level 4# 1 0.3 x 0.8 x 0.7 cm was ? Right level 4 # 1 0.4 x 0.7 x 1 was  0.4 x 0.5 x was  0.9 x 0.5 x 0.7 cm FNAB 11/23/2020 benign    Right level 7 # 1  0.8 x 0.6 x 0.7 cm was 0.7 x 0.5 x 0.7 cm was 1.0 x 0.6 x 1.1 cm;  1.3 x 0.7 x 1.1 cm  Right level 7 # 2  1.3 x 0.7 x 0.8 cm   Left level 3 1 x 0.5 x 1.8 cm was 0.9 x 0.5 x 1.5 cm was 0.9 x 0.4 x 1.7 cm;    0.9 x 0.5 x 2 cm ; 0.8 x 0.4 x 1.7 cm)- FNAB 11/23/20- benign   Left level 7 # 1  1.3 x 0.5 x 0.9 cm was 1.2 x 0.5 x 0.8 cm was  1.2 x 0.4 x 1.2; 1.1 x 0.6 x 0.9 cm      REVIEW OF SYSTEMS  No  neck or throat symptoms  Chicken breast can be dry  Voice is OK   Swallowing OK   Energy good  Sleep good       Past Medical History  Past Medical History:   Diagnosis Date    Acrochordon     Atypical chest pain 1999    Normal stress test in 1999, performed for atypical chest pain    Benign nevus of skin     Numerous benign skin nevi    Biceps tendonitis     Bilateral bunions     Breast implant leak     Breast implants complicated by a leak. She then underwent replacement and repair.     Chronic fatigue     H/O urinary incontinence     Mild occasional female urinary incontinence which seems to be both urge and stress related.    History of bilateral cataract extraction     Hx of tubal ligation     Left hip pain     Occasional left hip pain due to osteoarthritis and/or trochanteric bursitis.    Lipoma of back 2014    Chronic lipoma of the left back, status post-surgical resection in January 2014    Low back pain     Occasional low back pain with radicular symptoms into the legs.    Osteoarthritis of knees, bilateral     Osteoarthritis of left hip     Osteopenia 09/13/2013    Postmenopausal status     Prediabetes 02/2020    Pubic bone pain     Musculoskeletal pubic bone pain due to a strain.    Rosacea     Seborrheic keratoses     Skin lesions     Atypical melanocytic lesions, status post multiple biopsies by Dr. Rivera in July of 2015. These biposies showed a junctional nevus with moderate atypia, which is also a dysplastic nevus and the final one was also a compound nevus, with mild atypia which is also a dysplastic nevus.  She was contacted by Dr. Rivera and was told to follow up with her again in July 2016, for re-evaluation of her skin.    Skin tag     Solar elastosis     Solar lentiginosis     Sternal fracture 2015    Mid-sternal fracture after blunt trauma while falling while running up stepsin May 2015. She may have also had some rib fractures as well. These healed with conservative management.    Symptomatic  varicose veins of both lower extremities 2015    Lower extremity venous varicosities, intermittently symptomatic, status post evaluation with Dr. Alonso in General Surgery in the summer of 2015.    Symptomatic varicose veins of both lower extremities     Status post injection and compression sclerotherapy    Syncope 2018    Urticaria     Questionable exercise-induced urticaria     Rosacea  Syncope 2018    Past Surgical History:   Procedure Laterality Date    APPENDECTOMY      BUNIONECTOMY Bilateral 10/2017    CATARACT IOL, RT/LT Right     COLONOSCOPY  2014    repeat in 10 yr    COLONOSCOPY N/A 10/3/2024    Procedure: Colonoscopy;  Surgeon: Angely Forman MD;  Location: [facility] [place]    DENTAL SURGERY      MAMMOPLASTY AUGMENTATION Bilateral 1972    RHYTIDECTOMY (FACELIFT)  01/23/2014    neck    SCLEROTHERAPY  10/03/2014    varicose veins    SURGICAL PATHOLOGY EXAM  01/13/2014    lower back    TONSILLECTOMY      TOTAL KNEE ARTHROPLASTY Right 12/2021    TUBAL LIGATION         Medications    Current Outpatient Medications   Medication Sig Dispense Refill    alendronate (FOSAMAX) 70 MG tablet Take 1 tablet (70 mg) by mouth every 7 days 12 tablet 3    atorvastatin (LIPITOR) 10 MG tablet Take 1 tablet (10 mg) by mouth daily 90 tablet 3    bisacodyl (DULCOLAX) 5 MG EC tablet Two days prior to exam take two (2) tablets at 4pm. One day prior to exam take two (2) tablets at 4pm 4 tablet 0    calcium carbonate 600 mg-vitamin D 400 units (CALTRATE) 600-400 MG-UNIT per tablet Take 1 tablet by mouth 2 times daily 180 tablet 3    celecoxib (CELEBREX) 200 MG capsule Take 1 capsule (200 mg) by mouth daily 90 capsule 3    cetirizine (ZYRTEC) 10 MG tablet Take 1 tablet (10 mg) by mouth daily 90 tablet 3    DULoxetine (CYMBALTA) 30 MG capsule       metroNIDAZOLE (METROGEL) 1 % gel       Omega-3 Fatty Acids (FISH OIL PO)       polyethylene glycol (GOLYTELY) 236 g suspension Two days before procedure at 5PM fill first container with water. Mix  and drink an 8 oz glass every 15 minutes until HALF of the container is gone. Place the remainder in the refrigerator. One day before procedure at 5PM drink second half of bowel prep. Drink an 8 oz glass every 15 minutes until it is gone. Day of procedure 6 hours before arrival time fill the 2nd container with water. Mix and drink an 8 oz glass every 15 minutes until HALF of the container is gone. Discard the remaining solution. 8000 mL 0    tretinoin (RETIN-A) 0.1 % cream Apply QHS to face for rosacea      VITAMIN D PO Take 4,000 Units by mouth daily           Allergies  Allergies   Allergen Reactions    Cheese     Fentanyl Other (See Comments)     May have caused pt. To pass out after cataract     Methohexital Other (See Comments)     May have caused pt. To pass out after cataract     Midazolam Other (See Comments)     May have caused pt. To pass out after cataract surgery    Seasonal Allergies      Family History  family history includes Bladder Cancer (age of onset: 67) in her father; Breast Cancer (age of onset: 53) in her sister; Cerebrovascular Disease in her maternal aunt; Cerebrovascular Disease (age of onset: 95) in her mother; Heart Disease (age of onset: 50) in her brother; Hip fracture in her mother; Hyperlipidemia in her maternal aunt and maternal aunt; Osteoarthritis in her mother; Osteoporosis in her mother; Other - See Comments in her sister.    Social History  Social History     Tobacco Use    Smoking status: Never    Smokeless tobacco: Never   Vaping Use    Vaping status: Never Used   Substance Use Topics    Alcohol use: Yes     Comment: 0-1 per day    Drug use: No       Physical Exam  GENERAL: no distress; voice normal; I can see from mid chest up; I can hear her  talking in the background  BP Readings from Last 1 Encounters:   10/03/24 127/86      Pulse Readings from Last 1 Encounters:   10/03/24 88      Resp Readings from Last 1 Encounters:   10/03/24 16      Temp Readings from Last 1  "Encounters:   10/03/24 97.1  F (36.2  C) (Temporal)      SpO2 Readings from Last 1 Encounters:   10/03/24 98%      Wt Readings from Last 1 Encounters:   10/03/24 76.2 kg (168 lb)      Ht Readings from Last 1 Encounters:   10/03/24 1.695 m (5' 6.75\")     SKIN: Visible skin clear. No significant rash, abnormal pigmentation or lesions.  EYES: glasses; Eyes grossly normal to inspection.    NECK: no visible masses; no grossly visible goiter  RESP: No audible wheeze, cough, or increased work of breathing.    NEURO: Awake, alert, responds appropriately to questions.  Mentation and speech fluent.  PSYCH:affect normal,and appearance well-groomed.    DATA REVIEW    US THYROID 11/4/2024 9:40 AM  COMPARISON: Thyroid ultrasound 11/21/2023  HISTORY: Thyroid nodule  FINDINGS:   Thyroid parenchyma: heterogenous  The right lobe of the thyroid measures: 4.0 x 1.7 x 1.6 cm  The left lobe of the thyroid measures: 3.6 x 1.6 x 1.9 cm  The thyroid isthmus measures: 0.29 cm     Nodule 1:  Lobe: Right  Location: Superior  Size: 1.0 x 0.7 x 0.5 cm, previously 1.1 x 0.8 x 0.4 cm  Composition: Solid or almost completely solid (2 points)  Echogenicity: Hyperechoic or isoechoic (1 point)  Shape: Wider than tall (0 points)  Margin: Ill-defined (0 points)  Echogenic Foci: Punctate echogenic foci (3 points)  Stability: No significant change in size  Other: Hypervascular  TIRADS: TR4 (4-6 points)      Nodule 2:  Lobe: Left  Location: Inferior  Size: 1.2 x 0.9 x 0.7 cm, previously 1.2 x 0.8 x 0.6 cm  Composition: Solid or almost completely solid (2 points)  Echogenicity: Hypoechoic (2 points)  Shape: Wider than tall (0 points)  Margin: Smooth (0 points)  Echogenic Foci: None or large comet tail artifact (0 points)  Stability: No significant change in size  Other: Hypervascular  TIRADS: TR4 (4-6 points)      Nodule 3:  Lobe: Left  Location: Inferior  Size: 0.9 x 0.7 x 0.3 cm, previously 0.9 x 0.8 x 0.4 cm  Composition: Solid or almost completely solid " (2 points)  Echogenicity: Hypoechoic (2 points)  Shape: Wider than tall (0 points)  Margin: Smooth (0 points)  Echogenic Foci: None or large comet tail artifact (0 points)  Stability: No significant change in size  TIRADS: TR4 (4-6 points)                                                                 Impression:  1. Nodule 1 and nodule 2 represent TR 4 thyroid nodules that meet size and imaging criteria for follow-up imaging at 1, 2, 3 and 5 years. This exam represents the 1 year follow-up.   VENKAT MARCOS DO        EXAMINATION: US HEAD NECK SOFT TISSUE, 11/4/2024 9:40 AM   COMPARISON: Soft tissue neck ultrasound 11/21/2023  HISTORY: Thyroid nodule  TECHNIQUE: Sonographic imaging performed of the neck to evaluate for lymph nodes using grey scale and limited Doppler technique.  FINDINGS:  Lymph nodes are measured bilaterally with measurements given in transverse, AP, and craniocaudal dimensions as follows:  Right:  Level 2: 1.4 x 0.5 x 1.4 cm lymph node with fatty hilum, similar in morphology to previous exam  Level 3: 0.8 x 0.5 x 0.9 cm oval lymph node not seen on previous exam  Level 4: 0.8 x 0.3 x 0.8 cm lymph node which previously measured 0.7 x  0.4 x 1.0 cm  Level 7: 0.8 x 0.6 x 0.7 cm oval node which previously measured 0.7 x 0.5 x 0.7 cm  1.3 x 0.7 x 0.8 cm round lymph node with large fatty hilum not  measured on previous exam  Left:  Level 2: 0.9 x 0.7 x 1.3 cm lymph node with fatty hilum reduced in overall size from comparison  0.8 x 0.5 x 1.2 cm oval lymph node which previously measured 0.6 x 0.4x 1.2 cm  Level 3: 1.0 x 0.5 x 1.8 cm lymph node which previously measured 0.9 x0.5 x 1.5 cm  Level 7: 1.3 x 0.5 x 0.9 cm lymph node with small fatty hilum, previously 1.2 x 0.5 x 0.8 cm                                                              IMPRESSION:  1.  Soft tissue neck ultrasound with lymph node measurements as described above.  2.  No discrete focal mass in the visualized soft tissues of the  neck.    VENKAT MARCOS,

## 2024-11-19 NOTE — NURSING NOTE
Current patient location: [address]   [facility] [id number]    Is the patient currently in the state of [place]? YES    Visit mode:VIDEO    If the visit is dropped, the patient can be reconnected by:VIDEO VISIT: Text to cell phone:   Telephone Information:   Mobile [phone]       Will anyone else be joining the visit? NO  (If patient encounters technical issues they should call [phone])    Are changes needed to the allergy or medication list? No, Pt stated no changes to allergies, and Pt stated no med changes    Are refills needed on medications prescribed by this physician? NO    Rooming Documentation:  Not applicable    Reason for visit: RECHECK    Radha GONZALEZ

## 2024-12-17 ENCOUNTER — LAB (OUTPATIENT)
Dept: LAB | Facility: CLINIC | Age: 74
End: 2024-12-17
Payer: MEDICARE

## 2024-12-17 ENCOUNTER — OFFICE VISIT (OUTPATIENT)
Dept: ALLERGY | Facility: CLINIC | Age: 74
End: 2024-12-17
Attending: INTERNAL MEDICINE
Payer: MEDICARE

## 2024-12-17 VITALS
WEIGHT: 168 LBS | BODY MASS INDEX: 26.51 KG/M2 | SYSTOLIC BLOOD PRESSURE: 139 MMHG | DIASTOLIC BLOOD PRESSURE: 84 MMHG | HEART RATE: 105 BPM | OXYGEN SATURATION: 97 %

## 2024-12-17 DIAGNOSIS — R09.82 PND (POST-NASAL DRIP): ICD-10-CM

## 2024-12-17 DIAGNOSIS — R09.81 NASAL CONGESTION: ICD-10-CM

## 2024-12-17 DIAGNOSIS — T78.40XA ALLERGY, UNSPECIFIED, INITIAL ENCOUNTER: ICD-10-CM

## 2024-12-17 DIAGNOSIS — R09.81 NASAL CONGESTION: Primary | ICD-10-CM

## 2024-12-17 DIAGNOSIS — R51.9 NONINTRACTABLE EPISODIC HEADACHE, UNSPECIFIED HEADACHE TYPE: ICD-10-CM

## 2024-12-17 PROCEDURE — 86003 ALLG SPEC IGE CRUDE XTRC EA: CPT

## 2024-12-17 PROCEDURE — 95004 PERQ TESTS W/ALRGNC XTRCS: CPT | Performed by: INTERNAL MEDICINE

## 2024-12-17 PROCEDURE — 99204 OFFICE O/P NEW MOD 45 MIN: CPT | Mod: 25 | Performed by: INTERNAL MEDICINE

## 2024-12-17 PROCEDURE — 36415 COLL VENOUS BLD VENIPUNCTURE: CPT

## 2024-12-17 NOTE — LETTER
12/17/2024      Yanely Jon  [address]  [facility] [address]      Dear Colleague,    Thank you for referring your patient, Yanely Jon, to the [facility] [place]. Please see a copy of my visit note below.    Yanely Jon was seen in the Allergy Clinic at [facility].    Yanely Jon is a 74 year old female being seen today at the request of Sherlyn Almanzar MD/[facility] in consultation for  Seasonal allergic rhinitis.    Ashtyn has never seen an allergist.  She has had at least 30 years of symptoms and has been taking allergy medications that entire time.  This includes Zyrtec, Flonase and Sudafed.  Zyrtec and Flonase is daily all year-round.  Sudafed is most days.      She stopped medications for the last 7 days and has had significant increase in itching.  She also has significant postnasal drainage and has actually been vomiting every other day which she feels is related to the significant postnasal drainage over the last 7 days.  She has had some nausea.  She did not have the symptoms while taking Zyrtec.    Even with medications she was only getting 50% improvement.  She has persistent nasal congestion, facial pressure, sneezing, postnasal drainage, headaches as well as eye itching.  She does not have any pets.  Symptoms are getting worse and are year-round.  She does have mild reflux symptoms and does not take any antireflux medications.      Past Medical History:   Diagnosis Date     Acrochordon      Atypical chest pain 1999    Normal stress test in 1999, performed for atypical chest pain     Benign nevus of skin     Numerous benign skin nevi     Biceps tendonitis      Bilateral bunions      Breast implant leak     Breast implants complicated by a leak. She then underwent replacement and repair.      Chronic fatigue      H/O urinary incontinence     Mild occasional female urinary incontinence which seems  to be both urge and stress related.     History of bilateral cataract extraction      Hx of tubal ligation      Left hip pain     Occasional left hip pain due to osteoarthritis and/or trochanteric bursitis.     Lipoma of back 2014    Chronic lipoma of the left back, status post-surgical resection in January 2014     Low back pain     Occasional low back pain with radicular symptoms into the legs.     Osteoarthritis of knees, bilateral      Osteoarthritis of left hip      Osteopenia 09/13/2013     Postmenopausal status      Prediabetes 02/2020     Pubic bone pain     Musculoskeletal pubic bone pain due to a strain.     Rosacea      Seborrheic keratoses      Skin lesions     Atypical melanocytic lesions, status post multiple biopsies by Dr. Rivera in July of 2015. These biposies showed a junctional nevus with moderate atypia, which is also a dysplastic nevus and the final one was also a compound nevus, with mild atypia which is also a dysplastic nevus.  She was contacted by Dr. Rivera and was told to follow up with her again in July 2016, for re-evaluation of her skin.     Skin tag      Solar elastosis      Solar lentiginosis      Sternal fracture 2015    Mid-sternal fracture after blunt trauma while falling while running up stepsin May 2015. She may have also had some rib fractures as well. These healed with conservative management.     Symptomatic varicose veins of both lower extremities 2015    Lower extremity venous varicosities, intermittently symptomatic, status post evaluation with Dr. Alonso in General Surgery in the summer of 2015.     Symptomatic varicose veins of both lower extremities     Status post injection and compression sclerotherapy     Syncope 2018     Urticaria     Questionable exercise-induced urticaria     Family History   Problem Relation Age of Onset     Osteoarthritis Mother      Cerebrovascular Disease Mother 95     Osteoporosis Mother      Hip fracture Mother      Bladder Cancer Father 67      Breast Cancer Sister 53     Other - See Comments Sister         colon resetion, cause unclear     Heart Disease Brother 50        stents, was smoker     Hyperlipidemia Maternal Aunt      Cerebrovascular Disease Maternal Aunt      Hyperlipidemia Maternal Aunt      Thyroid Disease No family hx of      Thyroid Cancer No family hx of      Past Surgical History:   Procedure Laterality Date     APPENDECTOMY       BUNIONECTOMY Bilateral 10/2017     CATARACT IOL, RT/LT Right      COLONOSCOPY  2014    repeat in 10 yr     COLONOSCOPY N/A 10/3/2024    Procedure: Colonoscopy;  Surgeon: Angely Forman MD;  [place] [facility] [place]     DENTAL SURGERY       MAMMOPLASTY AUGMENTATION Bilateral 1972     RHYTIDECTOMY (FACELIFT)  01/23/2014    neck     SCLEROTHERAPY  10/03/2014    varicose veins     SURGICAL PATHOLOGY EXAM  01/13/2014    lower back     TONSILLECTOMY       TOTAL KNEE ARTHROPLASTY Right 12/2021     TUBAL LIGATION         ENVIRONMENTAL HISTORY:   Pets inside the house include None.  Do you smoke cigarettes or other recreational drugs? No There is/are 0 smokers living in the house. The house does not have a damp basement.     SOCIAL HISTORY:   [place]ia is retired. She lives with her .      Review of Systems      Current Outpatient Medications:      alendronate (FOSAMAX) 70 MG tablet, Take 1 tablet (70 mg) by mouth every 7 days, Disp: 12 tablet, Rfl: 3     atorvastatin (LIPITOR) 10 MG tablet, Take 1 tablet (10 mg) by mouth daily, Disp: 90 tablet, Rfl: 3     calcium carbonate 600 mg-vitamin D 400 units (CALTRATE) 600-400 MG-UNIT per tablet, Take 1 tablet by mouth 2 times daily, Disp: 180 tablet, Rfl: 3     celecoxib (CELEBREX) 200 MG capsule, Take 1 capsule (200 mg) by mouth daily, Disp: 90 capsule, Rfl: 3     cetirizine (ZYRTEC) 10 MG tablet, Take 1 tablet (10 mg) by mouth daily, Disp: 90 tablet, Rfl: 3     DULoxetine (CYMBALTA) 30 MG capsule, , Disp: , Rfl:      metroNIDAZOLE (METROGEL) 1 % gel, , Disp: , Rfl:      Omega-3  Fatty Acids (FISH OIL PO), , Disp: , Rfl:      tretinoin (RETIN-A) 0.1 % cream, Apply QHS to face for rosacea, Disp: , Rfl:      VITAMIN D PO, Take 4,000 Units by mouth daily, Disp: , Rfl:   Allergies   Allergen Reactions     Cheese      Fentanyl Other (See Comments)     May have caused pt. To pass out after cataract      Methohexital Other (See Comments)     May have caused pt. To pass out after cataract      Midazolam Other (See Comments)     May have caused pt. To pass out after cataract surgery     Seasonal Allergies          EXAM:   /84   Pulse 105   Wt 76.2 kg (168 lb)   SpO2 97%   BMI 26.51 kg/m      Physical Exam    Constitutional:       General: She is not in acute distress.     Appearance: Normal appearance. She is not ill-appearing.   HENT:      Head: Normocephalic and atraumatic.      Nose: She does have bilateral nasal turbinate hypertrophy     Mouth/Throat:      Mouth: Mucous membranes are moist.      Pharynx: Oropharynx is clear. No posterior oropharyngeal erythema.   Eyes:      General:         Right eye: No discharge.         Left eye: No discharge.   Cardiovascular:      Rate and Rhythm: Normal rate and regular rhythm.      Heart sounds: Normal heart sounds.   Pulmonary:      Effort: Pulmonary effort is normal.      Breath sounds: Normal breath sounds. No wheezing or rhonchi.   Skin:     General: Skin is warm.      Findings: No erythema or rash.   Neurological:      General: No focal deficit present.      Mental Status: She is alert. Mental status is at baseline.   Psychiatric:         Mood and Affect: Mood normal.         Behavior: Behavior normal.      Skin testing was only positive to [place].    ENVIRONMENTAL PERCUTANEOUS SKIN TESTING: ADULT      12/17/2024     1:00 PM   [facility]   Consent Y   Ordering Physician Dr. Ya   Interpreting Physician Dr. Ya   Testing Technician Heather MORENO RN   Location Back   Time start: 13:50   Time End: 14:05   Positive Control:  Histatrol*ALK 1 mg/ml 5/20   Negative Control: 50% Glycerin 0   Cat Hair*ALK (10,000 BAU/ml) 0   AP Dog Hair/Dander (1:100 w/v) 0   Dust Mite p. 30,000 AU/ml 0   Dust Mite f. (30,000 AU/ml) 0   Eusebio (W/F in millimeters) 0   Ross Grass (100,000 BAU/mL) 0   Red Cedar (W/F in millimeters) 0   Maple/[place] (W/F in millimeters) 0   Hackberry (W/F in millimeters) 0   Blanco (W/F in millimeters) 0   [place] *ALK (W/F in millimeters) 0   American Elm (W/F in millimeters) 0   [place] (W/F in millimeters) 3/10   Black [place] (W/F in millimeters) 0   Birch Mix (W/F in millimeters) 0   [place] (W/F in millimeters) 0   Oak (W/F in millimeters) 0   Cocklebur (W/F in millimeters) 0   [place] (W/F in millimeters) 0   White [place] (W/F in millimeters) 0   Careless (W/F in millimeters) 0   Nettle (W/F in millimeters) 0   English Plantain (W/F in millimeters) 0   Kochia (W/F in millimeters) 0   Lamb's Quarter (W/F in millimeters) 0   Marshelder (W/F in millimeters) 0   Ragweed Mix* ALK (W/F in millimeters) 0   Russian Thistle (W/F in millimeters) 0   Sagebrush/Mugwort (W/F in millimeters) 0   Sheep Sorrel (W/F in millimeters) 0   Feather Mix* ALK (W/F in millimeters) 0   Penicillium Mix (1:10 w/v) 0   Curvularia spicifera (1:10 w/v) 0   Epicoccum (1:10 w/v) 0   Aspergillus fumigatus (1:10 w/v): 0   Alternaria tenius (1:10 w/v) 0   H. Cladosporium (1:10 w/v) 0   Phoma herbarum (1:10 w/v) 0         ASSESSMENT/PLAN:  Yanely Jon is a 74 year old female seen today for symptoms consistent with allergies.  Skin testing was only positive to 1 tree which is not contributing to her year-round symptoms.    Labs ordered  Use Flonase with Astepro 1 spray of each twice daily for the next 2 weeks, then once daily  Sudafed as needed  Zyrtec 10 mg at night, and may take as needed for hives.  Sinus CT to evaluate for sinus disease  Will consider GERD therapy    Follow-up in 1 month      Thank you for allowing me to participate in  the care of Yanely Jon.      I spent 30 minutes on the date of the encounter doing chart review, history and exam, documentation and further coordination as noted above exclusive of separately reported interpretations    Jairo Ya MD  Allergy/Immunology  [facility]      Per provider verbal order, placed Adult Environmental Panel scratch test.  Verbal consent was obtained by MD prior to procedure.  Once panels were placed, patient was monitored for 15 minutes in clinic.  Provider read test after 15 minutes.  Pt tolerated procedure well.  All questions and concerns were addressed at office visit.       Heather Moreau RN      Again, thank you for allowing me to participate in the care of your patient.        Sincerely,        Jairo Ya MD

## 2024-12-17 NOTE — PROGRESS NOTES
Per provider verbal order, placed Adult Environmental Panel scratch test.  Verbal consent was obtained by MD prior to procedure.  Once panels were placed, patient was monitored for 15 minutes in clinic.  Provider read test after 15 minutes.  Pt tolerated procedure well.  All questions and concerns were addressed at office visit.       Heather Moreau RN

## 2024-12-17 NOTE — PROGRESS NOTES
Yanely Jon was seen in the Allergy Clinic at [facility].    Yanely Jon is a 74 year old female being seen today at the request of Sherlyn Almanzar MD/[facility] in consultation for  Seasonal allergic rhinitis.    Ashtyn has never seen an allergist.  She has had at least 30 years of symptoms and has been taking allergy medications that entire time.  This includes Zyrtec, Flonase and Sudafed.  Zyrtec and Flonase is daily all year-round.  Sudafed is most days.      She stopped medications for the last 7 days and has had significant increase in itching.  She also has significant postnasal drainage and has actually been vomiting every other day which she feels is related to the significant postnasal drainage over the last 7 days.  She has had some nausea.  She did not have the symptoms while taking Zyrtec.    Even with medications she was only getting 50% improvement.  She has persistent nasal congestion, facial pressure, sneezing, postnasal drainage, headaches as well as eye itching.  She does not have any pets.  Symptoms are getting worse and are year-round.  She does have mild reflux symptoms and does not take any antireflux medications.      Past Medical History:   Diagnosis Date    Acrochordon     Atypical chest pain 1999    Normal stress test in 1999, performed for atypical chest pain    Benign nevus of skin     Numerous benign skin nevi    Biceps tendonitis     Bilateral bunions     Breast implant leak     Breast implants complicated by a leak. She then underwent replacement and repair.     Chronic fatigue     H/O urinary incontinence     Mild occasional female urinary incontinence which seems to be both urge and stress related.    History of bilateral cataract extraction     Hx of tubal ligation     Left hip pain     Occasional left hip pain due to osteoarthritis and/or trochanteric bursitis.    Lipoma of back 2014    Chronic lipoma of the left back, status  post-surgical resection in January 2014    Low back pain     Occasional low back pain with radicular symptoms into the legs.    Osteoarthritis of knees, bilateral     Osteoarthritis of left hip     Osteopenia 09/13/2013    Postmenopausal status     Prediabetes 02/2020    Pubic bone pain     Musculoskeletal pubic bone pain due to a strain.    Rosacea     Seborrheic keratoses     Skin lesions     Atypical melanocytic lesions, status post multiple biopsies by Dr. Rivera in July of 2015. These biposies showed a junctional nevus with moderate atypia, which is also a dysplastic nevus and the final one was also a compound nevus, with mild atypia which is also a dysplastic nevus.  She was contacted by Dr. Rivera and was told to follow up with her again in July 2016, for re-evaluation of her skin.    Skin tag     Solar elastosis     Solar lentiginosis     Sternal fracture 2015    Mid-sternal fracture after blunt trauma while falling while running up stepsin May 2015. She may have also had some rib fractures as well. These healed with conservative management.    Symptomatic varicose veins of both lower extremities 2015    Lower extremity venous varicosities, intermittently symptomatic, status post evaluation with Dr. Alonso in General Surgery in the summer of 2015.    Symptomatic varicose veins of both lower extremities     Status post injection and compression sclerotherapy    Syncope 2018    Urticaria     Questionable exercise-induced urticaria     Family History   Problem Relation Age of Onset    Osteoarthritis Mother     Cerebrovascular Disease Mother 95    Osteoporosis Mother     Hip fracture Mother     Bladder Cancer Father 67    Breast Cancer Sister 53    Other - See Comments Sister         colon resetion, cause unclear    Heart Disease Brother 50        stents, was smoker    Hyperlipidemia Maternal Aunt     Cerebrovascular Disease Maternal Aunt     Hyperlipidemia Maternal Aunt     Thyroid Disease No family hx of      Thyroid Cancer No family hx of      Past Surgical History:   Procedure Laterality Date    APPENDECTOMY      BUNIONECTOMY Bilateral 10/2017    CATARACT IOL, RT/LT Right     COLONOSCOPY  2014    repeat in 10 yr    COLONOSCOPY N/A 10/3/2024    Procedure: Colonoscopy;  Surgeon: Angely Forman MD;  [place] [facility] [place]    DENTAL SURGERY      MAMMOPLASTY AUGMENTATION Bilateral 1972    RHYTIDECTOMY (FACELIFT)  01/23/2014    neck    SCLEROTHERAPY  10/03/2014    varicose veins    SURGICAL PATHOLOGY EXAM  01/13/2014    lower back    TONSILLECTOMY      TOTAL KNEE ARTHROPLASTY Right 12/2021    TUBAL LIGATION         ENVIRONMENTAL HISTORY:   Pets inside the house include None.  Do you smoke cigarettes or other recreational drugs? No There is/are 0 smokers living in the house. The house does not have a damp basement.     SOCIAL HISTORY:   [place] is retired. She lives with her .      Review of Systems      Current Outpatient Medications:     alendronate (FOSAMAX) 70 MG tablet, Take 1 tablet (70 mg) by mouth every 7 days, Disp: 12 tablet, Rfl: 3    atorvastatin (LIPITOR) 10 MG tablet, Take 1 tablet (10 mg) by mouth daily, Disp: 90 tablet, Rfl: 3    calcium carbonate 600 mg-vitamin D 400 units (CALTRATE) 600-400 MG-UNIT per tablet, Take 1 tablet by mouth 2 times daily, Disp: 180 tablet, Rfl: 3    celecoxib (CELEBREX) 200 MG capsule, Take 1 capsule (200 mg) by mouth daily, Disp: 90 capsule, Rfl: 3    cetirizine (ZYRTEC) 10 MG tablet, Take 1 tablet (10 mg) by mouth daily, Disp: 90 tablet, Rfl: 3    DULoxetine (CYMBALTA) 30 MG capsule, , Disp: , Rfl:     metroNIDAZOLE (METROGEL) 1 % gel, , Disp: , Rfl:     Omega-3 Fatty Acids (FISH OIL PO), , Disp: , Rfl:     tretinoin (RETIN-A) 0.1 % cream, Apply QHS to face for rosacea, Disp: , Rfl:     VITAMIN D PO, Take 4,000 Units by mouth daily, Disp: , Rfl:   Allergies   Allergen Reactions    Cheese     Fentanyl Other (See Comments)     May have caused pt. To pass out after cataract      Methohexital Other (See Comments)     May have caused pt. To pass out after cataract     Midazolam Other (See Comments)     May have caused pt. To pass out after cataract surgery    Seasonal Allergies          EXAM:   /84   Pulse 105   Wt 76.2 kg (168 lb)   SpO2 97%   BMI 26.51 kg/m      Physical Exam    Constitutional:       General: She is not in acute distress.     Appearance: Normal appearance. She is not ill-appearing.   HENT:      Head: Normocephalic and atraumatic.      Nose: She does have bilateral nasal turbinate hypertrophy     Mouth/Throat:      Mouth: Mucous membranes are moist.      Pharynx: Oropharynx is clear. No posterior oropharyngeal erythema.   Eyes:      General:         Right eye: No discharge.         Left eye: No discharge.   Cardiovascular:      Rate and Rhythm: Normal rate and regular rhythm.      Heart sounds: Normal heart sounds.   Pulmonary:      Effort: Pulmonary effort is normal.      Breath sounds: Normal breath sounds. No wheezing or rhonchi.   Skin:     General: Skin is warm.      Findings: No erythema or rash.   Neurological:      General: No focal deficit present.      Mental Status: She is alert. Mental status is at baseline.   Psychiatric:         Mood and Affect: Mood normal.         Behavior: Behavior normal.      Skin testing was only positive to [place].    ENVIRONMENTAL PERCUTANEOUS SKIN TESTING: ADULT      12/17/2024     1:00 PM   [place] [facility]   Consent Y   Ordering Physician Dr. Ya   Interpreting Physician Dr. Ya   Testing Technician Heather MORENO RN   Location Back   Time start: 13:50   Time End: 14:05   Positive Control: Histatrol*ALK 1 mg/ml 5/20   Negative Control: 50% Glycerin 0   Cat Hair*ALK (10,000 BAU/ml) 0   AP Dog Hair/Dander (1:100 w/v) 0   Dust Mite p. 30,000 AU/ml 0   Dust Mite f. (30,000 AU/ml) 0   Eusebio (W/F in millimeters) 0   Ross Grass (100,000 BAU/mL) 0   Red Cedar (W/F in millimeters) 0   Maple/[place] (W/F in  millimeters) 0   Hackberry (W/F in millimeters) 0   [place] (W/F in millimeters) 0   [place] *ALK (W/F in millimeters) 0   American Elm (W/F in millimeters) 0   [place] (W/F in millimeters) 3/10   Black [place] (W/F in millimeters) 0   Birch Mix (W/F in millimeters) 0   [place] (W/F in millimeters) 0   Oak (W/F in millimeters) 0   Cocklebur (W/F in millimeters) 0   [place] (W/F in millimeters) 0   White [place] Angel (W/F in millimeters) 0   Careless (W/F in millimeters) 0   Nettle (W/F in millimeters) 0   English Plantain (W/F in millimeters) 0   Kochia (W/F in millimeters) 0   Lamb's Quarter (W/F in millimeters) 0   Marshelder (W/F in millimeters) 0   Ragweed Mix* ALK (W/F in millimeters) 0   Russian Thistle (W/F in millimeters) 0   Sagebrush/Mugwort (W/F in millimeters) 0   Sheep Sorrel (W/F in millimeters) 0   Feather Mix* ALK (W/F in millimeters) 0   Penicillium Mix (1:10 w/v) 0   Curvularia spicifera (1:10 w/v) 0   Epicoccum (1:10 w/v) 0   Aspergillus fumigatus (1:10 w/v): 0   Alternaria tenius (1:10 w/v) 0   H. Cladosporium (1:10 w/v) 0   Phoma herbarum (1:10 w/v) 0         ASSESSMENT/PLAN:  Yanely Jon is a 74 year old female seen today for symptoms consistent with allergies.  Skin testing was only positive to 1 tree which is not contributing to her year-round symptoms.    Labs ordered  Use Flonase with Astepro 1 spray of each twice daily for the next 2 weeks, then once daily  Sudafed as needed  Zyrtec 10 mg at night, and may take as needed for hives.  Sinus CT to evaluate for sinus disease  Will consider GERD therapy    Follow-up in 1 month      Thank you for allowing me to participate in the care of Yanely Jon.      I spent 30 minutes on the date of the encounter doing chart review, history and exam, documentation and further coordination as noted above exclusive of separately reported interpretations    Jaior Ya MD  Allergy/Immunology  [facility]

## 2024-12-17 NOTE — PATIENT INSTRUCTIONS
Labs ordered  Use Flonase with Astepro 1 spray of each twice daily for the next 2 weeks, then once daily  Sudafed as needed  Zyrtec 10 mg at night, and may take as needed for hives.  Sinus CT to evaluate for sinus disease  Will consider GERD therapy        Allergy Staff Appt Hours Shot Hours Location       Physician   Jairo Ya MD      Support Staff   GARFIELD Willis RN, MA Emily J., MA      Mondays Tuesdays Thursdays and Fridays:      [place] 7-5 Wednesdays         Close                Mondays, Tuesdays and Fridays:  7:20 - 3:40              [facility]  [address]  [place] [address]  Allergy appointment  line: [phone]    Pulmonary Function Scheduling:  [place]: [phone]

## 2024-12-19 LAB
A ALTERNATA IGE QN: <0.1 KU(A)/L
A FUMIGATUS IGE QN: <0.1 KU(A)/L
D FARINAE IGE QN: <0.1 KU(A)/L
D PTERONYSS IGE QN: <0.1 KU(A)/L
TIMOTHY IGE QN: <0.1 KU(A)/L

## 2024-12-26 ENCOUNTER — ANCILLARY PROCEDURE (OUTPATIENT)
Dept: CT IMAGING | Facility: CLINIC | Age: 74
End: 2024-12-26
Attending: INTERNAL MEDICINE
Payer: MEDICARE

## 2024-12-26 ENCOUNTER — TELEPHONE (OUTPATIENT)
Dept: ALLERGY | Facility: CLINIC | Age: 74
End: 2024-12-26

## 2024-12-26 DIAGNOSIS — R09.81 NASAL CONGESTION: ICD-10-CM

## 2024-12-26 DIAGNOSIS — R09.82 PND (POST-NASAL DRIP): ICD-10-CM

## 2024-12-26 DIAGNOSIS — J32.0 CHRONIC MAXILLARY SINUSITIS: Primary | ICD-10-CM

## 2024-12-26 DIAGNOSIS — R51.9 NONINTRACTABLE EPISODIC HEADACHE, UNSPECIFIED HEADACHE TYPE: ICD-10-CM

## 2024-12-26 PROCEDURE — 70486 CT MAXILLOFACIAL W/O DYE: CPT | Mod: MG | Performed by: STUDENT IN AN ORGANIZED HEALTH CARE EDUCATION/TRAINING PROGRAM

## 2024-12-26 PROCEDURE — G1010 CDSM STANSON: HCPCS | Performed by: STUDENT IN AN ORGANIZED HEALTH CARE EDUCATION/TRAINING PROGRAM

## 2024-12-26 RX ORDER — PREDNISONE 10 MG/1
TABLET ORAL
Qty: 30 TABLET | Refills: 0 | Status: SHIPPED | OUTPATIENT
Start: 2024-12-26

## 2024-12-26 RX ORDER — DOXYCYCLINE 100 MG/1
100 CAPSULE ORAL 2 TIMES DAILY
Qty: 20 CAPSULE | Refills: 0 | Status: SHIPPED | OUTPATIENT
Start: 2024-12-26 | End: 2025-01-05

## 2024-12-26 NOTE — TELEPHONE ENCOUNTER
RN attempted to call patient back and let her know that both  antibiotic and prednisone rRx were sent to her pharmacy. Patient was not available to speak with, her  stated that she would be back later today to speak with. RN will remind patient to withhold Calcium supplement while she is on antibiotic as it may decrease the effectiveness per Dr. Ya. RN will call back later today.    Heather Moreau RN

## 2024-12-26 NOTE — RESULT ENCOUNTER NOTE
Please call.    The sinus CT scan shows evidence of maxillary sinusitis.  That could be contributing to your symptoms.  1 option is to consider a course of prednisone with possible antibiotics.  I also would like to refer you to an ENT provider.  Let me know your thoughts on the referral and also if you would like to receive treatment to see if we can improve your symptoms.

## 2024-12-26 NOTE — TELEPHONE ENCOUNTER
RN called and relayed Dr. Ya's message about patients sinus CT scan results. Patient's questions were answered to the best of nurses ability. Patient would like to proceed with a course of prednisone and antibiotics if Dr. Ya feels antibiotics. Patient is also interested in ENT referral. Patient would like a callback to confirm if antibiotic were ordered or just prednisone and patient would like Rx sent to [facility] pharmacy in [place].     RN pended referral for ENT for provider review. Routing message back to Dr. Ya for Rx.    Heather Moreau RN

## 2024-12-26 NOTE — TELEPHONE ENCOUNTER
----- Message from Jairo Ya sent at 12/26/2024 10:34 AM CST -----  Please call.    The sinus CT scan shows evidence of maxillary sinusitis.  That could be contributing to your symptoms.  1 option is to consider a course of prednisone with possible antibiotics.  I also would like to refer you to an ENT provider.  Let me know your thoughts on the referral and also if you would like to receive treatment to see if we can improve your symptoms.     My signature below certifies that the above stated patient is homebound and upon completion of the Face-To-Face encounter, has the need for intermittent skilled nursing, physical therapy and/or speech or occupational therapy services in their home for their current diagnosis as outlined in their initial plan of care. These services will continue to be monitored by myself or another physician.

## 2024-12-30 NOTE — TELEPHONE ENCOUNTER
"FUTURE VISIT INFORMATION      FUTURE VISIT INFORMATION:  Date: 3/24/25  Time: 3:30Pm  [place] [facility]  REFERRAL INFORMATION:  Referring provider:   Jairo Ya  Referring providers clinic:  [facility]   Reason for visit/diagnosis  J32.0 (ICD-10-CM) - Chronic maxillary sinusitis, referral from Jairo Ya, Referral notes in EPIC. Pt made appt for [facility] location     RECORDS REQUESTED FROM:       Clinic name Comments Records Status Imaging Status   [facility] 12/17/24- Ov madeline Ya Epic     Imaging  12/26/24-  CT Sinus   11/4/24- US Head Neck  [facility]  PACS            \"Please notify/message CSS if patient completed outside imaging prior to scheduled appointment and/or any outside records that might have been missed at pre visit -Thanks\"  "

## 2025-01-23 ENCOUNTER — ANCILLARY PROCEDURE (OUTPATIENT)
Dept: ULTRASOUND IMAGING | Facility: CLINIC | Age: 75
End: 2025-01-23
Payer: MEDICARE

## 2025-01-23 DIAGNOSIS — R59.0 CERVICAL ADENOPATHY: ICD-10-CM

## 2025-01-23 DIAGNOSIS — E04.1 THYROID NODULE: ICD-10-CM

## 2025-01-23 DIAGNOSIS — Z92.3 PERSONAL HISTORY OF IRRADIATION, PRESENTING HAZARDS TO HEALTH: ICD-10-CM

## 2025-01-23 PROCEDURE — 88173 CYTOPATH EVAL FNA REPORT: CPT | Mod: 26 | Performed by: PATHOLOGY

## 2025-01-23 PROCEDURE — 88172 CYTP DX EVAL FNA 1ST EA SITE: CPT | Mod: TC

## 2025-01-23 PROCEDURE — 88184 FLOWCYTOMETRY/ TC 1 MARKER: CPT | Performed by: STUDENT IN AN ORGANIZED HEALTH CARE EDUCATION/TRAINING PROGRAM

## 2025-01-23 PROCEDURE — 88184 FLOWCYTOMETRY/ TC 1 MARKER: CPT

## 2025-01-23 PROCEDURE — 88185 FLOWCYTOMETRY/TC ADD-ON: CPT

## 2025-01-23 PROCEDURE — 86800 THYROGLOBULIN ANTIBODY: CPT

## 2025-01-23 PROCEDURE — 10005 FNA BX W/US GDN 1ST LES: CPT | Performed by: STUDENT IN AN ORGANIZED HEALTH CARE EDUCATION/TRAINING PROGRAM

## 2025-01-23 PROCEDURE — 88172 CYTP DX EVAL FNA 1ST EA SITE: CPT | Mod: 26 | Performed by: PATHOLOGY

## 2025-01-23 PROCEDURE — 84432 ASSAY OF THYROGLOBULIN: CPT

## 2025-01-23 PROCEDURE — 88188 FLOWCYTOMETRY/READ 9-15: CPT | Performed by: STUDENT IN AN ORGANIZED HEALTH CARE EDUCATION/TRAINING PROGRAM

## 2025-01-23 PROCEDURE — 10006 FNA BX W/US GDN EA ADDL: CPT | Performed by: STUDENT IN AN ORGANIZED HEALTH CARE EDUCATION/TRAINING PROGRAM

## 2025-01-23 RX ORDER — LIDOCAINE HYDROCHLORIDE 10 MG/ML
5 INJECTION, SOLUTION INFILTRATION; PERINEURAL ONCE
Status: COMPLETED | OUTPATIENT
Start: 2025-01-23 | End: 2025-01-23

## 2025-01-23 RX ADMIN — LIDOCAINE HYDROCHLORIDE 5 ML: 10 INJECTION, SOLUTION INFILTRATION; PERINEURAL at 09:31

## 2025-01-24 LAB

## 2025-01-27 ENCOUNTER — OFFICE VISIT (OUTPATIENT)
Dept: ALLERGY | Facility: CLINIC | Age: 75
End: 2025-01-27
Attending: INTERNAL MEDICINE
Payer: MEDICARE

## 2025-01-27 VITALS
BODY MASS INDEX: 27.25 KG/M2 | DIASTOLIC BLOOD PRESSURE: 77 MMHG | OXYGEN SATURATION: 98 % | SYSTOLIC BLOOD PRESSURE: 131 MMHG | HEART RATE: 111 BPM | WEIGHT: 172.7 LBS

## 2025-01-27 DIAGNOSIS — R51.9 NONINTRACTABLE EPISODIC HEADACHE, UNSPECIFIED HEADACHE TYPE: ICD-10-CM

## 2025-01-27 DIAGNOSIS — J32.0 CHRONIC MAXILLARY SINUSITIS: Primary | ICD-10-CM

## 2025-01-27 DIAGNOSIS — R09.81 NASAL CONGESTION: ICD-10-CM

## 2025-01-27 DIAGNOSIS — R09.82 PND (POST-NASAL DRIP): ICD-10-CM

## 2025-01-27 PROCEDURE — 99214 OFFICE O/P EST MOD 30 MIN: CPT | Performed by: INTERNAL MEDICINE

## 2025-01-27 RX ORDER — PREDNISONE 10 MG/1
TABLET ORAL
Qty: 30 TABLET | Refills: 0 | Status: SHIPPED | OUTPATIENT
Start: 2025-01-27

## 2025-01-27 NOTE — PATIENT INSTRUCTIONS
Allergy Staff Appt Hours Shot Hours Location       Physician   Jairo Ya MD      Support Staff   GARFIELD Willis RN, MA Emily J., MA      Mondays Tuesdays Thursdays and Fridays:      Yesenia 7-5      Wednesdays         Close                Mondays, Tuesdays and Fridays:  7:20 - 3:40              [facility]  [address]  [place] [address]  Allergy appointment  line: [phone]    Pulmonary Function Scheduling:  [place]: [phone]           Questions about cost of your care  For questions about your cost of your visit, procedure, lab or imaging contact: [employer] Line [phone] or visit:  [email or web address]    Prescription Assistance  If you need assistance with your prescriptions (cost, coverage, etc) please contact: [facility] Prescription Assistance Program [phone]    Important Scheduling Information  All visits for food challenges, medication/drug allergy testing, and drug challenges MUST be scheduled through the allergy clinic nurse. Please contact them via Ku or by calling the clinic at [phone] and asking to speak with an allergy nurse. They will provide additional information and instructions for the appointment. Discontinue oral antihistamines 7 days prior to the appointment. Discontinue nasal and ocular antihistamines 1 day prior to the appointment.    Appointments for skin testing: Appointment will last approximately 45 minutes.  Please call the appointment line for your clinic to schedule.  Discontinue oral antihistamines 7 days prior to the appointment.  Discontinue nasal and ocular antihistamines 1 days prior to appointment.    Thank you for trusting us with your care. Please feel free to contact us with any questions or concerns you may have.

## 2025-01-27 NOTE — LETTER
1/27/2025      Yanely Jon  [address]  [facility] [address]      Dear Colleague,    Thank you for referring your patient, Yanely Jon, to the [facility] [place]. Please see a copy of my visit note below.    Yanely Jon was seen in the Allergy Clinic at [facility].    Yanely Jon is a 74 year old female being seen today for ongoing evaluation of allergic rhinitis to trees with recently identified left-sided maxillary sinusitis.    She continues to take Zyrtec-D morning and evening and occasionally additional Zyrtec with Flonase.  The Flonase is also daily.  Skin testing at the last appointment was positive to 1 tree [place].    Sinus CT scan was obtained which did show significant left maxillary sinusitis.  She was treated with doxycycline and prednisone and does not recall any significant benefit.  She was referred to ENT with appointment in March.    CT SCAN FINDINGS:   Maxillary sinuses: Clear right maxillary sinus. Subtotal opacification  of the left maxillary sinus with inspissated mucus and scattered  calcifications. There is contraction of the left maxillary sinus with  osseous thickening of the maxillary buttresses. No definite remodeling  of the maxillary sinus ostium.     The imaged skull base, intracranial and orbital structures are within  normal limits                                                                      IMPRESSION:   Chronic left maxillary sinusitis. The remainder of the paranasal  sinuses are unremarkable.    PAST ALLERGY HISTORY:    She has had at least 30 years of symptoms and has been taking allergy medications that entire time.  This includes Zyrtec, Flonase and Sudafed.  Zyrtec and Flonase is daily all year-round.  Sudafed is most days.      She also has significant postnasal drainage and has actually been vomiting every other day which she feels is related to the significant  postnasal drainage over the last 7 days.  She has had some nausea.  She did not have these symptoms while taking Zyrtec.    With medications she only is getting 50% improvement.  She has persistent nasal congestion, facial pressure, sneezing, postnasal drainage, headaches as well as eye itching.  She does not have any pets.  Symptoms are getting worse and are year-round.      She does have mild reflux symptoms and takes omeprazole as needed    Past Medical History:   Diagnosis Date     Acrochordon      Atypical chest pain 1999    Normal stress test in 1999, performed for atypical chest pain     Benign nevus of skin     Numerous benign skin nevi     Biceps tendonitis      Bilateral bunions      Breast implant leak     Breast implants complicated by a leak. She then underwent replacement and repair.      Chronic fatigue      H/O urinary incontinence     Mild occasional female urinary incontinence which seems to be both urge and stress related.     History of bilateral cataract extraction      Hx of tubal ligation      Left hip pain     Occasional left hip pain due to osteoarthritis and/or trochanteric bursitis.     Lipoma of back 2014    Chronic lipoma of the left back, status post-surgical resection in January 2014     Low back pain     Occasional low back pain with radicular symptoms into the legs.     Osteoarthritis of knees, bilateral      Osteoarthritis of left hip      Osteopenia 09/13/2013     Postmenopausal status      Prediabetes 02/2020     Pubic bone pain     Musculoskeletal pubic bone pain due to a strain.     Rosacea      Seborrheic keratoses      Skin lesions     Atypical melanocytic lesions, status post multiple biopsies by Dr. Rivera in July of 2015. These biposies showed a junctional nevus with moderate atypia, which is also a dysplastic nevus and the final one was also a compound nevus, with mild atypia which is also a dysplastic nevus.  She was contacted by Dr. Rivera and was told to follow up with  her again in July 2016, for re-evaluation of her skin.     Skin tag      Solar elastosis      Solar lentiginosis      Sternal fracture 2015    Mid-sternal fracture after blunt trauma while falling while running up stepsin May 2015. She may have also had some rib fractures as well. These healed with conservative management.     Symptomatic varicose veins of both lower extremities 2015    Lower extremity venous varicosities, intermittently symptomatic, status post evaluation with Dr. Alonso in General Surgery in the summer of 2015.     Symptomatic varicose veins of both lower extremities     Status post injection and compression sclerotherapy     Syncope 2018     Urticaria     Questionable exercise-induced urticaria     Family History   Problem Relation Age of Onset     Osteoarthritis Mother      Cerebrovascular Disease Mother 95     Osteoporosis Mother      Hip fracture Mother      Bladder Cancer Father 67     Breast Cancer Sister 53     Other - See Comments Sister         colon resetion, cause unclear     Heart Disease Brother 50        stents, was smoker     Hyperlipidemia Maternal Aunt      Cerebrovascular Disease Maternal Aunt      Hyperlipidemia Maternal Aunt      Thyroid Disease No family hx of      Thyroid Cancer No family hx of      Past Surgical History:   Procedure Laterality Date     APPENDECTOMY       BUNIONECTOMY Bilateral 10/2017     CATARACT IOL, RT/LT Right      COLONOSCOPY  2014    repeat in 10 yr     COLONOSCOPY N/A 10/3/2024    Procedure: Colonoscopy;  Surgeon: Angely Forman MD;  Location: [facility] OR     DENTAL SURGERY       MAMMOPLASTY AUGMENTATION Bilateral 1972     RHYTIDECTOMY (FACELIFT)  01/23/2014    neck     SCLEROTHERAPY  10/03/2014    varicose veins     SURGICAL PATHOLOGY EXAM  01/13/2014    lower back     TONSILLECTOMY       TOTAL KNEE ARTHROPLASTY Right 12/2021     TUBAL LIGATION           Current Outpatient Medications:      alendronate (FOSAMAX) 70 MG tablet, Take 1 tablet (70 mg) by  mouth every 7 days, Disp: 12 tablet, Rfl: 3     amoxicillin-clavulanate (AUGMENTIN) 875-125 MG tablet, Take 1 tablet by mouth 2 times daily for 10 days., Disp: 20 tablet, Rfl: 0     atorvastatin (LIPITOR) 10 MG tablet, Take 1 tablet (10 mg) by mouth daily, Disp: 90 tablet, Rfl: 3     calcium carbonate 600 mg-vitamin D 400 units (CALTRATE) 600-400 MG-UNIT per tablet, Take 1 tablet by mouth 2 times daily, Disp: 180 tablet, Rfl: 3     celecoxib (CELEBREX) 200 MG capsule, Take 1 capsule (200 mg) by mouth daily, Disp: 90 capsule, Rfl: 3     cetirizine (ZYRTEC) 10 MG tablet, Take 1 tablet (10 mg) by mouth daily, Disp: 90 tablet, Rfl: 3     DULoxetine (CYMBALTA) 30 MG capsule, , Disp: , Rfl:      metroNIDAZOLE (METROGEL) 1 % gel, , Disp: , Rfl:      Omega-3 Fatty Acids (FISH OIL PO), , Disp: , Rfl:      predniSONE (DELTASONE) 10 MG tablet, Take 3 tabs daily x 5 days, 2 tabs daily x 5 days, then 1 tab daily x 5 days, Disp: 30 tablet, Rfl: 0     tretinoin (RETIN-A) 0.1 % cream, Apply QHS to face for rosacea, Disp: , Rfl:      VITAMIN D PO, Take 4,000 Units by mouth daily, Disp: , Rfl:      predniSONE (DELTASONE) 10 MG tablet, Take 4 tablets daily for 3 days, 3 tablets daily for 3 days, 2 tablets daily for 3 days, and 1 tablet daily for 3 days. (Patient not taking: Reported on 1/27/2025), Disp: 30 tablet, Rfl: 0  Allergies   Allergen Reactions     Cheese      Fentanyl Other (See Comments)     May have caused pt. To pass out after cataract      Methohexital Other (See Comments)     May have caused pt. To pass out after cataract      Midazolam Other (See Comments)     May have caused pt. To pass out after cataract surgery     Seasonal Allergies          EXAM:   /77 (BP Location: Left arm, Patient Position: Sitting, Cuff Size: Adult Regular)   Pulse (!) 111   Wt 78.3 kg (172 lb 11.2 oz)   SpO2 98%   BMI 27.25 kg/m      Constitutional:       General: She is not in acute distress.     Appearance: Normal appearance. She  is not ill-appearing.   HENT:      Head: Normocephalic and atraumatic.      Nose: Nose normal. No congestion or rhinorrhea.      Mouth/Throat:      Mouth: Mucous membranes are moist.      Pharynx: Oropharynx is clear. No posterior oropharyngeal erythema.   Eyes:      General:         Right eye: No discharge.         Left eye: No discharge.   Pulmonary:      Effort: Pulmonary effort is normal.      Breath sounds: Normal breath sounds. No wheezing or rhonchi.   Skin:     General: Skin is warm.      Findings: No erythema or rash.   Neurological:      General: No focal deficit present.      Mental Status: She is alert. Mental status is at baseline.   Psychiatric:         Mood and Affect: Mood normal.         Behavior: Behavior normal.      ASSESSMENT/PLAN:  Yanely Jon is a 74 year old female seen today for persistent postnasal drainage and mucus for 20 to 30 years.  Sinus CT scan obtained does show left maxillary sinusitis.  This may be contributing to her persistent symptoms.  Will switch antibiotics and extend the steroids.  She does have an upcoming ENT appointment.  She will contact the allergy clinic in 2 weeks and let us know how she responded to treatment.    See ENT as planned in March.  Started Augmentin and prednisone and contact the clinic in 2 weeks with an update.  Continue with Zyrtec-D and Flonase since it does provide some relief.  Continue omeprazole as needed    Follow-up as needed      Thank you for allowing me to participate in the care of Yanely Jon.      I spent 30 minutes on the date of the encounter doing chart review, history and exam, documentation and further coordination as noted above exclusive of separately reported interpretations    Jairo Ya MD  Allergy/Immunology  [facility]      Again, thank you for allowing me to participate in the care of your patient.        Sincerely,        Jairo Ya MD    Electronically signed

## 2025-01-27 NOTE — PROGRESS NOTES
Yanely Jon was seen in the Allergy Clinic at [facility].    Yanely Jon is a 74 year old female being seen today for ongoing evaluation of allergic rhinitis to trees with recently identified left-sided maxillary sinusitis.    She continues to take Zyrtec-D morning and evening and occasionally additional Zyrtec with Flonase.  The Flonase is also daily.  Skin testing at the last appointment was positive to 1 tree [place].    Sinus CT scan was obtained which did show significant left maxillary sinusitis.  She was treated with doxycycline and prednisone and does not recall any significant benefit.  She was referred to ENT with appointment in March.    CT SCAN FINDINGS:   Maxillary sinuses: Clear right maxillary sinus. Subtotal opacification  of the left maxillary sinus with inspissated mucus and scattered  calcifications. There is contraction of the left maxillary sinus with  osseous thickening of the maxillary buttresses. No definite remodeling  of the maxillary sinus ostium.     The imaged skull base, intracranial and orbital structures are within  normal limits                                                                      IMPRESSION:   Chronic left maxillary sinusitis. The remainder of the paranasal  sinuses are unremarkable.    PAST ALLERGY HISTORY:    She has had at least 30 years of symptoms and has been taking allergy medications that entire time.  This includes Zyrtec, Flonase and Sudafed.  Zyrtec and Flonase is daily all year-round.  Sudafed is most days.      She also has significant postnasal drainage and has actually been vomiting every other day which she feels is related to the significant postnasal drainage over the last 7 days.  She has had some nausea.  She did not have these symptoms while taking Zyrtec.    With medications she only is getting 50% improvement.  She has persistent nasal congestion, facial pressure, sneezing, postnasal drainage, headaches  as well as eye itching.  She does not have any pets.  Symptoms are getting worse and are year-round.      She does have mild reflux symptoms and takes omeprazole as needed    Past Medical History:   Diagnosis Date    Acrochordon     Atypical chest pain 1999    Normal stress test in 1999, performed for atypical chest pain    Benign nevus of skin     Numerous benign skin nevi    Biceps tendonitis     Bilateral bunions     Breast implant leak     Breast implants complicated by a leak. She then underwent replacement and repair.     Chronic fatigue     H/O urinary incontinence     Mild occasional female urinary incontinence which seems to be both urge and stress related.    History of bilateral cataract extraction     Hx of tubal ligation     Left hip pain     Occasional left hip pain due to osteoarthritis and/or trochanteric bursitis.    Lipoma of back 2014    Chronic lipoma of the left back, status post-surgical resection in January 2014    Low back pain     Occasional low back pain with radicular symptoms into the legs.    Osteoarthritis of knees, bilateral     Osteoarthritis of left hip     Osteopenia 09/13/2013    Postmenopausal status     Prediabetes 02/2020    Pubic bone pain     Musculoskeletal pubic bone pain due to a strain.    Rosacea     Seborrheic keratoses     Skin lesions     Atypical melanocytic lesions, status post multiple biopsies by Dr. Rivera in July of 2015. These biposies showed a junctional nevus with moderate atypia, which is also a dysplastic nevus and the final one was also a compound nevus, with mild atypia which is also a dysplastic nevus.  She was contacted by Dr. Rivera and was told to follow up with her again in July 2016, for re-evaluation of her skin.    Skin tag     Solar elastosis     Solar lentiginosis     Sternal fracture 2015    Mid-sternal fracture after blunt trauma while falling while running up stepsin May 2015. She may have also had some rib fractures as well. These healed with  conservative management.    Symptomatic varicose veins of both lower extremities 2015    Lower extremity venous varicosities, intermittently symptomatic, status post evaluation with Dr. Alonso in General Surgery in the summer of 2015.    Symptomatic varicose veins of both lower extremities     Status post injection and compression sclerotherapy    Syncope 2018    Urticaria     Questionable exercise-induced urticaria     Family History   Problem Relation Age of Onset    Osteoarthritis Mother     Cerebrovascular Disease Mother 95    Osteoporosis Mother     Hip fracture Mother     Bladder Cancer Father 67    Breast Cancer Sister 53    Other - See Comments Sister         colon resetion, cause unclear    Heart Disease Brother 50        stents, was smoker    Hyperlipidemia Maternal Aunt     Cerebrovascular Disease Maternal Aunt     Hyperlipidemia Maternal Aunt     Thyroid Disease No family hx of     Thyroid Cancer No family hx of      Past Surgical History:   Procedure Laterality Date    APPENDECTOMY      BUNIONECTOMY Bilateral 10/2017    CATARACT IOL, RT/LT Right     COLONOSCOPY  2014    repeat in 10 yr    COLONOSCOPY N/A 10/3/2024    Procedure: Colonoscopy;  Surgeon: Angely Forman MD;  Location: [facility] OR    DENTAL SURGERY      MAMMOPLASTY AUGMENTATION Bilateral 1972    RHYTIDECTOMY (FACELIFT)  01/23/2014    neck    SCLEROTHERAPY  10/03/2014    varicose veins    SURGICAL PATHOLOGY EXAM  01/13/2014    lower back    TONSILLECTOMY      TOTAL KNEE ARTHROPLASTY Right 12/2021    TUBAL LIGATION           Current Outpatient Medications:     alendronate (FOSAMAX) 70 MG tablet, Take 1 tablet (70 mg) by mouth every 7 days, Disp: 12 tablet, Rfl: 3    amoxicillin-clavulanate (AUGMENTIN) 875-125 MG tablet, Take 1 tablet by mouth 2 times daily for 10 days., Disp: 20 tablet, Rfl: 0    atorvastatin (LIPITOR) 10 MG tablet, Take 1 tablet (10 mg) by mouth daily, Disp: 90 tablet, Rfl: 3    calcium carbonate 600 mg-vitamin D 400 units  (CALTRATE) 600-400 MG-UNIT per tablet, Take 1 tablet by mouth 2 times daily, Disp: 180 tablet, Rfl: 3    celecoxib (CELEBREX) 200 MG capsule, Take 1 capsule (200 mg) by mouth daily, Disp: 90 capsule, Rfl: 3    cetirizine (ZYRTEC) 10 MG tablet, Take 1 tablet (10 mg) by mouth daily, Disp: 90 tablet, Rfl: 3    DULoxetine (CYMBALTA) 30 MG capsule, , Disp: , Rfl:     metroNIDAZOLE (METROGEL) 1 % gel, , Disp: , Rfl:     Omega-3 Fatty Acids (FISH OIL PO), , Disp: , Rfl:     predniSONE (DELTASONE) 10 MG tablet, Take 3 tabs daily x 5 days, 2 tabs daily x 5 days, then 1 tab daily x 5 days, Disp: 30 tablet, Rfl: 0    tretinoin (RETIN-A) 0.1 % cream, Apply QHS to face for rosacea, Disp: , Rfl:     VITAMIN D PO, Take 4,000 Units by mouth daily, Disp: , Rfl:     predniSONE (DELTASONE) 10 MG tablet, Take 4 tablets daily for 3 days, 3 tablets daily for 3 days, 2 tablets daily for 3 days, and 1 tablet daily for 3 days. (Patient not taking: Reported on 1/27/2025), Disp: 30 tablet, Rfl: 0  Allergies   Allergen Reactions    Cheese     Fentanyl Other (See Comments)     May have caused pt. To pass out after cataract     Methohexital Other (See Comments)     May have caused pt. To pass out after cataract     Midazolam Other (See Comments)     May have caused pt. To pass out after cataract surgery    Seasonal Allergies          EXAM:   /77 (BP Location: Left arm, Patient Position: Sitting, Cuff Size: Adult Regular)   Pulse (!) 111   Wt 78.3 kg (172 lb 11.2 oz)   SpO2 98%   BMI 27.25 kg/m      Constitutional:       General: She is not in acute distress.     Appearance: Normal appearance. She is not ill-appearing.   HENT:      Head: Normocephalic and atraumatic.      Nose: Nose normal. No congestion or rhinorrhea.      Mouth/Throat:      Mouth: Mucous membranes are moist.      Pharynx: Oropharynx is clear. No posterior oropharyngeal erythema.   Eyes:      General:         Right eye: No discharge.         Left eye: No discharge.    Pulmonary:      Effort: Pulmonary effort is normal.      Breath sounds: Normal breath sounds. No wheezing or rhonchi.   Skin:     General: Skin is warm.      Findings: No erythema or rash.   Neurological:      General: No focal deficit present.      Mental Status: She is alert. Mental status is at baseline.   Psychiatric:         Mood and Affect: Mood normal.         Behavior: Behavior normal.      ASSESSMENT/PLAN:  Yanely Jon is a 74 year old female seen today for persistent postnasal drainage and mucus for 20 to 30 years.  Sinus CT scan obtained does show left maxillary sinusitis.  This may be contributing to her persistent symptoms.  Will switch antibiotics and extend the steroids.  She does have an upcoming ENT appointment.  She will contact the allergy clinic in 2 weeks and let us know how she responded to treatment.    See ENT as planned in March.  Started Augmentin and prednisone and contact the clinic in 2 weeks with an update.  Continue with Zyrtec-D and Flonase since it does provide some relief.  Continue omeprazole as needed    Follow-up as needed      Thank you for allowing me to participate in the care of Yanely Jon.      I spent 30 minutes on the date of the encounter doing chart review, history and exam, documentation and further coordination as noted above exclusive of separately reported interpretations    Jairo Ya MD  Allergy/Immunology  [facility]

## 2025-01-29 LAB
SCANNED LAB RESULT: NORMAL
SCANNED LAB RESULT: NORMAL

## 2025-03-12 DIAGNOSIS — M85.88 OSTEOPENIA OF LUMBAR SPINE: ICD-10-CM

## 2025-03-13 RX ORDER — ALENDRONATE SODIUM 70 MG/1
70 TABLET ORAL
Qty: 12 TABLET | Refills: 0 | Status: SHIPPED | OUTPATIENT
Start: 2025-03-13

## 2025-03-13 NOTE — TELEPHONE ENCOUNTER
Medication requested: alendronate (FOSAMAX) 70 MG tablet   Last office visit: 4/26/24  [facility] appointments: 4/29/25  Medication last refilled: 4/26/24; 12 + 3 refills  Last qualifying labs: N/A    Last DEXA 6/14/24    Prescription approved per [facility] Refill Protocol.    Kumar JOSEPH, RN  03/13/25 9:14 AM

## 2025-03-24 ENCOUNTER — PRE VISIT (OUTPATIENT)
Dept: OTOLARYNGOLOGY | Facility: CLINIC | Age: 75
End: 2025-03-24

## 2025-03-24 ENCOUNTER — PREP FOR PROCEDURE (OUTPATIENT)
Dept: OTOLARYNGOLOGY | Facility: CLINIC | Age: 75
End: 2025-03-24

## 2025-03-24 ENCOUNTER — OFFICE VISIT (OUTPATIENT)
Dept: OTOLARYNGOLOGY | Facility: CLINIC | Age: 75
End: 2025-03-24
Payer: MEDICARE

## 2025-03-24 VITALS
BODY MASS INDEX: 27.61 KG/M2 | DIASTOLIC BLOOD PRESSURE: 87 MMHG | HEIGHT: 67 IN | SYSTOLIC BLOOD PRESSURE: 151 MMHG | HEART RATE: 99 BPM | OXYGEN SATURATION: 97 % | WEIGHT: 175.9 LBS

## 2025-03-24 DIAGNOSIS — J32.0 LEFT MAXILLARY SINUSITIS: Primary | ICD-10-CM

## 2025-03-24 DIAGNOSIS — J32.0 CHRONIC MAXILLARY SINUSITIS: ICD-10-CM

## 2025-03-24 PROCEDURE — 31231 NASAL ENDOSCOPY DX: CPT | Mod: 52 | Performed by: OTOLARYNGOLOGY

## 2025-03-24 PROCEDURE — 3077F SYST BP >= 140 MM HG: CPT | Performed by: OTOLARYNGOLOGY

## 2025-03-24 PROCEDURE — 99202 OFFICE O/P NEW SF 15 MIN: CPT | Mod: 25 | Performed by: OTOLARYNGOLOGY

## 2025-03-24 PROCEDURE — 3079F DIAST BP 80-89 MM HG: CPT | Performed by: OTOLARYNGOLOGY

## 2025-03-24 PROCEDURE — 1126F AMNT PAIN NOTED NONE PRSNT: CPT | Performed by: OTOLARYNGOLOGY

## 2025-03-24 RX ORDER — DEXAMETHASONE SODIUM PHOSPHATE 4 MG/ML
10 INJECTION, SOLUTION INTRA-ARTICULAR; INTRALESIONAL; INTRAMUSCULAR; INTRAVENOUS; SOFT TISSUE ONCE
OUTPATIENT
Start: 2025-03-24 | End: 2025-03-24

## 2025-03-24 RX ORDER — OXYMETAZOLINE HYDROCHLORIDE 0.05 G/100ML
2 SPRAY NASAL ONCE
OUTPATIENT
Start: 2025-03-24 | End: 2025-03-24

## 2025-03-24 ASSESSMENT — PAIN SCALES - GENERAL: PAINLEVEL_OUTOF10: NO PAIN (0)

## 2025-03-24 NOTE — LETTER
3/24/2025       RE: Yanely Jon  [address]  [place] [facility] [address]     Dear Colleague,    Thank you for referring your patient, Yanely Jon, to the [facility] [place] at [facility]. Please see a copy of my visit note below.        [facility] [place]  [address]  4TH FLOOR  [facility] [address]  Phone: [phone]  Fax: [phone]    Patient:  Yanely Jon, Date of birth 1950  Date of Visit:  03/24/2025  Referring Provider Jairo Ya      Assessment & Plan     Chronic maxillary sinusitis:  - Chronic fungal infection on the left side  - Surgical intervention under general anesthesia: maxillary antrostomy with removal of tissue. Post-operative care includes saline rinsing. Schedule surgery after the history and physical with the primary doctor on April 24, 2025.  - Risks discussed.    20 minutes spent by me on the date of the encounter doing chart review, history and exam, documentation and further activities per the note. This time is in addition to separately billable procedure.           History of Present Illness    Pertinent history obtain from: chart review and patient    Ashtyn Jon, a 74-year-old female, reports having a chronic sinus condition throughout her life. She experiences constant post-nasal drip, which worsens when she discontinues Zyrtec D, leading to nausea. She has a history of a chronic sinus infection on the left side, as indicated by a previous CT scan ordered by her allergist, Doctor Ya. She also reports feeling black and blue under her left eye. There is no history of allergies, and she recalls having dental work on the left side, which may have contributed to her sinus issues.    Past Medical History:   Diagnosis Date     Acrochordon      Atypical chest pain 1999    Normal stress test in 1999,  performed for atypical chest pain     Benign nevus of skin     Numerous benign skin nevi     Biceps tendonitis      Bilateral bunions      Breast implant leak     Breast implants complicated by a leak. She then underwent replacement and repair.      Chronic fatigue      H/O urinary incontinence     Mild occasional female urinary incontinence which seems to be both urge and stress related.     History of bilateral cataract extraction      Hx of tubal ligation      Left hip pain     Occasional left hip pain due to osteoarthritis and/or trochanteric bursitis.     Lipoma of back 2014    Chronic lipoma of the left back, status post-surgical resection in January 2014     Low back pain     Occasional low back pain with radicular symptoms into the legs.     Osteoarthritis of knees, bilateral      Osteoarthritis of left hip      Osteopenia 09/13/2013     Postmenopausal status      Prediabetes 02/2020     Pubic bone pain     Musculoskeletal pubic bone pain due to a strain.     Rosacea      Seborrheic keratoses      Skin lesions     Atypical melanocytic lesions, status post multiple biopsies by Dr. Rivera in July of 2015. These biposies showed a junctional nevus with moderate atypia, which is also a dysplastic nevus and the final one was also a compound nevus, with mild atypia which is also a dysplastic nevus.  She was contacted by Dr. Rivera and was told to follow up with her again in July 2016, for re-evaluation of her skin.     Skin tag      Solar elastosis      Solar lentiginosis      Sternal fracture 2015    Mid-sternal fracture after blunt trauma while falling while running up stepsin May 2015. She may have also had some rib fractures as well. These healed with conservative management.     Symptomatic varicose veins of both lower extremities 2015    Lower extremity venous varicosities, intermittently symptomatic, status post evaluation with Dr. Alonso in General Surgery in the summer of 2015.     Symptomatic varicose veins  "of both lower extremities     Status post injection and compression sclerotherapy     Syncope 2018     Urticaria     Questionable exercise-induced urticaria     Current Outpatient Medications   Medication Sig Dispense Refill     alendronate (FOSAMAX) 70 MG tablet TAKE 1 TABLET BY MOUTH EVERY 7 DAYS 12 tablet 0     atorvastatin (LIPITOR) 10 MG tablet Take 1 tablet (10 mg) by mouth daily 90 tablet 3     calcium carbonate 600 mg-vitamin D 400 units (CALTRATE) 600-400 MG-UNIT per tablet Take 1 tablet by mouth 2 times daily 180 tablet 3     celecoxib (CELEBREX) 200 MG capsule Take 1 capsule (200 mg) by mouth daily 90 capsule 3     cetirizine (ZYRTEC) 10 MG tablet Take 1 tablet (10 mg) by mouth daily 90 tablet 3     DULoxetine (CYMBALTA) 30 MG capsule        metroNIDAZOLE (METROGEL) 1 % gel        Omega-3 Fatty Acids (FISH OIL PO)        tretinoin (RETIN-A) 0.1 % cream Apply QHS to face for rosacea       VITAMIN D PO Take 4,000 Units by mouth daily       predniSONE (DELTASONE) 10 MG tablet Take 3 tabs daily x 5 days, 2 tabs daily x 5 days, then 1 tab daily x 5 days 30 tablet 0     predniSONE (DELTASONE) 10 MG tablet Take 4 tablets daily for 3 days, 3 tablets daily for 3 days, 2 tablets daily for 3 days, and 1 tablet daily for 3 days. (Patient not taking: Reported on 3/24/2025) 30 tablet 0     No current facility-administered medications for this visit.           Physical Exam    Vital signs:  BP (!) 151/87   Pulse 99   Ht 1.695 m (5' 6.75\")   Wt 79.8 kg (175 lb 14.4 oz)   SpO2 97%   BMI 27.76 kg/m      - HEENT: normal facial exam, bandaid on nose for skin lesion removal.     Procedure:  Endoscopy indicated for exam of left sinuses.  Topical anesthetic/decongestant spray applied.  Rigid scope used for visualization.  Findings: findings include obstructed left sinus with absence of drainage, presence of viscous mucus, and scarred occlusion of the normal sinus ostium. Right side appears fine.             Consent was " obtained from the patient to use an AI documentation tool in the creation of  this note            Teaching Flowsheet - ENT   Relevant Diagnosis: Left Maxillary Sinusitis  Teaching Topic:Person(s) involved in teaching: patient       Motivation Level:  Asks Questions:   Yes  Eager to Learn:   Yes  Cooperative:   Yes  Receptive (willing/able to accept information):   Yes  Comments: Reviewed pre-op H and P,  NPO prior to  surgery,  pre-op scrub (given Hibiclens)  Reviewed post-op  cares , activity and pain.     Patient demonstrates understanding of the following:  Reason for the appointment, diagnosis and treatment plan:   Yes  Knowledge of proper use of medications and conditions for which they are ordered (with special attention to potential side effects or drug interactions):  stop aspirin products 1 week before surgery Yes  Which situations necessitate calling provider and whom to contact:   Yes  Nutritional needs and diet plan:   Yes  Pain management techniques:   Yes  Patient instructed on hand hygiene:  Yes  How and/when to access community resources:   Yes     Infection Prevention:  Patient   demonstrates understanding of the following:  Surgical procedure site care taught Yes  Signs and symptoms of infection taught Yes  Wound care taught Yes  Instructional Materials Used/Given: pre- op booklet,verbal instruction.    Chanelle Rubi RN      Again, thank you for allowing me to participate in the care of your patient.      Sincerely,    Regine Reyes MD

## 2025-03-24 NOTE — PROGRESS NOTES
[facility] [address] [address]  Phone: [phone]  Fax: [phone]    Patient:  Yanely Jon, Date of birth 1950  Date of Visit:  03/24/2025  Referring Provider Jairo Ya      Assessment & Plan      Chronic maxillary sinusitis:  - Chronic fungal infection on the left side  - Surgical intervention under general anesthesia: maxillary antrostomy with removal of tissue. Post-operative care includes saline rinsing. Schedule surgery after the history and physical with the primary doctor on April 24, 2025.  - Risks discussed.    20 minutes spent by me on the date of the encounter doing chart review, history and exam, documentation and further activities per the note. This time is in addition to separately billable procedure.           History of Present Illness     Pertinent history obtain from: chart review and patient    Ashtyn Jon, a 74-year-old female, reports having a chronic sinus condition throughout her life. She experiences constant post-nasal drip, which worsens when she discontinues Zyrtec D, leading to nausea. She has a history of a chronic sinus infection on the left side, as indicated by a previous CT scan ordered by her allergist, Doctor Ya. She also reports feeling black and blue under her left eye. There is no history of allergies, and she recalls having dental work on the left side, which may have contributed to her sinus issues.    Past Medical History:   Diagnosis Date    Acrochordon     Atypical chest pain 1999    Normal stress test in 1999, performed for atypical chest pain    Benign nevus of skin     Numerous benign skin nevi    Biceps tendonitis     Bilateral bunions     Breast implant leak     Breast implants complicated by a leak. She then underwent replacement and repair.     Chronic fatigue     H/O urinary incontinence     Mild occasional female urinary incontinence which  seems to be both urge and stress related.    History of bilateral cataract extraction     Hx of tubal ligation     Left hip pain     Occasional left hip pain due to osteoarthritis and/or trochanteric bursitis.    Lipoma of back 2014    Chronic lipoma of the left back, status post-surgical resection in January 2014    Low back pain     Occasional low back pain with radicular symptoms into the legs.    Osteoarthritis of knees, bilateral     Osteoarthritis of left hip     Osteopenia 09/13/2013    Postmenopausal status     Prediabetes 02/2020    Pubic bone pain     Musculoskeletal pubic bone pain due to a strain.    Rosacea     Seborrheic keratoses     Skin lesions     Atypical melanocytic lesions, status post multiple biopsies by Dr. Rivera in July of 2015. These biposies showed a junctional nevus with moderate atypia, which is also a dysplastic nevus and the final one was also a compound nevus, with mild atypia which is also a dysplastic nevus.  She was contacted by Dr. Rivera and was told to follow up with her again in July 2016, for re-evaluation of her skin.    Skin tag     Solar elastosis     Solar lentiginosis     Sternal fracture 2015    Mid-sternal fracture after blunt trauma while falling while running up stepsin May 2015. She may have also had some rib fractures as well. These healed with conservative management.    Symptomatic varicose veins of both lower extremities 2015    Lower extremity venous varicosities, intermittently symptomatic, status post evaluation with Dr. Alonso in General Surgery in the summer of 2015.    Symptomatic varicose veins of both lower extremities     Status post injection and compression sclerotherapy    Syncope 2018    Urticaria     Questionable exercise-induced urticaria     Current Outpatient Medications   Medication Sig Dispense Refill    alendronate (FOSAMAX) 70 MG tablet TAKE 1 TABLET BY MOUTH EVERY 7 DAYS 12 tablet 0    atorvastatin (LIPITOR) 10 MG tablet Take 1 tablet (10 mg)  "by mouth daily 90 tablet 3    calcium carbonate 600 mg-vitamin D 400 units (CALTRATE) 600-400 MG-UNIT per tablet Take 1 tablet by mouth 2 times daily 180 tablet 3    celecoxib (CELEBREX) 200 MG capsule Take 1 capsule (200 mg) by mouth daily 90 capsule 3    cetirizine (ZYRTEC) 10 MG tablet Take 1 tablet (10 mg) by mouth daily 90 tablet 3    DULoxetine (CYMBALTA) 30 MG capsule       metroNIDAZOLE (METROGEL) 1 % gel       Omega-3 Fatty Acids (FISH OIL PO)       tretinoin (RETIN-A) 0.1 % cream Apply QHS to face for rosacea      VITAMIN D PO Take 4,000 Units by mouth daily      predniSONE (DELTASONE) 10 MG tablet Take 3 tabs daily x 5 days, 2 tabs daily x 5 days, then 1 tab daily x 5 days 30 tablet 0    predniSONE (DELTASONE) 10 MG tablet Take 4 tablets daily for 3 days, 3 tablets daily for 3 days, 2 tablets daily for 3 days, and 1 tablet daily for 3 days. (Patient not taking: Reported on 3/24/2025) 30 tablet 0     No current facility-administered medications for this visit.           Physical Exam     Vital signs:  BP (!) 151/87   Pulse 99   Ht 1.695 m (5' 6.75\")   Wt 79.8 kg (175 lb 14.4 oz)   SpO2 97%   BMI 27.76 kg/m      - HEENT: normal facial exam, bandaid on nose for skin lesion removal.     Procedure:  Endoscopy indicated for exam of left sinuses.  Topical anesthetic/decongestant spray applied.  Rigid scope used for visualization.  Findings: findings include obstructed left sinus with absence of drainage, presence of viscous mucus, and scarred occlusion of the normal sinus ostium. Right side appears fine.             Consent was obtained from the patient to use an AI documentation tool in the creation of  this note          "

## 2025-03-24 NOTE — PROGRESS NOTES
Teaching Flowsheet - ENT   Relevant Diagnosis: Left Maxillary Sinusitis  Teaching Topic:Person(s) involved in teaching: patient       Motivation Level:  Asks Questions:   Yes  Eager to Learn:   Yes  Cooperative:   Yes  Receptive (willing/able to accept information):   Yes  Comments: Reviewed pre-op H and P,  NPO prior to  surgery,  pre-op scrub (given Hibiclens)  Reviewed post-op  cares , activity and pain.     Patient demonstrates understanding of the following:  Reason for the appointment, diagnosis and treatment plan:   Yes  Knowledge of proper use of medications and conditions for which they are ordered (with special attention to potential side effects or drug interactions):  stop aspirin products 1 week before surgery Yes  Which situations necessitate calling provider and whom to contact:   Yes  Nutritional needs and diet plan:   Yes  Pain management techniques:   Yes  Patient instructed on hand hygiene:  Yes  How and/when to access community resources:   Yes     Infection Prevention:  Patient   demonstrates understanding of the following:  Surgical procedure site care taught Yes  Signs and symptoms of infection taught Yes  Wound care taught Yes  Instructional Materials Used/Given: pre- op booklet,verbal instruction.    Chanelle Rubi RN

## 2025-04-01 ENCOUNTER — TELEPHONE (OUTPATIENT)
Dept: OTOLARYNGOLOGY | Facility: CLINIC | Age: 75
End: 2025-04-01
Payer: MEDICARE

## 2025-04-01 NOTE — TELEPHONE ENCOUNTER
Left patient a voicemail to schedule surgery for Left Maxillary Antrostomy (Left) with Dr. Reyes - Left Surgery Scheduling line for callback [phone]    Maribel Hernandez on 4/1/2025 at 3:17 PM

## 2025-04-08 PROBLEM — J32.0 LEFT MAXILLARY SINUSITIS: Status: ACTIVE | Noted: 2025-03-24

## 2025-04-10 DIAGNOSIS — M15.8 OTHER OSTEOARTHRITIS INVOLVING MULTIPLE JOINTS: ICD-10-CM

## 2025-04-10 RX ORDER — CELECOXIB 200 MG/1
200 CAPSULE ORAL DAILY
Qty: 90 CAPSULE | Refills: 0 | Status: SHIPPED | OUTPATIENT
Start: 2025-04-10

## 2025-04-10 NOTE — TELEPHONE ENCOUNTER
Medication requested: celecoxib (CELEBREX) 200 MG capsule   Last office visit: 4/26/24  [facility] appointments: 4/29/25  Medication last refilled: 4/26/24; 90 + 3 refills  Last qualifying labs:   BP Readings from Last 3 Encounters:   03/24/25 (!) 151/87   01/27/25 131/77   12/17/24 139/84     Component      Latest Ref Rng 4/26/2024  9:27 AM   GFR Estimate      >60 mL/min/1.73m2 >90      Routing refill request to provider for review/approval because:  Labs not current:  CBC  Pt age > 62 years old    Kumar JOSEPH, RN  04/10/25 12:57 PM

## 2025-04-24 SDOH — HEALTH STABILITY: PHYSICAL HEALTH: ON AVERAGE, HOW MANY MINUTES DO YOU ENGAGE IN EXERCISE AT THIS LEVEL?: 60 MIN

## 2025-04-24 SDOH — HEALTH STABILITY: PHYSICAL HEALTH: ON AVERAGE, HOW MANY DAYS PER WEEK DO YOU ENGAGE IN MODERATE TO STRENUOUS EXERCISE (LIKE A BRISK WALK)?: 4 DAYS

## 2025-04-24 ASSESSMENT — SOCIAL DETERMINANTS OF HEALTH (SDOH): HOW OFTEN DO YOU GET TOGETHER WITH FRIENDS OR RELATIVES?: MORE THAN THREE TIMES A WEEK

## 2025-04-28 NOTE — PATIENT INSTRUCTIONS
Patient Education   Preventive Care Advice   This is general advice given by our system to help you stay healthy. However, your care team may have specific advice just for you. Please talk to your care team about your preventive care needs.  Nutrition  Eat 5 or more servings of fruits and vegetables each day.  Try wheat bread, brown rice and whole grain pasta (instead of white bread, rice, and pasta).  Get enough calcium and vitamin D. Check the label on foods and aim for 100% of the RDA (recommended daily allowance).  Lifestyle  Exercise at least 150 minutes each week  (30 minutes a day, 5 days a week).  Do muscle strengthening activities 2 days a week. These help control your weight and prevent disease.  No smoking.  Wear sunscreen to prevent skin cancer.  Have a dental exam and cleaning every 6 months.  Yearly exams  See your health care team every year to talk about:  Any changes in your health.  Any medicines your care team has prescribed.  Preventive care, family planning, and ways to prevent chronic diseases.  Shots (vaccines)   HPV shots (up to age 26), if you've never had them before.  Hepatitis B shots (up to age 59), if you've never had them before.  COVID-19 shot: Get this shot when it's due.  Flu shot: Get a flu shot every year.  Tetanus shot: Get a tetanus shot every 10 years.  Pneumococcal, hepatitis A, and RSV shots: Ask your care team if you need these based on your risk.  Shingles shot (for age 50 and up)  General health tests  Diabetes screening:  Starting at age 35, Get screened for diabetes at least every 3 years.  If you are younger than age 35, ask your care team if you should be screened for diabetes.  Cholesterol test: At age 39, start having a cholesterol test every 5 years, or more often if advised.  Bone density scan (DEXA): At age 50, ask your care team if you should have this scan for osteoporosis (brittle bones).  Hepatitis C: Get tested at least once in your life.  STIs (sexually  transmitted infections)  Before age 24: Ask your care team if you should be screened for STIs.  After age 24: Get screened for STIs if you're at risk. You are at risk for STIs (including HIV) if:  You are sexually active with more than one person.  You don't use condoms every time.  You or a partner was diagnosed with a sexually transmitted infection.  If you are at risk for HIV, ask about PrEP medicine to prevent HIV.  Get tested for HIV at least once in your life, whether you are at risk for HIV or not.  Cancer screening tests  Cervical cancer screening: If you have a cervix, begin getting regular cervical cancer screening tests starting at age 21.  Breast cancer scan (mammogram): If you've ever had breasts, begin having regular mammograms starting at age 40. This is a scan to check for breast cancer.  Colon cancer screening: It is important to start screening for colon cancer at age 45.  Have a colonoscopy test every 10 years (or more often if you're at risk) Or, ask your provider about stool tests like a FIT test every year or Cologuard test every 3 years.  To learn more about your testing options, visit:   .  For help making a decision, visit:   https://bit.ly/vv27151.  Prostate cancer screening test: If you have a prostate, ask your care team if a prostate cancer screening test (PSA) at age 55 is right for you.  Lung cancer screening: If you are a current or former smoker ages 50 to 80, ask your care team if ongoing lung cancer screenings are right for you.  For informational purposes only. Not to replace the advice of your health care provider. Copyright   2023 [facility] [employer]. All rights reserved. Clinically reviewed by the [facility]. [employer] [id number] - REV 01/24.  Hearing Loss: Care Instructions  Overview     Hearing loss is a sudden or slow decrease in how well you hear. It can range from slight to profound. Permanent hearing loss can occur with aging. It also can  happen when you are exposed long-term to loud noise. Examples include listening to loud music, riding motorcycles, or being around other loud machines.  Hearing loss can affect your work and home life. It can make you feel lonely or depressed. You may feel that you have lost your independence. But hearing aids and other devices can help you hear better and feel connected to others.  Follow-up care is a key part of your treatment and safety. Be sure to make and go to all appointments, and call your doctor if you are having problems. It's also a good idea to know your test results and keep a list of the medicines you take.  How can you care for yourself at home?  Avoid loud noises whenever possible. This helps keep your hearing from getting worse.  Always wear hearing protection around loud noises.  Wear a hearing aid as directed.  A professional can help you pick a hearing aid that will work best for you.  You can also get hearing aids over the counter for mild to moderate hearing loss.  Have hearing tests as your doctor suggests. They can show whether your hearing has changed. Your hearing aid may need to be adjusted.  Use other devices as needed. These may include:  Telephone amplifiers and hearing aids that can connect to a television, stereo, radio, or microphone.  Devices that use lights or vibrations. These alert you to the doorbell, a ringing telephone, or a baby monitor.  Television closed-captioning. This shows the words at the bottom of the screen. Most new TVs can do this.  TTY (text telephone). This lets you type messages back and forth on the telephone instead of talking or listening. These devices are also called TDD. When messages are typed on the keyboard, they are sent over the phone line to a receiving TTY. The message is shown on a monitor.  Use text messaging, social media, and email if it is hard for you to communicate by telephone.  Try to learn a listening technique called speechreading. It is  "not lipreading. You pay attention to people's gestures, expressions, posture, and tone of voice. These clues can help you understand what a person is saying. Face the person you are talking to, and have them face you. Make sure the lighting is good. You need to see the other person's face clearly.  Think about counseling if you need help to adjust to your hearing loss.  When should you call for help?  Watch closely for changes in your health, and be sure to contact your doctor if:    You think your hearing is getting worse.     You have new symptoms, such as dizziness or nausea.   Where can you learn more?  Go to https://www.[email or web address].net/patiented  Enter R798 in the search box to learn more about \"Hearing Loss: Care Instructions.\"  Current as of: October 27, 2024  Content Version: 14.4    6477-3243 [employer].   Care instructions adapted under license by your healthcare professional. If you have questions about a medical condition or this instruction, always ask your healthcare professional. [employer] disclaims any warranty or liability for your use of this information.    Bladder Training: Care Instructions  Your Care Instructions     Bladder training is used to treat urge incontinence and stress incontinence. Urge incontinence means that the need to urinate comes on so fast that you can't get to a toilet in time. Stress incontinence means that you leak urine because of pressure on your bladder. For example, it may happen when you laugh, cough, or lift something heavy.  Bladder training can increase how long you can wait before you have to urinate. It can also help your bladder hold more urine. And it can give you better control over the urge to urinate.  It is important to remember that bladder training takes a few weeks to a few months to make a difference. You may not see results right away, but don't give up.  Follow-up care is a key part of your treatment and safety. Be sure to make " and go to all appointments, and call your doctor if you are having problems. It's also a good idea to know your test results and keep a list of the medicines you take.  How can you care for yourself at home?  Work with your doctor to come up with a bladder training program that is right for you. You may use one or more of the following methods.  Delayed urination  In the beginning, try to keep from urinating for 5 minutes after you first feel the need to go.  While you wait, take deep, slow breaths to relax. Kegel exercises can also help you delay the need to go to the bathroom.  After some practice, when you can easily wait 5 minutes to urinate, try to wait 10 minutes before you urinate.  Slowly increase the waiting period until you are able to control when you have to urinate.  Scheduled urination  Empty your bladder when you first wake up in the morning.  Schedule times throughout the day when you will urinate.  Start by going to the bathroom every hour, even if you don't need to go.  Slowly increase the time between trips to the bathroom.  When you have found a schedule that works well for you, keep doing it.  If you wake up during the night and have to urinate, do it. Apply your schedule to waking hours only.  Kegel exercises  These tighten and strengthen pelvic muscles, which can help you control the flow of urine. (If doing these exercises causes pain, stop doing them and talk with your doctor.) To do Kegel exercises:  Squeeze your muscles as if you were trying not to pass gas. Or squeeze your muscles as if you were stopping the flow of urine. Your belly, legs, and buttocks shouldn't move.  Hold the squeeze for 3 seconds, then relax for 5 to 10 seconds.  Start with 3 seconds, then add 1 second each week until you are able to squeeze for 10 seconds.  Repeat the exercise 10 times a session. Do 3 to 8 sessions a day.  When should you call for help?  Watch closely for changes in your health, and be sure to  "contact your doctor if:    Your incontinence is getting worse.     You do not get better as expected.   Where can you learn more?  Go to https://www.[email or web address]/patiented  Enter V684 in the search box to learn more about \"Bladder Training: Care Instructions.\"  Current as of: April 30, 2024  Content Version: 14.4    [id number] [employer].   Care instructions adapted under license by your healthcare professional. If you have questions about a medical condition or this instruction, always ask your healthcare professional. [employer] disclaims any warranty or liability for your use of this information.       "

## 2025-04-28 NOTE — PROGRESS NOTES
Yanely Jon is a 74 year old female with hx of osteopenia, thyroid nodule, seasonal allergies,osteoarthritis, hyperlipidemia, rosacea, elevated BP. She is here for a general check up.  She is  fasting. She is up to date on eye exams and dental visits.    [facility]  Advanced directive: on file, dated 2011, review documnet  COVID vaccine Eligible for booster--will do today  Other vaccines  --up to date  Colon cancer screening completed 10/2024 normal --no follow up indicated  Mammogram --bilateral implants, screening due June 2025  DEXA due 6/2026--on alendronate may stop now at anniversary    Medicare questions:  Hearing concerns: hearing aids  Fall Risk: no  Independent at home: yes  Safe : yes  Memory concerns: yes     COGNITIVE SCREEN  1) Repeat 3 items (Banana, Sunrise, Chair)    2) Clock draw: NORMAL  3) 3 item recall: Recalls 3 objects  Results: 3 items recalled: COGNITIVE IMPAIRMENT LESS LIKELY     Mini-CogTM Copyright [employer]. Licensed by the author for use in [employer]; reprinted with permission ([email or web address]@[facility]). All rights reserved.        Diet: omnivore  Semaglutide x 2 month at [place]  0.54 mg weekly --stopping med, not helping  Wt Readings from Last 4 Encounters:   04/29/25 76.7 kg (169 lb)   03/24/25 79.8 kg (175 lb 14.4 oz)   01/27/25 78.3 kg (172 lb 11.2 oz)   12/17/24 76.2 kg (168 lb)     Body mass index is 27.45 kg/m .    Elevated blood pressure  Hx of elevated blood pressure  Has home BP monitor ---SBP in good range 120-130s, she brings in readings  BP in target range today  BP Readings from Last 3 Encounters:   04/29/25 127/86   03/24/25 (!) 151/87   01/27/25 131/77     Hyperlipidemia LDL goal <130  Atorvastatin 10mg daily  No chest pain, palpitations, or near syncope  Recent Labs   Lab Test 04/26/24  0927 04/24/23  1343 12/14/21  1525 12/14/20  1356 02/10/20  1431   CHOL 183 181   < > 193.0 174.0   HDL 73 68   < > 80.0 68.0   LDL 98 102*   < > 104.0 95.0   TRIG 58 55   < > 45.0  "57.0   CHOLHDLRATIO  --   --   --  2.4 2.6    < > = values in this interval not displayed.     Osteoarthritis  Left knee, foot (injection 1/2024) , due now for \"rooster comb\" injections  S/p L4-S1 RFA 9/2023, had LBP with radicular symptoms down leg  Taking duloxetine 30mg, 2 po q day (rx from [facility])  Celebrex 200mg daily , no GI complaints  Today  KIMMIE of lumbar spine at [facility], no radicular symptoms     Osteopenia of lumbar spine  On alendronate weekly dose, started 2/2020---now at 5 yr anniversary  Bone density improving by 6% in 2022,   Calcium --taking supplements  Exercise --regular walking  DEXA 6/2024--bone density continuing to improve 6.1% (+12% since 2020) at lumbar spine and left total hip by 5% (+7.2% since 2020)  May stop alendronate at this time and continue with screening q 2 yr      PMH, PSH, FH, medications, allergies and immunizations are updated this visit.      Social  , 3 children, 12 grandchildren, 1 great grandchild     HABITS:  Tob: never  ETOH: rare  Calcium: 1+ supplement 2--Ca 400 mg/D   Caffeine  0-1 per day  Exercise:  walking, pickleball     OB/GYN HISTORY:  LMP: postmenopausal  G 3   P 3   A 0  Self Breast exam:  Yes, no concerns      Current Outpatient Medications   Medication Sig Dispense Refill    alendronate (FOSAMAX) 70 MG tablet TAKE 1 TABLET BY MOUTH EVERY 7 DAYS 12 tablet 0    atorvastatin (LIPITOR) 10 MG tablet Take 1 tablet (10 mg) by mouth daily 90 tablet 3    calcium carbonate 600 mg-vitamin D 400 units (CALTRATE) 600-400 MG-UNIT per tablet Take 1 tablet by mouth 2 times daily 180 tablet 3    celecoxib (CELEBREX) 200 MG capsule TAKE ONE CAPSULE BY MOUTH ONCE DAILY 90 capsule 0    cetirizine (ZYRTEC) 10 MG tablet Take 1 tablet (10 mg) by mouth daily 90 tablet 3    DULoxetine (CYMBALTA) 30 MG capsule       metroNIDAZOLE (METROGEL) 1 % gel       Omega-3 Fatty Acids (FISH OIL PO)       tretinoin (RETIN-A) 0.1 % cream Apply QHS to face for rosacea      VITAMIN D PO Take 4,000 " "Units by mouth daily       Allergies   Allergen Reactions    Cheese     Fentanyl Other (See Comments)     May have caused pt. To pass out after cataract     Methohexital Other (See Comments)     May have caused pt. To pass out after cataract     Midazolam Other (See Comments)     May have caused pt. To pass out after cataract surgery    Seasonal Allergies          ROS  CONSTITUTIONAL:NEGATIVE for fever, chills, change in weight  INTEGUMENTARY/SKIN: NEGATIVE for worrisome rashes, moles or lesions  EYES: NEGATIVE for vision changes or irritation  ENT/MOUTH: NEGATIVE for ear, mouth and throat problems  RESP:NEGATIVE for significant cough or SOB  CV: NEGATIVE for chest pain, palpitations, CERVANTES, orthopnea, PND  or peripheral edema  GI: NEGATIVE for nausea, abdominal pain, heartburn, or change in bowel habits  :NEGATIVE for frequency, dysuria, or hematuria  MUSCULOSKELETAL:NEGATIVE for significant arthralgias or myalgia--see above back and foot pain  NEURO: NEGATIVE for weakness, dizziness or paresthesias  ENDOCRINE: NEGATIVE for polyuria/dipsia,  temperature intolerance, skin/hair changes  HEME/ALLERGY/IMMUNE: NEGATIVE for bleeding problems  PSYCHIATRIC: NEGATIVE for changes in mood or affect    EXAM  /86 (BP Location: Left arm, Patient Position: Sitting, Cuff Size: Adult Regular)   Pulse 105   Temp 97.3  F (36.3  C) (Skin)   Resp 14   Ht 1.671 m (5' 5.79\")   Wt 76.7 kg (169 lb)   SpO2 96%   BMI 27.45 kg/m        GENERAL APPEARANCE: Alert, pleasant, NAD  EYES: PERRL, EOMI, conjunctiva clear  HENT: TM normal bilaterally. Nose and mouth without lesions  NECK: no adenopathy, thyroid normal to palpation  RESP: lungs clear to auscultation bilaterally,  BREAST: bilateral saline breast implants, no compromise, no overlying skin changes.  no palpable  axillary masses or adenopathy  CV: regular rate and rhythm, normal S1 S2, no murmur, no carotid bruits  ABDOMEN: soft, nontender, without HSM or masses. Bowel sounds " normal  MS: Mild tenderness with palpation over the left SI joint and gluteal area.  SKIN: no suspicious lesions or rashes  NEURO: Normal strength and tone, sensory exam grossly normal, DTR normoreflexive in upper and lower extremities  PSYCH: mentation appears normal. and affect normal/bright.  EXT: no peripheral edema, pedal pulses palpable    Assessment:    (Z00.00) Encounter for Medicare annual wellness exam  (primary encounter diagnosis)  Comment: 74 year old woman in good health  Plan: Today we discussed ways to maintain a healthy lifestyle with sensible eating, regular exercise and self cares. We dicussed calcium and Vitamin D intake, vaccinations and preventive health screens.        (Z12.31) Visit for screening mammogram  Comment: Bilateral saline implants which are intact.  Otherwise normal clinical exam  Plan: MA Screen Bilateral w/Evaristo        Referral given    (M85.88) Osteopenia of lumbar spine  Comment: She has just completed 5 years of alendronate.  Bone density has been improving on DEXA scans.  Plan: Finish out her current prescription then stop.  Recheck DEXA scan in 2 years.  Continue with calcium and vitamin D sup and exercise    (E78.5) Hyperlipidemia LDL goal <130  Comment: Currently on a atorvastatin 10 mg daily  Plan: atorvastatin (LIPITOR) 10 MG tablet, Lipid         panel, Comprehensive metabolic panel            (M15.8) Other osteoarthritis involving multiple joints  Comment: Back knee and foot pain.  Managed by [facility] orthopedics.  She is undergoing an epidural steroid injection this afternoon  Plan: celecoxib (CELEBREX) 200 MG capsule, DULoxetine        (CYMBALTA) 30 MG capsule        Updated chart with medication list    (Z23) Need for COVID-19 vaccine  Comment: Eligible for routine booster  Plan: COVID-19 12+ (MODERNA)        Given    (J32.0) Left maxillary sinusitis  Comment: Longstanding left-sided sinus pressure  Plan: CBC with platelets        She will be undergoing surgery in  the near future and has a preop    Sherlyn Almanzar MD  Internal Medicine/Pediatrics

## 2025-04-29 ENCOUNTER — OFFICE VISIT (OUTPATIENT)
Dept: FAMILY MEDICINE | Facility: CLINIC | Age: 75
End: 2025-04-29
Payer: MEDICARE

## 2025-04-29 VITALS
DIASTOLIC BLOOD PRESSURE: 86 MMHG | SYSTOLIC BLOOD PRESSURE: 127 MMHG | TEMPERATURE: 97.3 F | OXYGEN SATURATION: 96 % | BODY MASS INDEX: 27.16 KG/M2 | RESPIRATION RATE: 14 BRPM | HEIGHT: 66 IN | WEIGHT: 169 LBS | HEART RATE: 105 BPM

## 2025-04-29 DIAGNOSIS — Z00.00 ENCOUNTER FOR MEDICARE ANNUAL WELLNESS EXAM: Primary | ICD-10-CM

## 2025-04-29 DIAGNOSIS — Z12.31 VISIT FOR SCREENING MAMMOGRAM: ICD-10-CM

## 2025-04-29 DIAGNOSIS — M15.8 OTHER OSTEOARTHRITIS INVOLVING MULTIPLE JOINTS: ICD-10-CM

## 2025-04-29 DIAGNOSIS — Z23 NEED FOR COVID-19 VACCINE: ICD-10-CM

## 2025-04-29 DIAGNOSIS — E78.5 HYPERLIPIDEMIA LDL GOAL <130: ICD-10-CM

## 2025-04-29 DIAGNOSIS — M85.88 OSTEOPENIA OF LUMBAR SPINE: ICD-10-CM

## 2025-04-29 DIAGNOSIS — J32.0 LEFT MAXILLARY SINUSITIS: ICD-10-CM

## 2025-04-29 LAB
ALBUMIN SERPL BCG-MCNC: 4.6 G/DL (ref 3.5–5.2)
ALP SERPL-CCNC: 54 U/L (ref 40–150)
ALT SERPL W P-5'-P-CCNC: 22 U/L (ref 0–50)
ANION GAP SERPL CALCULATED.3IONS-SCNC: 12 MMOL/L (ref 7–15)
AST SERPL W P-5'-P-CCNC: 24 U/L (ref 0–45)
BILIRUB SERPL-MCNC: 0.4 MG/DL
BUN SERPL-MCNC: 18.6 MG/DL (ref 8–23)
CALCIUM SERPL-MCNC: 9.8 MG/DL (ref 8.8–10.4)
CHLORIDE SERPL-SCNC: 103 MMOL/L (ref 98–107)
CHOLEST SERPL-MCNC: 198 MG/DL
CREAT SERPL-MCNC: 0.66 MG/DL (ref 0.51–0.95)
EGFRCR SERPLBLD CKD-EPI 2021: 90 ML/MIN/1.73M2
ERYTHROCYTE [DISTWIDTH] IN BLOOD BY AUTOMATED COUNT: 13.2 % (ref 10–15)
FASTING STATUS PATIENT QL REPORTED: YES
FASTING STATUS PATIENT QL REPORTED: YES
GLUCOSE SERPL-MCNC: 100 MG/DL (ref 70–99)
HCO3 SERPL-SCNC: 26 MMOL/L (ref 22–29)
HCT VFR BLD AUTO: 43.6 % (ref 35–47)
HDLC SERPL-MCNC: 74 MG/DL
HGB BLD-MCNC: 14.5 G/DL (ref 11.7–15.7)
LDLC SERPL CALC-MCNC: 116 MG/DL
MCH RBC QN AUTO: 32.5 PG (ref 26.5–33)
MCHC RBC AUTO-ENTMCNC: 33.3 G/DL (ref 31.5–36.5)
MCV RBC AUTO: 98 FL (ref 78–100)
NONHDLC SERPL-MCNC: 124 MG/DL
PLATELET # BLD AUTO: 236 10E3/UL (ref 150–450)
POTASSIUM SERPL-SCNC: 4.5 MMOL/L (ref 3.4–5.3)
PROT SERPL-MCNC: 7.3 G/DL (ref 6.4–8.3)
RBC # BLD AUTO: 4.46 10E6/UL (ref 3.8–5.2)
SODIUM SERPL-SCNC: 141 MMOL/L (ref 135–145)
TRIGL SERPL-MCNC: 40 MG/DL
WBC # BLD AUTO: 4.6 10E3/UL (ref 4–11)

## 2025-04-29 PROCEDURE — 84450 TRANSFERASE (AST) (SGOT): CPT | Mod: ORL | Performed by: INTERNAL MEDICINE

## 2025-04-29 PROCEDURE — 82465 ASSAY BLD/SERUM CHOLESTEROL: CPT | Mod: ORL | Performed by: INTERNAL MEDICINE

## 2025-04-29 RX ORDER — DULOXETIN HYDROCHLORIDE 30 MG/1
CAPSULE, DELAYED RELEASE ORAL
COMMUNITY
Start: 2025-04-29

## 2025-04-29 RX ORDER — ATORVASTATIN CALCIUM 10 MG/1
10 TABLET, FILM COATED ORAL DAILY
Qty: 90 TABLET | Refills: 3 | Status: SHIPPED | OUTPATIENT
Start: 2025-04-29 | End: 2025-05-01

## 2025-04-29 RX ORDER — CELECOXIB 200 MG/1
200 CAPSULE ORAL DAILY
Qty: 90 CAPSULE | Refills: 3 | Status: SHIPPED | OUTPATIENT
Start: 2025-04-29

## 2025-04-29 ASSESSMENT — PAIN SCALES - GENERAL: PAINLEVEL_OUTOF10: MODERATE PAIN (4)

## 2025-04-29 NOTE — NURSING NOTE
"74 year old    Chief Complaint   Patient presents with    Physical        Blood pressure 127/86, pulse 105, temperature 97.3  F (36.3  C), temperature source Skin, resp. rate 14, height 1.671 m (5' 5.79\"), weight 76.7 kg (169 lb), SpO2 96%. Body mass index is 27.45 kg/m .    Patient Active Problem List   Diagnosis    Cervicalgia    Thyroid nodule    Primary osteoarthritis of knee    Seasonal allergic rhinitis    Rosacea    Varicose veins of both lower extremities with inflammation    Osteopenia of lumbar spine    Abnormal fasting glucose    Personal history of irradiation, presenting hazards to health    Cervical adenopathy    Elevated blood pressure reading    Other osteoarthritis involving multiple joints    Hyperlipidemia LDL goal <130    Muscle cramps    Left maxillary sinusitis          Wt Readings from Last 2 Encounters:   04/29/25 76.7 kg (169 lb)   03/24/25 79.8 kg (175 lb 14.4 oz)       BP Readings from Last 3 Encounters:   04/29/25 127/86   03/24/25 (!) 151/87   01/27/25 131/77             Current Outpatient Medications   Medication Sig Dispense Refill    alendronate (FOSAMAX) 70 MG tablet TAKE 1 TABLET BY MOUTH EVERY 7 DAYS 12 tablet 0    atorvastatin (LIPITOR) 10 MG tablet Take 1 tablet (10 mg) by mouth daily 90 tablet 3    calcium carbonate 600 mg-vitamin D 400 units (CALTRATE) 600-400 MG-UNIT per tablet Take 1 tablet by mouth 2 times daily 180 tablet 3    celecoxib (CELEBREX) 200 MG capsule TAKE ONE CAPSULE BY MOUTH ONCE DAILY 90 capsule 0    cetirizine (ZYRTEC) 10 MG tablet Take 1 tablet (10 mg) by mouth daily 90 tablet 3    DULoxetine (CYMBALTA) 30 MG capsule       metroNIDAZOLE (METROGEL) 1 % gel       Omega-3 Fatty Acids (FISH OIL PO)       SEMAGLUTIDE, 1 MG/DOSE, SC       tretinoin (RETIN-A) 0.1 % cream Apply QHS to face for rosacea      VITAMIN D PO Take 4,000 Units by mouth daily       No current facility-administered medications for this visit.          Social History     Tobacco Use    Smoking " "status: Never    Smokeless tobacco: Never   Vaping Use    Vaping status: Never Used   Substance Use Topics    Alcohol use: Yes     Comment: 0-1 per day    Drug use: No          Health Maintenance Due   Topic Date Due    COVID-19 Vaccine (10 - 2024-25 season) 03/13/2025    LIPID  04/26/2025        No results found for: \"PAP\"        April 29, 2025 8:23 AM        "

## 2025-04-30 ENCOUNTER — OFFICE VISIT (OUTPATIENT)
Dept: FAMILY MEDICINE | Facility: CLINIC | Age: 75
End: 2025-04-30
Payer: MEDICARE

## 2025-04-30 ENCOUNTER — PATIENT OUTREACH (OUTPATIENT)
Dept: CARE COORDINATION | Facility: CLINIC | Age: 75
End: 2025-04-30
Payer: MEDICARE

## 2025-04-30 VITALS
TEMPERATURE: 98.1 F | HEART RATE: 108 BPM | SYSTOLIC BLOOD PRESSURE: 124 MMHG | RESPIRATION RATE: 15 BRPM | HEIGHT: 66 IN | OXYGEN SATURATION: 95 % | DIASTOLIC BLOOD PRESSURE: 76 MMHG | BODY MASS INDEX: 27.16 KG/M2 | WEIGHT: 169 LBS

## 2025-04-30 DIAGNOSIS — Z01.818 PREOP GENERAL PHYSICAL EXAM: Primary | ICD-10-CM

## 2025-04-30 DIAGNOSIS — J32.0 CHRONIC MAXILLARY SINUSITIS: ICD-10-CM

## 2025-04-30 RX ORDER — CETIRIZINE HYDROCHLORIDE, PSEUDOEPHEDRINE HYDROCHLORIDE 5; 120 MG/1; MG/1
1 TABLET, FILM COATED, EXTENDED RELEASE ORAL 2 TIMES DAILY
COMMUNITY
Start: 2025-04-30

## 2025-04-30 NOTE — PROGRESS NOTES
Preoperative Evaluation  [facility] [address], [address]  [facility] [address]  Phone: [phone]  Fax: [phone]  Primary Provider: Sherlyn Almanzar MD  Pre-op Performing Provider: Sherlyn Almanzar MD  Apr 30, 2025 4/30/2025   Surgical Information   What procedure is being done? preop   Facility or Hospital where procedure/surgery will be performed: [facility] on [place]  Dr. Reyes   Who is doing the procedure / surgery? sinus   Date of surgery / procedure: friday May 2  2025   Time of surgery / procedure: 5:45 am   Where do you plan to recover after surgery? at home with family     Fax number for surgical facility: Note does not need to be faxed, will be available electronically in Epic.    Assessment & Plan     The proposed surgical procedure is considered LOW risk.  (Z01.818) Preop general physical exam  (primary encounter diagnosis)  (J32.0) Chronic maxillary sinusitis  Comment: chronic left sided maxillary sinusitis, does not improve with antibiotic/prednisone trials. Full opacification of left maxillary sinus. Elective antrostomy of left Maxillary sinus proposed  Plan: no contraindication to surgery, proceed as planned.      - No identified additional risk factors other than previously addressed    Recommendation  Approval given to proceed with proposed procedure, without further diagnostic evaluation.    Sherlyn Almanzar MD  Internal Medicine/Pediatrics      Franklyn Chamorro is a 74 year old, presenting for the following:  Pre-Op Exam        HPI: Yanely Jon is a 74 year old female with hx of osteopenia, thyroid nodule, seasonal allergies,osteoarthritis, hyperlipidemia, rosacea, who presents for preoperative assessment. She has hx of chronic left sided maxillary sinusitis, full opacification of the sinus despite multiple attempts to treat with antibiotics and steroids. She is to undergo left maxillary  antrostomy.           4/30/2025   Pre-Op Questionnaire   Have you ever had a heart attack or stroke? No   Have you ever had surgery on your heart or blood vessels, such as a stent placement, a coronary artery bypass, or surgery on an artery in your head, neck, heart, or legs? No   Do you have chest pain with activity? No   Do you have a history of heart failure? No   Do you currently have a cold, bronchitis or symptoms of other infection? (!) YES--mild cough, post nasal drip   Do you have a cough, shortness of breath, or wheezing? (!) YES --mild cough, no shortness of breath   Do you or anyone in your family have previous history of blood clots? No   Do you or does anyone in your family have a serious bleeding problem such as prolonged bleeding following surgeries or cuts? No   Have you ever had problems with anemia or been told to take iron pills? No   Have you had any abnormal blood loss such as black, tarry or bloody stools, or abnormal vaginal bleeding? No   Have you ever had a blood transfusion? No   Are you willing to have a blood transfusion if it is medically needed before, during, or after your surgery? Yes   Have you or any of your relatives ever had problems with anesthesia? No   Do you have sleep apnea, excessive snoring or daytime drowsiness? No   Do you have any artifical heart valves or other implanted medical devices like a pacemaker, defibrillator, or continuous glucose monitor? No   Do you have artificial joints? (!) YES--Total Right knee replacement, Pin in R foot   Are you allergic to latex? (!) YES     Advance Care Planning  Document on file is a Health Care Directive or POLST.    Preoperative Review of    reviewed - no record of controlled substances prescribed.        Patient Active Problem List    Diagnosis Date Noted    Left maxillary sinusitis 03/24/2025     Priority: Medium    Muscle cramps 05/03/2023     Priority: Medium    Elevated blood pressure reading 04/24/2023     Priority:  Medium    Other osteoarthritis involving multiple joints 04/24/2023     Priority: Medium    Hyperlipidemia LDL goal <130 04/24/2023     Priority: Medium    Cervical adenopathy 10/26/2020     Priority: Medium    Abnormal fasting glucose 02/26/2020     Priority: Medium    Personal history of irradiation, presenting hazards to health 02/26/2020     Priority: Medium    Osteopenia of lumbar spine 09/04/2018     Priority: Medium     DEXA 5/2022  Results   Lumbar spine L1-L2   T-score -2.0   Left Femur neck         T-score -1.5   Left Total hip               T-score -1.4  Right Femur neck       T-score -1.7  Right Total hip             T-score -1.3     DEXA 12/2019  Spine T -2.4  Left Femoral neck T -1.9  Start alendronate February 27, 2020 , continue x 5 yrs      Thyroid nodule 10/25/2017     Priority: Medium    Primary osteoarthritis of knee 10/25/2017     Priority: Medium     knees      Seasonal allergic rhinitis 10/25/2017     Priority: Medium    Rosacea 10/25/2017     Priority: Medium    Cervicalgia 08/19/2016     Priority: Medium     S/p cortisone injections      Varicose veins of both lower extremities with inflammation 01/01/2015     Priority: Medium     Lower extremity venous varicosities, intermittently symptomatic, status post evaluation with Dr. Alonso in General Surgery in the summer of 2015.        Past Medical History:   Diagnosis Date    Acrochordon     Atypical chest pain 1999    Normal stress test in 1999, performed for atypical chest pain    Benign nevus of skin     Numerous benign skin nevi    Biceps tendonitis     Bilateral bunions     Breast implant leak     Breast implants complicated by a leak. She then underwent replacement and repair.     Chronic fatigue     H/O urinary incontinence     Mild occasional female urinary incontinence which seems to be both urge and stress related.    History of bilateral cataract extraction     Hx of tubal ligation     Left hip pain     Occasional left hip pain due to  osteoarthritis and/or trochanteric bursitis.    Lipoma of back 2014    Chronic lipoma of the left back, status post-surgical resection in January 2014    Low back pain     Occasional low back pain with radicular symptoms into the legs.    Osteoarthritis of knees, bilateral     Osteoarthritis of left hip     Osteopenia 09/13/2013    Postmenopausal status     Prediabetes 02/2020    Pubic bone pain     Musculoskeletal pubic bone pain due to a strain.    Rosacea     Seborrheic keratoses     Skin lesions     Atypical melanocytic lesions, status post multiple biopsies by Dr. Rivera in July of 2015. These biposies showed a junctional nevus with moderate atypia, which is also a dysplastic nevus and the final one was also a compound nevus, with mild atypia which is also a dysplastic nevus.  She was contacted by Dr. Rivera and was told to follow up with her again in July 2016, for re-evaluation of her skin.    Skin tag     Solar elastosis     Solar lentiginosis     Sternal fracture 2015    Mid-sternal fracture after blunt trauma while falling while running up stepsin May 2015. She may have also had some rib fractures as well. These healed with conservative management.    Symptomatic varicose veins of both lower extremities 2015    Lower extremity venous varicosities, intermittently symptomatic, status post evaluation with Dr. Alonso in General Surgery in the summer of 2015.    Symptomatic varicose veins of both lower extremities     Status post injection and compression sclerotherapy    Syncope 2018    Urticaria     Questionable exercise-induced urticaria     Past Surgical History:   Procedure Laterality Date    APPENDECTOMY      BUNIONECTOMY Bilateral 10/2017    CATARACT IOL, RT/LT Right     COLONOSCOPY  2014    repeat in 10 yr    COLONOSCOPY N/A 10/3/2024    Procedure: Colonoscopy;  Surgeon: Angely Forman MD;  Location: [facility] OR    DENTAL SURGERY      MAMMOPLASTY AUGMENTATION Bilateral 1972    RHYTIDECTOMY (FACELIFT)   01/23/2014    neck    SCLEROTHERAPY  10/03/2014    varicose veins    SURGICAL PATHOLOGY EXAM  01/13/2014    lower back    TONSILLECTOMY      TOTAL KNEE ARTHROPLASTY Right 12/2021    TUBAL LIGATION       Current Outpatient Medications   Medication Sig Dispense Refill    atorvastatin (LIPITOR) 10 MG tablet Take 1 tablet (10 mg) by mouth daily. 90 tablet 3    calcium carbonate 600 mg-vitamin D 400 units (CALTRATE) 600-400 MG-UNIT per tablet Take 1 tablet by mouth 2 times daily 180 tablet 3    celecoxib (CELEBREX) 200 MG capsule Take 1 capsule (200 mg) by mouth daily. 90 capsule 3    cetirizine (ZYRTEC) 10 MG tablet Take 1 tablet (10 mg) by mouth daily 90 tablet 3    cetirizine-pseudoePHEDrine ER (ZYRTEC-D) 5-120 MG 12 hr tablet Take 1 tablet by mouth 2 times daily.      DULoxetine (CYMBALTA) 30 MG capsule Take 2 daily for pain, rx from [facility]      metroNIDAZOLE (METROGEL) 1 % gel       Omega-3 Fatty Acids (FISH OIL PO)       tretinoin (RETIN-A) 0.1 % cream Apply QHS to face for rosacea      VITAMIN D PO Take 4,000 Units by mouth daily         Allergies   Allergen Reactions    Cheese     Fentanyl Other (See Comments)     May have caused pt. To pass out after cataract     Methohexital Other (See Comments)     May have caused pt. To pass out after cataract     Midazolam Other (See Comments)     May have caused pt. To pass out after cataract surgery    Seasonal Allergies         Social History     Tobacco Use    Smoking status: Never    Smokeless tobacco: Never   Substance Use Topics    Alcohol use: Yes     Comment: 0-1 per day     Family History   Problem Relation Age of Onset    Osteoarthritis Mother     Cerebrovascular Disease Mother 95    Osteoporosis Mother     Hip fracture Mother     Bladder Cancer Father 67    Breast Cancer Sister 53    Other - See Comments Sister         colon resetion, cause unclear    Heart Disease Brother 50        stents, was smoker    Hyperlipidemia Maternal Aunt     Cerebrovascular Disease  "Maternal Aunt     Hyperlipidemia Maternal Aunt     Thyroid Disease No family hx of     Thyroid Cancer No family hx of      History   Drug Use No     Review of Systems  Constitutional, neuro, ENT, endocrine, pulmonary, cardiac, gastrointestinal, genitourinary, musculoskeletal, integument and psychiatric systems are negative, except as otherwise noted.    Objective    /76 (BP Location: Left arm, Patient Position: Sitting, Cuff Size: Adult Regular)   Pulse 108   Temp 98.1  F (36.7  C) (Skin)   Resp 15   Ht 1.671 m (5' 5.79\")   Wt 76.7 kg (169 lb)   SpO2 95%   BMI 27.45 kg/m     Estimated body mass index is 27.45 kg/m  as calculated from the following:    Height as of this encounter: 1.671 m (5' 5.79\").    Weight as of this encounter: 76.7 kg (169 lb).  Physical Exam  GENERAL: alert and no distress  EYES: Eyes grossly normal to inspection, PERRL and conjunctivae and sclerae normal  HENT: ear canals and TM's normal, mild tenderness with palpation over left maxillary sinus  NECK: no adenopathy  RESP: lungs clear to auscultation  CV: regular rate and rhythm, normal S1 S2,  no murmur  ABDOMEN: soft, nontender, bowel sounds normal  MS: no gross musculoskeletal defects noted, no edema  SKIN: no suspicious lesions or rashes  NEURO: Normal strength and tone, mentation intact and speech normal  PSYCH: mentation appears normal, affect normal/bright  Ext: no edema    Recent Labs   Lab Test 04/29/25  0924   HGB 14.5         POTASSIUM 4.5   CR 0.66      No EKG required for low risk surgery (cataract, skin procedure, breast biopsy, etc).    Revised Cardiac Risk Index (RCRI)  The patient has the following serious cardiovascular risks for perioperative complications:   - No serious cardiac risks = 0 points     RCRI Interpretation: 0 points: Class I (very low risk - 0.4% complication rate)         Signed Electronically by: Sherlyn Almanzar MD  A copy of this evaluation report is provided to the requesting " physician.

## 2025-04-30 NOTE — PATIENT INSTRUCTIONS
How to Take Your Medication Before Surgery  Hold any vitamins, herbs, supplements, aspirin, ibuprofen, naproxen for 10 days prior to surgery.   Do not take any medications on the morning of your surgery  You may take acetaminophen (Tylenol) in the 10 days prior to surgery.          Patient Education   Preparing for Your Surgery  For Adults  Getting started  In most cases, a nurse will call to review your health history and instructions. They will give you an arrival time based on your scheduled surgery time. Please be ready to share:  Your doctor's clinic name and phone number  Your medical, surgical, and anesthesia history  A list of allergies and sensitivities  A list of medicines, including herbal treatments and over-the-counter drugs  Whether the patient has a legal guardian (ask how to send us the papers in advance)  Note: You may not receive a call if you were seen at our PAC (Preoperative Assessment Center).  Please tell us if you're pregnant--or if there's any chance you might be pregnant. Some surgeries may injure a fetus (unborn baby), so they require a pregnancy test. Surgeries that are safe for a fetus don't always need a test, and you can choose whether to have one.   Preparing for surgery  Within 10 to 30 days of surgery: Have a pre-op exam (sometimes called an H&P, or History and Physical). This can be done at a clinic or pre-operative center.  If you're having a , you may not need this exam. Talk to your care team.  At your pre-op exam, talk to your care team about all medicines you take. (This includes CBD oil and any drugs, such as THC, marijuana, and other forms of cannabis.) If you need to stop any medicine before surgery, ask when to start taking it again.  This is for your safety. Many medicines and drugs can make you bleed too much during surgery. Some change how well surgery (anesthesia) drugs work.  Call your insurance company to let them know you're having surgery. (If you don't  have insurance, call [phone].)  Call your clinic if there's any change in your health. This includes a scrape or scratch near the surgery site, or any signs of a cold (sore throat, runny nose, cough, rash, fever).  Eating and drinking guidelines  For your safety: Unless your surgeon tells you otherwise, follow the guidelines below.  Eat and drink as normal until 8 hours before you arrive for surgery. After that, no food or milk. You can spit out gum when you arrive.  Drink clear liquids until 2 hours before you arrive. These are liquids you can see through, like water, Gatorade, and Propel Water. They also include plain black coffee and tea (no cream or milk).  No alcohol for 24 hours before you arrive. The night before surgery, stop any drinks that contain THC.  If your care team tells you to take medicine on the morning of surgery, it's okay to take it with a sip of water. No other medicines or drugs are allowed (including CBD oil)--follow your care team's instructions.  If you have questions the day of surgery, call your hospital or surgery center.   Preventing infection  Shower or bathe the night before and the morning of surgery. Follow the instructions your clinic gave you. (If no instructions, use regular soap.)  Don't shave or clip hair near your surgery site. We'll remove the hair if needed.  Don't smoke or vape the morning of surgery. No chewing tobacco for 6 hours before you arrive. A nicotine patch is okay. You may spit out nicotine gum when you arrive.  For some surgeries, the surgeon will tell you to fully quit smoking and nicotine.  We will make every effort to keep you safe from infection. We will:  Clean our hands often with soap and water (or an alcohol-based hand rub).  Clean the skin at your surgery site with a special soap that kills germs.  Give you a special gown to keep you warm. (Cold raises the risk of infection.)  Wear hair covers, masks, gowns, and gloves during surgery.  Give  antibiotic medicine, if prescribed. Not all surgeries need this medicine.  What to bring on the day of surgery  Photo ID and insurance card  Copy of your health care directive, if you have one  Glasses and hearing aids (bring cases)  You can't wear contacts during surgery  Inhaler and eye drops, if you use them (tell us about these when you arrive)  CPAP machine or breathing device, if you use them  A few personal items, if spending the night  If you have . . .  A pacemaker, ICD (cardiac defibrillator), or other implant: Bring the ID card.  An implanted stimulator: Bring the remote control.  A legal guardian: Bring a copy of the certified (court-stamped) guardianship papers.  Please remove any jewelry, including body piercings. Leave jewelry and other valuables at home.  If you're going home the day of surgery  You must have a responsible adult drive you home. They should stay with you overnight as well.  If you don't have someone to stay with you, and you aren't safe to go home alone, we may keep you overnight. Insurance often won't pay for this.  After surgery  If it's hard to control your pain or you need more pain medicine, please call your surgeon's office.  Questions?   If you have any questions for your care team, list them here:   ____________________________________________________________________________________________________________________________________________________________________________________________________________________________________________________________  For informational purposes only. Not to replace the advice of your health care provider. Copyright   2003, 2019 [facility]. All rights reserved. Clinically reviewed by Finn Martinez MD. [employer] [id number] - REV 08/24.

## 2025-04-30 NOTE — NURSING NOTE
"74 year old    Chief Complaint   Patient presents with    Pre-Op Exam        Blood pressure 124/76, pulse 108, temperature 98.1  F (36.7  C), temperature source Skin, resp. rate 15, height 1.671 m (5' 5.79\"), weight 76.7 kg (169 lb), SpO2 95%. Body mass index is 27.45 kg/m .    Patient Active Problem List   Diagnosis    Cervicalgia    Thyroid nodule    Primary osteoarthritis of knee    Seasonal allergic rhinitis    Rosacea    Varicose veins of both lower extremities with inflammation    Osteopenia of lumbar spine    Abnormal fasting glucose    Personal history of irradiation, presenting hazards to health    Cervical adenopathy    Elevated blood pressure reading    Other osteoarthritis involving multiple joints    Hyperlipidemia LDL goal <130    Muscle cramps    Left maxillary sinusitis          Wt Readings from Last 2 Encounters:   04/30/25 76.7 kg (169 lb)   04/29/25 76.7 kg (169 lb)       BP Readings from Last 3 Encounters:   04/30/25 124/76   04/29/25 127/86   03/24/25 (!) 151/87             Current Outpatient Medications   Medication Sig Dispense Refill    atorvastatin (LIPITOR) 10 MG tablet Take 1 tablet (10 mg) by mouth daily. 90 tablet 3    calcium carbonate 600 mg-vitamin D 400 units (CALTRATE) 600-400 MG-UNIT per tablet Take 1 tablet by mouth 2 times daily 180 tablet 3    celecoxib (CELEBREX) 200 MG capsule Take 1 capsule (200 mg) by mouth daily. 90 capsule 3    cetirizine (ZYRTEC) 10 MG tablet Take 1 tablet (10 mg) by mouth daily 90 tablet 3    DULoxetine (CYMBALTA) 30 MG capsule Take 2 daily for pain, rx from [facility]      metroNIDAZOLE (METROGEL) 1 % gel       Omega-3 Fatty Acids (FISH OIL PO)       tretinoin (RETIN-A) 0.1 % cream Apply QHS to face for rosacea      VITAMIN D PO Take 4,000 Units by mouth daily       No current facility-administered medications for this visit.          Social History     Tobacco Use    Smoking status: Never    Smokeless tobacco: Never   Vaping Use    Vaping status: Never Used " "  Substance Use Topics    Alcohol use: Yes     Comment: 0-1 per day    Drug use: No        There are no preventive care reminders to display for this patient.     No results found for: \"PAP\"        April 30, 2025 9:10 AM        "

## 2025-05-01 DIAGNOSIS — E78.5 HYPERLIPIDEMIA LDL GOAL <100: ICD-10-CM

## 2025-05-01 PROBLEM — R59.0 CERVICAL ADENOPATHY: Status: RESOLVED | Noted: 2020-10-26 | Resolved: 2025-05-01

## 2025-05-01 PROBLEM — R73.01 ABNORMAL FASTING GLUCOSE: Status: RESOLVED | Noted: 2020-02-26 | Resolved: 2025-05-01

## 2025-05-01 RX ORDER — ATORVASTATIN CALCIUM 20 MG/1
20 TABLET, FILM COATED ORAL DAILY
Qty: 90 TABLET | Refills: 3 | Status: SHIPPED | OUTPATIENT
Start: 2025-05-01

## 2025-05-02 ENCOUNTER — HOSPITAL ENCOUNTER (OUTPATIENT)
Facility: AMBULATORY SURGERY CENTER | Age: 75
Discharge: HOME | End: 2025-05-02
Attending: OTOLARYNGOLOGY
Payer: MEDICARE

## 2025-05-02 VITALS
DIASTOLIC BLOOD PRESSURE: 77 MMHG | RESPIRATION RATE: 15 BRPM | TEMPERATURE: 97.4 F | OXYGEN SATURATION: 94 % | HEIGHT: 67 IN | SYSTOLIC BLOOD PRESSURE: 127 MMHG | HEART RATE: 93 BPM | WEIGHT: 169 LBS | BODY MASS INDEX: 26.53 KG/M2

## 2025-05-02 DIAGNOSIS — J32.0 LEFT MAXILLARY SINUSITIS: Primary | ICD-10-CM

## 2025-05-02 PROCEDURE — 99000 SPECIMEN HANDLING OFFICE-LAB: CPT | Performed by: PATHOLOGY

## 2025-05-02 PROCEDURE — 88305 TISSUE EXAM BY PATHOLOGIST: CPT | Mod: TC | Performed by: OTOLARYNGOLOGY

## 2025-05-02 PROCEDURE — 88305 TISSUE EXAM BY PATHOLOGIST: CPT | Mod: 26 | Performed by: STUDENT IN AN ORGANIZED HEALTH CARE EDUCATION/TRAINING PROGRAM

## 2025-05-02 PROCEDURE — 87102 FUNGUS ISOLATION CULTURE: CPT | Performed by: OTOLARYNGOLOGY

## 2025-05-02 RX ORDER — HYDROCODONE BITARTRATE AND ACETAMINOPHEN 5; 325 MG/1; MG/1
1-2 TABLET ORAL ONCE
Status: DISCONTINUED | OUTPATIENT
Start: 2025-05-02 | End: 2025-05-03 | Stop reason: HOSPADM

## 2025-05-02 RX ORDER — OXYMETAZOLINE HYDROCHLORIDE 0.05 G/100ML
SPRAY NASAL PRN
Status: DISCONTINUED | OUTPATIENT
Start: 2025-05-02 | End: 2025-05-02 | Stop reason: HOSPADM

## 2025-05-02 RX ORDER — NALOXONE HYDROCHLORIDE 0.4 MG/ML
0.1 INJECTION, SOLUTION INTRAMUSCULAR; INTRAVENOUS; SUBCUTANEOUS
Status: DISCONTINUED | OUTPATIENT
Start: 2025-05-02 | End: 2025-05-02 | Stop reason: HOSPADM

## 2025-05-02 RX ORDER — LIDOCAINE HYDROCHLORIDE AND EPINEPHRINE 10; 10 MG/ML; UG/ML
INJECTION, SOLUTION INFILTRATION; PERINEURAL PRN
Status: DISCONTINUED | OUTPATIENT
Start: 2025-05-02 | End: 2025-05-02 | Stop reason: HOSPADM

## 2025-05-02 RX ORDER — OXYCODONE HYDROCHLORIDE 5 MG/1
10 TABLET ORAL
Status: DISCONTINUED | OUTPATIENT
Start: 2025-05-02 | End: 2025-05-03 | Stop reason: HOSPADM

## 2025-05-02 RX ORDER — NALOXONE HYDROCHLORIDE 0.4 MG/ML
0.1 INJECTION, SOLUTION INTRAMUSCULAR; INTRAVENOUS; SUBCUTANEOUS
Status: DISCONTINUED | OUTPATIENT
Start: 2025-05-02 | End: 2025-05-03 | Stop reason: HOSPADM

## 2025-05-02 RX ORDER — DEXAMETHASONE SODIUM PHOSPHATE 10 MG/ML
4 INJECTION, SOLUTION INTRAMUSCULAR; INTRAVENOUS
Status: DISCONTINUED | OUTPATIENT
Start: 2025-05-02 | End: 2025-05-03 | Stop reason: HOSPADM

## 2025-05-02 RX ORDER — HYDROMORPHONE HYDROCHLORIDE 1 MG/ML
0.2 INJECTION, SOLUTION INTRAMUSCULAR; INTRAVENOUS; SUBCUTANEOUS EVERY 5 MIN PRN
Status: DISCONTINUED | OUTPATIENT
Start: 2025-05-02 | End: 2025-05-02 | Stop reason: HOSPADM

## 2025-05-02 RX ORDER — HYDROMORPHONE HYDROCHLORIDE 1 MG/ML
0.4 INJECTION, SOLUTION INTRAMUSCULAR; INTRAVENOUS; SUBCUTANEOUS EVERY 5 MIN PRN
Status: DISCONTINUED | OUTPATIENT
Start: 2025-05-02 | End: 2025-05-02 | Stop reason: HOSPADM

## 2025-05-02 RX ORDER — FENTANYL CITRATE 50 UG/ML
25 INJECTION, SOLUTION INTRAMUSCULAR; INTRAVENOUS EVERY 5 MIN PRN
Status: DISCONTINUED | OUTPATIENT
Start: 2025-05-02 | End: 2025-05-02 | Stop reason: HOSPADM

## 2025-05-02 RX ORDER — ONDANSETRON 4 MG/1
4 TABLET, ORALLY DISINTEGRATING ORAL EVERY 30 MIN PRN
Status: DISCONTINUED | OUTPATIENT
Start: 2025-05-02 | End: 2025-05-03 | Stop reason: HOSPADM

## 2025-05-02 RX ORDER — LABETALOL HYDROCHLORIDE 5 MG/ML
10 INJECTION, SOLUTION INTRAVENOUS
Status: DISCONTINUED | OUTPATIENT
Start: 2025-05-02 | End: 2025-05-02 | Stop reason: HOSPADM

## 2025-05-02 RX ORDER — ONDANSETRON 2 MG/ML
4 INJECTION INTRAMUSCULAR; INTRAVENOUS EVERY 30 MIN PRN
Status: DISCONTINUED | OUTPATIENT
Start: 2025-05-02 | End: 2025-05-03 | Stop reason: HOSPADM

## 2025-05-02 RX ORDER — SODIUM CHLORIDE, SODIUM LACTATE, POTASSIUM CHLORIDE, CALCIUM CHLORIDE 600; 310; 30; 20 MG/100ML; MG/100ML; MG/100ML; MG/100ML
INJECTION, SOLUTION INTRAVENOUS CONTINUOUS
Status: DISCONTINUED | OUTPATIENT
Start: 2025-05-02 | End: 2025-05-02 | Stop reason: HOSPADM

## 2025-05-02 RX ORDER — FENTANYL CITRATE 50 UG/ML
50 INJECTION, SOLUTION INTRAMUSCULAR; INTRAVENOUS EVERY 5 MIN PRN
Status: DISCONTINUED | OUTPATIENT
Start: 2025-05-02 | End: 2025-05-02 | Stop reason: HOSPADM

## 2025-05-02 RX ORDER — OXYMETAZOLINE HYDROCHLORIDE 0.05 G/100ML
SPRAY NASAL
Qty: 15 ML | Refills: 0 | Status: SHIPPED | OUTPATIENT
Start: 2025-05-02

## 2025-05-02 RX ORDER — HYDROCODONE BITARTRATE AND ACETAMINOPHEN 5; 325 MG/1; MG/1
1 TABLET ORAL EVERY 4 HOURS PRN
Qty: 10 TABLET | Refills: 0 | Status: SHIPPED | OUTPATIENT
Start: 2025-05-02

## 2025-05-02 RX ORDER — ONDANSETRON 4 MG/1
4 TABLET, ORALLY DISINTEGRATING ORAL EVERY 30 MIN PRN
Status: DISCONTINUED | OUTPATIENT
Start: 2025-05-02 | End: 2025-05-02 | Stop reason: HOSPADM

## 2025-05-02 RX ORDER — OXYCODONE HYDROCHLORIDE 5 MG/1
5 TABLET ORAL
Status: DISCONTINUED | OUTPATIENT
Start: 2025-05-02 | End: 2025-05-03 | Stop reason: HOSPADM

## 2025-05-02 RX ORDER — OXYMETAZOLINE HYDROCHLORIDE 0.05 G/100ML
2 SPRAY NASAL ONCE
Status: COMPLETED | OUTPATIENT
Start: 2025-05-02 | End: 2025-05-02

## 2025-05-02 RX ORDER — DEXAMETHASONE SODIUM PHOSPHATE 10 MG/ML
10 INJECTION, SOLUTION INTRAMUSCULAR; INTRAVENOUS ONCE
Status: COMPLETED | OUTPATIENT
Start: 2025-05-02 | End: 2025-05-02

## 2025-05-02 RX ORDER — DEXAMETHASONE SODIUM PHOSPHATE 10 MG/ML
4 INJECTION, SOLUTION INTRAMUSCULAR; INTRAVENOUS
Status: DISCONTINUED | OUTPATIENT
Start: 2025-05-02 | End: 2025-05-02 | Stop reason: HOSPADM

## 2025-05-02 RX ORDER — ONDANSETRON 2 MG/ML
4 INJECTION INTRAMUSCULAR; INTRAVENOUS EVERY 30 MIN PRN
Status: DISCONTINUED | OUTPATIENT
Start: 2025-05-02 | End: 2025-05-02 | Stop reason: HOSPADM

## 2025-05-02 RX ADMIN — OXYMETAZOLINE HYDROCHLORIDE 2 SPRAY: 0.05 SPRAY NASAL at 06:32

## 2025-05-02 NOTE — BRIEF OP NOTE
[facility]    Brief Operative Note    Pre-operative diagnosis: Left maxillary sinusitis [J32.0]  Post-operative diagnosis Same as pre-operative diagnosis    Procedure: left maxillary antrostomy, Left - Nose    Surgeon: Surgeons and Role:     * Regine Reyes MD - Primary  Anesthesia: General   Estimated Blood Loss: Less than 10 ml    Drains: None  Specimens:   ID Type Source Tests Collected by Time Destination   1 : left maxillary sinus contents Tissue Sinus, Maxillary, Left SURGICAL PATHOLOGY EXAM Regine Reyes MD 5/2/2025  7:59 AM    A : left sinus culture Tissue Sinus, Maxillary, Left FUNGAL OR YEAST CULTURE ROUTINE Regine Reyes MD 5/2/2025  7:50 AM      Findings:   Likely fungal debri  Complications: None.  Implants: * No implants in log *

## 2025-05-02 NOTE — OP NOTE
[facility]     Operative Note     Pre-operative diagnosis:         Left maxillary sinusitis [J32.0]  Post-operative diagnosis        Same as pre-operative diagnosis     Procedure:      left maxillary antrostomy, Left - Nose     Surgeon:         Surgeons and Role:     * Regine Reyes MD - Primary  Anesthesia:     General             Estimated Blood Loss: Less than 10 ml     Drains: None  Specimens:       ID Type Source Tests Collected by Time Destination   1 : left maxillary sinus contents Tissue Sinus, Maxillary, Left SURGICAL PATHOLOGY EXAM Regine Reyes MD 5/2/2025  7:59 AM     A : left sinus culture Tissue Sinus, Maxillary, Left FUNGAL OR YEAST CULTURE ROUTINE Regine Reyes MD 5/2/2025  7:50 AM        Findings:                     Likely fungal debri  Complications:            None.  Implants:         * No implants in log *          Indications for procedure: Patient presents with CRS unresponsive to therapy and imaging c/w chronic fungal sinusitis of left maxillary sinus.    Description of Procedure: The patient was brought to the operating room and placed in supine position on the operating table. General anesthesia was induced, the patient was endotracheally intubated, and all appropriate monitoring lines were placed. A timeout was performed with all operating room members in agreement to the patient name and procedure being performed. Afrin-soaked pledgits were placed in the patient nose for topical decongestion.  The computer-assisted navigation device was brought into the field. This was registered and confirmed for accuracy. Using a 0-degree endoscope bilateral nasal cavities were evaluated, a total of 3cc of 1% lidocaine with 1: 100,000 epinephrine was injected to the root of the left middle turbinate, body of the middle turbinate and inferior turbinate.     We then proceeded with a left-sided sinus surgery.  The inferior turbinate was gently  lateralized, the middle turbinate was gently medialized. We identified the uncinate process, the vertical component was removed using a microdebrider and straight thru-cut.  We then used an angled scope to visualize the natural os of the maxillary sinus and dilated this posteriorly and anteriorly.  We then incorporated this into a widened antrostomy with a straight thru cut, backbiter and microdebrider.  The maxillary sinus was copiously irrigated and any a moderate amount of fungal appearing debri was removed. This completed the procedure and the patient was turned over to anesthesia.

## 2025-05-02 NOTE — DISCHARGE INSTRUCTIONS
[facility]  Home Care Following Anesthesia  For 24 hours after surgery:  Get plenty of rest.  A responsible adult must stay with you for at least 24 hours after you leave the surgery center.  Do not drive or use heavy equipment.  If you have weakness or tingling, don't drive or use heavy equipment until this feeling goes away.   Do not drink alcohol.   Avoid strenuous or risky activities.  Ask for help when climbing stairs.  You may feel lightheaded.  IF so, sit for a few minutes before standing.  Have someone help you get up.   If you have nausea (feel sick to your stomach): Drink only clear liquids such as apple juice, ginger ale, broth or 7-Up.  Rest may also help.  Be sure to drink enough fluids.  Move to a regular diet as you feel able.   You may have a slight fever.  Call the doctor if your fever is over 100 F (37.7 C) (taken under the tongue) or lasts longer than 24 hours.  You may have a dry mouth, a sore throat, muscle aches or trouble sleeping. These should go away after 24 hours.  Do not make important or legal decisions.   It is recommended to avoid smoking.               Tips for taking pain medications  To get the best pain relief possible, remember these points:  Take pain medications as directed, before pain becomes severe.  Pain medication can upset your stomach: taking it with food may help.  Constipation is a common side effect of pain medication. Drink plenty of  fluids.  Eat foods high in fiber. Take a stool softener if recommended by your doctor or pharmacist.  Do not drink alcohol, drive or operate machinery while taking pain medications.  Ask about other ways to control pain, such as with heat, ice or relaxation.    Tylenol/Acetaminophen Consumption    If you feel your pain relief is insufficient, you may take Tylenol/Acetaminophen in addition to your narcotic pain medication.   Be careful not to exceed 4,000 mg of Tylenol/Acetaminophen in a 24 hour  period from all sources.  If you are taking extra strength Tylenol/acetaminophen (500 mg), the maximum dose is 8 tablets in 24 hours.  If you are taking regular strength acetaminophen (325 mg), the maximum dose is 12 tablets in 24 hours.    Call a doctor for any of the following:  Signs of infection (fever, growing tenderness at the surgery site, a large amount of drainage or bleeding, severe pain, foul-smelling drainage, redness, swelling).  It has been over 8 to 10 hours since surgery and you are still not able to urinate (pass water).  Headache for over 24 hours.  Numbness, tingling or weakness the day after surgery (if you had spinal anesthesia).  Signs of Covid-19 infection (temperature over 100 degrees, shortness of breath, cough, loss of taste/smell, generalized body aches, persistent headache, chills, sore throat, nausea/vomiting/diarrhea)    Your doctor is:  Dr. Regine Reyes, ENT Otolaryngology: [phone]                    After hours and weekends call the hospital @ [phone] and ask for the resident on call for:  ENT Otolaryngology  For emergency care, call the:  [facility]:  [phone] (TTY for hearing impaired: [phone])

## 2025-05-05 ENCOUNTER — TRANSFERRED RECORDS (OUTPATIENT)
Dept: HEALTH INFORMATION MANAGEMENT | Facility: CLINIC | Age: 75
End: 2025-05-05
Payer: MEDICARE

## 2025-05-05 LAB
PATH REPORT.COMMENTS IMP SPEC: NORMAL
PATH REPORT.COMMENTS IMP SPEC: NORMAL
PATH REPORT.FINAL DX SPEC: NORMAL
PATH REPORT.GROSS SPEC: NORMAL
PATH REPORT.MICROSCOPIC SPEC OTHER STN: NORMAL
PATH REPORT.RELEVANT HX SPEC: NORMAL
PHOTO IMAGE: NORMAL

## 2025-05-08 LAB — BACTERIA TISS BX CULT: NORMAL

## 2025-05-12 ENCOUNTER — OFFICE VISIT (OUTPATIENT)
Dept: OTOLARYNGOLOGY | Facility: CLINIC | Age: 75
End: 2025-05-12
Payer: MEDICARE

## 2025-05-12 VITALS
HEIGHT: 61 IN | BODY MASS INDEX: 32.2 KG/M2 | OXYGEN SATURATION: 96 % | HEART RATE: 90 BPM | DIASTOLIC BLOOD PRESSURE: 102 MMHG | SYSTOLIC BLOOD PRESSURE: 152 MMHG

## 2025-05-12 DIAGNOSIS — J32.0 CHRONIC MAXILLARY SINUSITIS: Primary | ICD-10-CM

## 2025-05-12 PROCEDURE — 31231 NASAL ENDOSCOPY DX: CPT | Mod: 52 | Performed by: OTOLARYNGOLOGY

## 2025-05-12 PROCEDURE — 3080F DIAST BP >= 90 MM HG: CPT | Performed by: OTOLARYNGOLOGY

## 2025-05-12 PROCEDURE — 99212 OFFICE O/P EST SF 10 MIN: CPT | Mod: 25 | Performed by: OTOLARYNGOLOGY

## 2025-05-12 PROCEDURE — 3077F SYST BP >= 140 MM HG: CPT | Performed by: OTOLARYNGOLOGY

## 2025-05-12 PROCEDURE — 1126F AMNT PAIN NOTED NONE PRSNT: CPT | Performed by: OTOLARYNGOLOGY

## 2025-05-12 ASSESSMENT — PAIN SCALES - GENERAL: PAINLEVEL_OUTOF10: NO PAIN (0)

## 2025-05-12 NOTE — PROGRESS NOTES
"[facility] E[facility] AND THROAT CLINIC [address] [address]  Phone: [phone]  Fax: [phone]    Patient:  Yanely Jon, Date of birth 1950  Date of Visit:  05/12/2025  Referring Provider Koki Cuevas      Assessment & Plan      Post-operative care following left maxillary antrostomy  - Healing well post-surgery for removal of fungal debris. Sinus drainage is expected to improve over time.  - Continue using nasal sprays. Follow up as needed and call if any issues arise.  - Visit diagnosis codes: Z48.815 (Encounter for surgical aftercare following surgery on the respiratory system), B48.8 (Other specified mycoses).    15 minutes spent by me on the date of the encounter doing chart review, history and exam, documentation and further activities per the note. This time is in addition to separately billable procedure.         History of Present Illness     Pertinent history obtain from: chart review and patient    Ashtyn Jon, a 74-year-old female, is 10 days post left maxillary antrostomy for the removal of fungal debris. She reports no pain following the procedure.  She experienced some tiredness post-surgery, but is otherwise doing well.  There is still a little drainage, but it is expected to improve over time. She notes an improvement in her ability to breathe and a slight improvement in her sense of smell, which had been poor prior to the surgery.    Physical Exam     Vital signs:  BP (!) 152/102 (BP Location: Right arm, Patient Position: Sitting)   Pulse 90   Ht 1.543 m (5' 0.75\")   SpO2 96%   BMI 32.20 kg/m      Procedure:  Endoscopy indicated for post op debridement  Topical anesthetic/decongestant spray applied.  Rigid scope used for visualization.  Findings: left antrostomy patent, scant debri noted. Suction performed.                Consent was obtained from the patient to use an AI documentation tool in the creation " of  this note

## 2025-05-12 NOTE — NURSING NOTE
"Chief Complaint   Patient presents with    RECHECK   Blood pressure (!) 167/95, pulse 90, height 1.543 m (5' 0.75\"), SpO2 96%. Bryan Henry, EMT    "

## 2025-05-12 NOTE — PATIENT INSTRUCTIONS
You were seen in the ENT Clinic today by Dr. Reyes. If you have any questions or concerns after your appointment, please contact us (see below)       2.   Plan to return to the ENT clinic as needed.           How to Contact Us:  Send a LAVEGO message to your provider. Our team will respond to you via LAVEGO. Occasionally, we will need to call you to get further information.  For urgent matters (Monday-Friday), call the ENT Clinic: [phone] and speak with a call center team member - they will route your call appropriately.   If you'd like to speak directly with a nurse, please find our contact information below. We do our best to check voicemail frequently throughout the day, and will work to call you back within 1-2 days. For urgent matters, please use the general clinic phone numbers listed above.     Chanelle IBRAHIM RN  Direct: [phone]  Felecia KWONG LPN  Direct: [phone]         [facility]  Department of Otolaryngology

## 2025-05-12 NOTE — LETTER
"5/12/2025       RE: Yanely Jon  [address]  [place] [place] [facility] [address]     Dear Colleague,    Thank you for referring your patient, Yanely Jon, to the [facility] E[facility] NOSE AND THROAT CLINIC [place] at [facility]. Please see a copy of my visit note below.      [facility] E[facility] NOSE AND THROAT [facility]  [address]  4TH FLOOR  [place] [address]  Phone: [phone]  Fax: [phone]    Patient:  Yanely Jon, Date of birth 1950  Date of Visit:  05/12/2025  Referring Provider Koki Cuevas      Assessment & Plan     Post-operative care following left maxillary antrostomy  - Healing well post-surgery for removal of fungal debris. Sinus drainage is expected to improve over time.  - Continue using nasal sprays. Follow up as needed and call if any issues arise.  - Visit diagnosis codes: Z48.815 (Encounter for surgical aftercare following surgery on the respiratory system), B48.8 (Other specified mycoses).    15 minutes spent by me on the date of the encounter doing chart review, history and exam, documentation and further activities per the note. This time is in addition to separately billable procedure.         History of Present Illness    Pertinent history obtain from: chart review and patient    Ashtyn Jon, a 74-year-old female, is 10 days post left maxillary antrostomy for the removal of fungal debris. She reports no pain following the procedure.  She experienced some tiredness post-surgery, but is otherwise doing well.  There is still a little drainage, but it is expected to improve over time. She notes an improvement in her ability to breathe and a slight improvement in her sense of smell, which had been poor prior to the surgery.    Physical Exam    Vital signs:  BP (!) 152/102 (BP Location: Right arm, Patient Position: Sitting)   Pulse 90   Ht 1.543 m (5' 0.75\")   SpO2 96%  "  BMI 32.20 kg/m      Procedure:  Endoscopy indicated for post op debridement  Topical anesthetic/decongestant spray applied.  Rigid scope used for visualization.  Findings: left antrostomy patent, scant debri noted. Suction performed.                Consent was obtained from the patient to use an AI documentation tool in the creation of  this note    Again, thank you for allowing me to participate in the care of your patient.      Sincerely,    Regine Reyes MD

## 2025-05-15 LAB — BACTERIA TISS BX CULT: NORMAL

## 2025-05-22 LAB — BACTERIA TISS BX CULT: NORMAL

## 2025-05-29 LAB — BACTERIA TISS BX CULT: NORMAL

## 2025-06-04 ENCOUNTER — OFFICE VISIT (OUTPATIENT)
Dept: ENDOCRINOLOGY | Facility: CLINIC | Age: 75
End: 2025-06-04
Payer: MEDICARE

## 2025-06-04 VITALS
SYSTOLIC BLOOD PRESSURE: 138 MMHG | WEIGHT: 181 LBS | BODY MASS INDEX: 29.09 KG/M2 | OXYGEN SATURATION: 98 % | HEIGHT: 66 IN | HEART RATE: 103 BPM | DIASTOLIC BLOOD PRESSURE: 84 MMHG

## 2025-06-04 DIAGNOSIS — R59.0 CERVICAL ADENOPATHY: ICD-10-CM

## 2025-06-04 DIAGNOSIS — Z92.3 PERSONAL HISTORY OF IRRADIATION, PRESENTING HAZARDS TO HEALTH: ICD-10-CM

## 2025-06-04 DIAGNOSIS — E04.1 THYROID NODULE: Primary | ICD-10-CM

## 2025-06-04 PROCEDURE — G2211 COMPLEX E/M VISIT ADD ON: HCPCS

## 2025-06-04 PROCEDURE — 3075F SYST BP GE 130 - 139MM HG: CPT

## 2025-06-04 PROCEDURE — 3079F DIAST BP 80-89 MM HG: CPT

## 2025-06-04 PROCEDURE — 1125F AMNT PAIN NOTED PAIN PRSNT: CPT

## 2025-06-04 PROCEDURE — 99214 OFFICE O/P EST MOD 30 MIN: CPT | Mod: GC

## 2025-06-04 ASSESSMENT — PAIN SCALES - GENERAL: PAINLEVEL_OUTOF10: MODERATE PAIN (5)

## 2025-06-04 NOTE — PROGRESS NOTES
Endocrinology Clinic     Yanely Jon MRN:[id number] YOB: 1950  Primary care provider: Sherlyn Almanzar     Medical Decision Making:   #Thyroid nodules, sub-centimetric  # Perithyroidal adenopathy    First noted on MRI in 2020, FNAB of right level 3 #1 and right level 7 #2 lymph node came back benign in 1/23/2025, flow cytometry showing benign finding and thyroglobulin level normal. FNAB of LN  benign in 2020 as well. Have h/o childhood exposure to shoe fluoroscope increasing her risk for thyroid nodules and malignancy.    Plan:   Next US in one year.    # Low bone density- had treatment with alendronate for 5 years in the past, bone gain on alendronate therapy.Managed by Dr. Almanzar. Risk factors include post menopausal, advanced age, h/o hip fracture in mother and corticosteroid injection in back.       Return in about 1 year (around 6/4/2026).    History of Present Illness:   Yanely Jon a 75 year old is here for follow up of thyroid nodules and perithyroidal lymph nodes..    Interval Events:  Sleep and energy is good.  Completed alendronate 5 year treatment for bones.  Reports inability to loose weight despite eating healthy and exercising daily.  No falls/fractures since last visit.    History of problem:  Patient is a 75 year old female with h/o thyroid nodule, HLD, osteopenia coming to clinic to follow up on her thyroid nodule and perithyroid lymph nodes.    H/o thyroid nodules:- Thyroid nodule was lst seen on an MRI  Since then, she has had thyorid US x 5.  We performed FNAB on some LNs in 2020, with benign cytology. FNAC in 01/2025 of right level 3 and right level 7 #2, came back benign. Pt was exposed to the shoe store fluoroscope at [facility] around age 4 years.     Pertinent labs:  10/6/16 HgbA1c 5.6%  12/11/19 TSH 2.58, creatinine 0.7, Ca 9.4, glucose 106, cholesterol 262,   2/26/2020 calcitonin < 2, TPO < 10, HgbA1c 5.7%  12/14/2020 glucose 95, Ca 9, creatinine  0.59  23 Ca 9.9, creatinine 0.6, glucose 103  23 TSH 1.19, free T4 1.29  2025- Tg- <0.4, TG antibody- <0.1 ( right level 7 lymph node aspirate and right level 3 # 1 LN,  flow cytometry- polytypic B cell from right level 3 # 1 lymph node  2025- glucose- 100, creatinine- 0.6, calcium- 9.8 ( Fasting >8 hour)    Pertinent imagin2024  US thyroid: right nodule 1, 1 x 0.7 x0.5 cm, solid, hyperechoic/isoechoic wit punctate foci, left inferior nodule #2- 1.2 cm, solid, hypoechoic, left inferior nodule# 3, 0.9 cm, solid, hypoechoic  US head and neck:-right level 3 0.9 cm oval lymph node not seen on previous exam, level 7 # 2, 1.3 cm round with large fatty him, new LN. All other LN decreased in size compared to previous measurements.        ROS:  All 12 systems were reviewed and negative except as mentioned in HPI    Past Medical/Surgical History:  Past Medical History:   Diagnosis Date    Acrochordon     Atypical chest pain     Normal stress test in , performed for atypical chest pain    Benign nevus of skin     Numerous benign skin nevi    Biceps tendonitis     Bilateral bunions     Breast implant leak     Breast implants complicated by a leak. She then underwent replacement and repair.     Chronic fatigue     H/O urinary incontinence     Mild occasional female urinary incontinence which seems to be both urge and stress related.    History of bilateral cataract extraction     Hx of tubal ligation     Left hip pain     Occasional left hip pain due to osteoarthritis and/or trochanteric bursitis.    Lipoma of back     Chronic lipoma of the left back, status post-surgical resection in 2014    Low back pain     Occasional low back pain with radicular symptoms into the legs.    Osteoarthritis of knees, bilateral     Osteoarthritis of left hip     Osteopenia 2013    Postmenopausal status     Prediabetes 2020    Pubic bone pain     Musculoskeletal pubic bone pain due to a  strain.    Rosacea     Seborrheic keratoses     Skin lesions     Atypical melanocytic lesions, status post multiple biopsies by Dr. Rivera in July of 2015. These biposies showed a junctional nevus with moderate atypia, which is also a dysplastic nevus and the final one was also a compound nevus, with mild atypia which is also a dysplastic nevus.  She was contacted by Dr. Rivera and was told to follow up with her again in July 2016, for re-evaluation of her skin.    Skin tag     Solar elastosis     Solar lentiginosis     Sternal fracture 2015    Mid-sternal fracture after blunt trauma while falling while running up stepsin May 2015. She may have also had some rib fractures as well. These healed with conservative management.    Symptomatic varicose veins of both lower extremities 2015    Lower extremity venous varicosities, intermittently symptomatic, status post evaluation with Dr. Alonso in General Surgery in the summer of 2015.    Symptomatic varicose veins of both lower extremities     Status post injection and compression sclerotherapy    Syncope 2018    Urticaria     Questionable exercise-induced urticaria     Past Surgical History:   Procedure Laterality Date    APPENDECTOMY      BUNIONECTOMY Bilateral 10/2017    CATARACT IOL, RT/LT Right     COLONOSCOPY  2014    repeat in 10 yr    COLONOSCOPY N/A 10/3/2024    Procedure: Colonoscopy;  Surgeon: Angely Forman MD;  [place] [facility] [place]    DENTAL SURGERY      MAMMOPLASTY AUGMENTATION Bilateral 1972    OPTICAL TRACKING SYSTEM ENDOSCOPIC SINUS SURGERY Left 5/2/2025    Procedure: left maxillary antrostomy;  Surgeon: Regine Reyes MD;  [place] [facility] [place]    RHYTIDECTOMY (FACELIFT)  01/23/2014    neck    SCLEROTHERAPY  10/03/2014    varicose veins    SURGICAL PATHOLOGY EXAM  01/13/2014    lower back    TONSILLECTOMY      TOTAL KNEE ARTHROPLASTY Right 12/2021    TUBAL LIGATION         Allergies:  Allergies   Allergen Reactions    Fentanyl Other (See Comments)     May have  caused pt. To pass out after cataract     Methohexital Other (See Comments)     May have caused pt. To pass out after cataract     Midazolam Other (See Comments)     May have caused pt. To pass out after cataract surgery    Seasonal Allergies        Current meds:   Current Outpatient Medications   Medication Sig Dispense Refill    atorvastatin (LIPITOR) 20 MG tablet Take 1 tablet (20 mg) by mouth daily. Note change in dose 90 tablet 3    calcium carbonate 600 mg-vitamin D 400 units (CALTRATE) 600-400 MG-UNIT per tablet Take 1 tablet by mouth 2 times daily 180 tablet 3    celecoxib (CELEBREX) 200 MG capsule Take 1 capsule (200 mg) by mouth daily. 90 capsule 3    cetirizine (ZYRTEC) 10 MG tablet Take 1 tablet (10 mg) by mouth daily 90 tablet 3    cetirizine-pseudoePHEDrine ER (ZYRTEC-D) 5-120 MG 12 hr tablet Take 1 tablet by mouth 2 times daily.      DULoxetine (CYMBALTA) 30 MG capsule Take 2 daily for pain, rx from [facility]      HYDROcodone-acetaminophen (NORCO) 5-325 MG tablet Take 1 tablet by mouth every 4 hours as needed (Moderate to Severe Pain). 10 tablet 0    metroNIDAZOLE (METROGEL) 1 % gel       Omega-3 Fatty Acids (FISH OIL PO)       oxymetazoline (AFRIN NASAL SPRAY) 0.05 % nasal spray Use 2 sprays to each nare two times daily for up to 3 days. 15 mL 0    sodium chloride (OCEAN) 0.65 % nasal spray Spray 1-2 sprays in nostril every hour as needed for congestion (nasal dryness). Use in EACH nostril. 44 mL 1    tretinoin (RETIN-A) 0.1 % cream Apply QHS to face for rosacea      VITAMIN D PO Take 4,000 Units by mouth daily       No current facility-administered medications for this visit.       Family History:  Family History   Problem Relation Age of Onset    Osteoarthritis Mother     Cerebrovascular Disease Mother 95    Osteoporosis Mother     Hip fracture Mother     Bladder Cancer Father 67    Breast Cancer Sister 53    Other - See Comments Sister         colon resetion, cause unclear    Sinusitis Sister          had sinus surgery    Heart Disease Brother 50        stents, was smoker    Hyperlipidemia Maternal Aunt     Cerebrovascular Disease Maternal Aunt     Hyperlipidemia Maternal Aunt     Thyroid Disease No family hx of     Thyroid Cancer No family hx of        Social History:  Social History     Tobacco Use    Smoking status: Never    Smokeless tobacco: Never   Substance Use Topics    Alcohol use: Yes     Comment: 0-1 per day           Pertinent Physical Exam:   There were no vitals taken for this visit.    Not in acute distress.  Sitting comfortably in chair.  No thyroid palpable.  No lymphadenopathy except small left mobile level 2 lymph node.  Normal lung sounds.  No wheezing or crackles.  Normal S1, S2,.  Normal rate and rhythm.  No murmurs or gallops.  No tremors.  No lower extremity edema.  Normal mood and affect.     Pertinent Labs and Imagin/04/2025  US head and neck  Right:     Level 2: 1.4 x 0.5 x 1.4 cm lymph node with fatty hilum, similar in  morphology to previous exam  Level 3: 0.8 x 0.5 x 0.9 cm oval lymph node not seen on previous exam  Level 4: 0.8 x 0.3 x 0.8 cm lymph node which previously measured 0.7 x  0.4 x 1.0 cm  Level 7: 0.8 x 0.6 x 0.7 cm oval node which previously measured 0.7 x  0.5 x 0.7 cm  1.3 x 0.7 x 0.8 cm round lymph node with large fatty hilum not  measured on previous exam     Left:  Level 2: 0.9 x 0.7 x 1.3 cm lymph node with fatty hilum reduced in  overall size from comparison  0.8 x 0.5 x 1.2 cm oval lymph node which previously measured 0.6 x 0.4  x 1.2 cm  Level 3: 1.0 x 0.5 x 1.8 cm lymph node which previously measured 0.9 x  0.5 x 1.5 cm  Level 7: 1.3 x 0.5 x 0.9 cm lymph node with small fatty hilum,  previously 1.2 x 0.5 x 0.8 cm       Discussed with Attending  Phoenix Polo  Endocrine Fellow  Pager # [phone]

## 2025-06-04 NOTE — PATIENT INSTRUCTIONS
See me in a year with US thyroid/neck to be done at the [facility] on [address] prior to the appt

## 2025-06-04 NOTE — LETTER
6/4/2025       RE: Yanely Jon  [address]  [facility] [address]     Dear Colleague,    Thank you for referring your patient, Yanely Jon, to the [facility] ENDOCR[facility] [place] at [facility]. Please see a copy of my visit note below.    05/27/25 10:58 AM  PATIENT LAB/IMAGING STATUS : No pending lab orders       Attending tie-in statement: Patient seen and examined by me, discussed with fellow whose note I have reviewed and with which I agree.   Key elements and diagnostic points include the following:    Multiple thyroid nodules, all subcm with perithyroidal adenopathy She has history of radiation exposure as a child which increases her risk of thyroid nodules and malignancy.   Next US in one year and see me afterwards    perithyroidal adenopathy- benign cytology on bilateral lateral neck LNs 2020.    Right level 3 #1 and right level 7 # 2 benign cytology 1/2025     Low bone density. Family history of hip fracture in her mother.     Alendronate [id number];  Dr Almanzar is managing it.     History of  corticosteroid injections for her back - this is a risk for the bone - as above.      Post menopausal    History of radiation exposure presenting health risks - Shoe store fluroscope exposure as a child approx age 4.  As above.      The Longitudinal plan of care for nodular thyroid in the context of childhood radiation exposure was addressed during this visit. Due to added complexity of care, we will continue to support [place] , and the subsequent management of this condition(s) and with the ongoing continuity of care of this condition.      25__ minutes spent on the date of the encounter doing chart review, history and exam, documentation and further activities as noted above.    Olga Murry MD    I last saw her 11/2024.  Since then, she had FNAB of 2 cervical LNs, both with benign cytology.     Thyroid nodule was  lst seen on an MRI  Since then, she has had thyorid US x 5.  We performed FNAB on some LNs in 2020, with benign cytology.   Ashtyn was exposed to the shoe store fluoroscope at [facility] around age 4 years.     She  took alendronate  2/2020 through 4/2025, managed by Dr Almanzar .    We have the following labs:   10/6/16 HgbA1c 5.6%  12/11/19 TSH 2.58, creatinine 0.7, Ca 9.4, glucose 106, cholesterol 262,   2/26/2020 calcitonin < 2, TPO < 10, HgbA1c 5.7%  12/14/2020 glucose 95, Ca 9, creatinine 0.59  4/24/23 Ca 9.9, creatinine 0.6, glucose 103  5/4/23 TSH 1.19, free T4 1.29  1/23/25 FNAB right level 3 # 1 and right level 7 # 2 polymorphous populatioin of lymphocytes; Flow cytometry on right level 3 # 1 polytypic B cells .    I have reviewed images on pACS  6/14/24 DXA lowest T-score -1.6 left femoral neck ; Bone gain since 2022 and 2019.   11/4/24 thyroid and neck US compared with 11/21/23 , 11/22/2021,  9/10/2020,  12/19-- as read by me again today   Right # 1 0.7 x 0.5 x 1 was 0.8 x 0.4 x 1.1 (0.18 CM3)anterior isthmus was right # 2 0.6 x 0.4 x 0.9  (0.11 CM3);  0.7 x 0.5 x 0.8 (0.14 CM3; 2019)  Left # 2  0.9 x 0.7 x 1.2 cm was 0.8 x 0.6 x 1.2 cm (0.29 CM3) solid hypoechoic anterior was 0.7 x 0.6 x 0.9 cm mixed (0.19 CM3);  0.7 x 0.7 x 0.7;  0.7 x 0.6 x 0.7 cm (0.15 CM3) )  Left # 3 0.7 x 0.3 x 0.9 cm was 0.8 x 0.4 x 0.8 was ?  left # 5  0.6 x 0.3 x 0.6   Right level 3 # 1 0.8 x 0.5 x 0.9 cm new?  This looks a lot like right level 4 # 2 from 2020 FNAB 1/23/25  polymorphous lymphocytes  Right level 4# 1 0.3 x 0.8 x 0.7 cm was ? Right level 4 # 1 0.4 x 0.7 x 1 was  0.4 x 0.5 x was  0.9 x 0.5 x 0.7 cm FNAB 11/23/2020 benign    Right level 7 # 1  0.8 x 0.6 x 0.7 cm was 0.7 x 0.5 x 0.7 cm was 1.0 x 0.6 x 1.1 cm;  1.3 x 0.7 x 1.1 cm  Right level 7 # 2  1.3 x 0.7 x 0.8 cm FNAB 1/23/25  polymorphous lymphocytes  Left level 3 1 x 0.5 x 1.8 cm was 0.9 x 0.5 x 1.5 cm was 0.9 x 0.4 x 1.7 cm;    0.9 x 0.5 x 2 cm ; 0.8 x 0.4 x 1.7  "cm)- FNAB 11/23/20- benign   Left level 7 # 1  1.3 x 0.5 x 0.9 cm was 1.2 x 0.5 x 0.8 cm was  1.2 x 0.4 x 1.2; 1.1 x 0.6 x 0.9 cm      REVIEW OF SYSTEMS  Weight concern , eating low carb diet  Occasional difficulty swallowing   Completed 5 year course of alendronate     Busy with grandchildren;     Physical Exam  GENERAL no mask  /84   Pulse 103   Ht 1.676 m (5' 6\")   Wt 82.1 kg (181 lb)   SpO2 98%   BMI 29.21 kg/m    SKIN: normal color, temperature, texture without hirsutism, alopecia   HEENT: PER, no scleral icterus, eyelid retraction, stare, lid lag, proptosis or conjunctival injection.  .    NECK: supple.  No visible or palpable neck masses, cervical adenopathy, or goiter.   LUNGS: clear to auscultation bilaterally.   CARDIAC: RRR, S1, S2 without murmurs, rubs or gallops.    BACK: normal spinal contour.    NEURO: Alert, responds appropriately to questions,  moves all extremities, DTRs 2/4,  no tremor of the outstretched hand    DATA REVIEW       Fine Needle Aspirate Lymph Node(s), Cervical, Right: [id number]  Order: [id number]  Collected 1/23/2025  9:50 AM       Status: Final result       Visible to patient: Yes (not seen)       Dx: Personal history of irradiation, pres...    0 Result Notes      Component    Final Diagnosis   A. LYMPH NODE(S), CERVICAL, RIGHT, LEVEL 3 #1, FINE NEEDLE ASPIRATE:  Interpretation -   - Polymorphous population of lymphocytes present, see comment  - No evidence of epithelial malignancy     Adequacy: Satisfactory for evaluation     B. LYMPH NODE(S), CERVICAL, RIGHT, LEVEL 7 #2, FINE NEEDLE ASPIRATE:  Interpretation -   - Polymorphous population of lymphocytes present, see comment  - No evidence of epithelial malignancy     Adequacy: Satisfactory for evaluation   Electronically signed by Charlie Reese MD on 1/24/2025 at  2:11 PM   Comment    Please correlate with concurrent flow cytometry case [id number].   Clinical Information    74F with cervical " lymphadenopathy   Rapid Onsite Evaluation    FNA Performance:   Fine needle aspiration was not performed by [facility] staff.     Aspirate immediate study/adequacy:  I, ISRAEL SERRANO III, MD, attest that I immediately examined smears while the procedure was underway and determined or confirmed the adequacy of the specimens via telepathology.     It is of note that the final assessment and report may be performed and signed by a different pathologist.     Onsite adequacy/interpretation:  A: Adequate  B: Adequate      Gross Description    A(A). Lymph Node(s), Cervical, Right, level 3 #1:A. Lymph Node(s), Cervical, Right, level 3 #1, Fine Needle Aspirate:  Received are 2 fixed slides, processed for Pap stain, and 3 air dried slides, processed for Diff Quik stain.  RPMI sent to [facility]     B(B). Lymph Node(s), Cervical, Right, level 7 #2:B. Lymph Node(s), Cervical, Right, level 7 #2, Fine Needle Aspirate:  Received are 2 fixed slides, processed for Pap stain, and 3 air dried slides, processed for Diff Quik stain.  RPMI held.    Microscopic Description       Case was reviewed by the following:  Pathology Fellow: Olga Duenas DO  Pathology Fellow: Ladi Espana MD  Resident Pathologist: Myron Vaughn MD  A resident or fellow in a training program was involved in the initial review, preparation, and/or interpretation of this case.  I, as the senior physician, attest that I have personally reviewed all specimens and or slides, including the listed special stains, and used them with my medical judgement to determine the final diagnosis.               Performing Labs    The technical component of this testing was completed at [facility].      Stain controls for all stains resulted within this report have been reviewed and show appropriate reactivity.    Resulting Agency [facility]             Flow Cytometry: [id number]  Order:  [id number]  Collected 1/23/2025 10:00 AM       Status: Final result       Visible to patient: No (scheduled for 1/31/2025 11:49 AM)    Specimen Information: Lymph Node(s), Cervical, Right; Fine Needle Aspiration   0 Result Notes      Component    Case Report   Flow Cytometry Report                             Case: [id number]                                   Authorizing Provider:  Olga Murry MD     Collected:           01/23/2025 10:00 AM           Ordering Location:     [facility]  Received:            01/23/2025 10:40 AM                                  [facility]                                                         Pathologist:           Arline Dee MD                                                         Specimen:    Lymph Node(s), Cervical, Right, Level 3 #1                                                Flow Interpretation   A. Fine Needle Aspiration, Lymph Node(s), Cervical, Right:  -Polytypic B cells  -See comment       Electronically signed by Arline Dee MD on 1/24/2025 at 11:49 AM   Comment    There is no immunophenotypic evidence of B-cell non-Hodgkin lymphoma. Hodgkin lymphoma cannot be excluded by flow cytometry. Neoplastic cells, including large cells, may not survive specimen processing. The total number of cells in this sample is low, limiting the number of antibodies that can be analyzed and limiting the interpretation. Only B-cell antigens were evaluated. This sample may not be representative. Final interpretation requires correlation with morphologic and clinical features   Flow Phenotypic Data    Unless otherwise indicated, percentages reported below are based on the total number of CD45 positive viable leukocytes. If applicable, percentage of plasma cells is from total viable nucleated cells.        7% polytypic B cells      Flow Processing Information    Multi-color flow analysis is performed for the following markers: CD5, CD10, CD19, CD20,  CD34, CD38, CD45,and kappa and lambda immunoglobulin light chains. Cells are gated to isolate populations (CD45 versus side scatter and forward scatter versus side scatter), to exclude debris (forward scatter versus side scatter) and to exclude cell doublets (forward scatter height versus forward scatter width and side scatter height versus side scatter width). Forward scatter varies with cell size. Side scatter varies with the amount of cytoplasmic granules. Intensity for CD45 usually increases as hematolymphoid cells mature.    Clinical Information    74 yrs old female with cervical adenopathy       FDA Disclaimer    This test was developed and its performance characteristics determined by the [facility]. It has not been cleared or approved by the US Food and Drug Administration.  FDA does not require this test to go through premarket FDA review. This test is used for clinical purposes and should not be regarded as investigational or for research. This laboratory is certified under the Clinical Laboratory Improvement Amendments (CLIA) as qualified to perform high complexity clinical laboratory testing.   Performing Labs    The technical component of this testing was completed at [facility].     Stain controls for all stains resulted within this report have been reviewed and show appropriate reactivity.   Resulting Agency [facility]             Specimen Collected: 01/23/25 10:00 AM Last Resulted: 01/24/25 11:49 AM      Latest Reference Range & Units 04/29/25 09:24   Sodium 135 - 145 mmol/L 141   Potassium 3.4 - 5.3 mmol/L 4.5   Chloride 98 - 107 mmol/L 103   Carbon Dioxide (CO2) 22 - 29 mmol/L 26   Urea Nitrogen 8.0 - 23.0 mg/dL 18.6   Creatinine 0.51 - 0.95 mg/dL 0.66   GFR Estimate >60 mL/min/1.73m2 90   Calcium 8.8 - 10.4 mg/dL 9.8   Anion Gap 7 - 15 mmol/L 12   Albumin 3.5 - 5.2 g/dL 4.6    Protein Total 6.4 - 8.3 g/dL 7.3   Alkaline Phosphatase 40 - 150 U/L 54   ALT 0 - 50 U/L 22   AST 0 - 45 U/L 24   Bilirubin Total <=1.2 mg/dL 0.4   Cholesterol <200 mg/dL 198   Patient Fasting?  Yes  Yes   Glucose 70 - 99 mg/dL 100 (H)   HDL Cholesterol >=50 mg/dL 74   (H): Data is abnormally high      Endocrinology Clinic     Yanely Jon MRN:[id number] YOB: 1950  Primary care provider: Sherlyn Almanzar     Medical Decision Making:   #Thyroid nodules, sub-centimetric  # Perithyroidal adenopathy    First noted on MRI in 2020, FNAB of right level 3 #1 and right level 7 #2 lymph node came back benign in 1/23/2025, flow cytometry showing benign finding and thyroglobulin level normal. FNAB of LN  benign in 2020 as well. Have h/o childhood exposure to shoe fluoroscope increasing her risk for thyroid nodules and malignancy.    Plan:   Next US in one year.    # Low bone density- had treatment with alendronate for 5 years in the past, bone gain on alendronate therapy.Managed by Dr. Almanzar. Risk factors include post menopausal, advanced age, h/o hip fracture in mother and corticosteroid injection in back.       Return in about 1 year (around 6/4/2026).    History of Present Illness:   Yanely Jon a 75 year old is here for follow up of thyroid nodules and perithyroidal lymph nodes..    Interval Events:  Sleep and energy is good.  Completed alendronate 5 year treatment for bones.  Reports inability to loose weight despite eating healthy and exercising daily.  No falls/fractures since last visit.    History of problem:  Patient is a 75 year old female with h/o thyroid nodule, HLD, osteopenia coming to clinic to follow up on her thyroid nodule and perithyroid lymph nodes.    H/o thyroid nodules:- Thyroid nodule was lst seen on an MRI  Since then, she has had thyorid US x 5.  We performed FNAB on some LNs in 2020, with benign cytology. FNAC in 01/2025 of right level 3 and right level 7 #2,  came back benign. Pt was exposed to the shoe store fluoroscope at [facility] around age 4 years.     Pertinent labs:  10/6/16 HgbA1c 5.6%  19 TSH 2.58, creatinine 0.7, Ca 9.4, glucose 106, cholesterol 262,   2020 calcitonin < 2, TPO < 10, HgbA1c 5.7%  2020 glucose 95, Ca 9, creatinine 0.59  23 Ca 9.9, creatinine 0.6, glucose 103  23 TSH 1.19, free T4 1.29  2025- Tg- <0.4, TG antibody- <0.1 ( right level 7 lymph node aspirate and right level 3 # 1 LN,  flow cytometry- polytypic B cell from right level 3 # 1 lymph node  2025- glucose- 100, creatinine- 0.6, calcium- 9.8 ( Fasting >8 hour)    Pertinent imagin2024  US thyroid: right nodule 1, 1 x 0.7 x0.5 cm, solid, hyperechoic/isoechoic wit punctate foci, left inferior nodule #2- 1.2 cm, solid, hypoechoic, left inferior nodule# 3, 0.9 cm, solid, hypoechoic  US head and neck:-right level 3 0.9 cm oval lymph node not seen on previous exam, level 7 # 2, 1.3 cm round with large fatty him, new LN. All other LN decreased in size compared to previous measurements.        ROS:  All 12 systems were reviewed and negative except as mentioned in HPI    Past Medical/Surgical History:  Past Medical History:   Diagnosis Date     Acrochordon      Atypical chest pain     Normal stress test in , performed for atypical chest pain     Benign nevus of skin     Numerous benign skin nevi     Biceps tendonitis      Bilateral bunions      Breast implant leak     Breast implants complicated by a leak. She then underwent replacement and repair.      Chronic fatigue      H/O urinary incontinence     Mild occasional female urinary incontinence which seems to be both urge and stress related.     History of bilateral cataract extraction      Hx of tubal ligation      Left hip pain     Occasional left hip pain due to osteoarthritis and/or trochanteric bursitis.     Lipoma of back     Chronic lipoma of the left back, status post-surgical resection  in January 2014     Low back pain     Occasional low back pain with radicular symptoms into the legs.     Osteoarthritis of knees, bilateral      Osteoarthritis of left hip      Osteopenia 09/13/2013     Postmenopausal status      Prediabetes 02/2020     Pubic bone pain     Musculoskeletal pubic bone pain due to a strain.     Rosacea      Seborrheic keratoses      Skin lesions     Atypical melanocytic lesions, status post multiple biopsies by Dr. Rivera in July of 2015. These biposies showed a junctional nevus with moderate atypia, which is also a dysplastic nevus and the final one was also a compound nevus, with mild atypia which is also a dysplastic nevus.  She was contacted by Dr. Rivera and was told to follow up with her again in July 2016, for re-evaluation of her skin.     Skin tag      Solar elastosis      Solar lentiginosis      Sternal fracture 2015    Mid-sternal fracture after blunt trauma while falling while running up stepsin May 2015. She may have also had some rib fractures as well. These healed with conservative management.     Symptomatic varicose veins of both lower extremities 2015    Lower extremity venous varicosities, intermittently symptomatic, status post evaluation with Dr. Alonso in General Surgery in the summer of 2015.     Symptomatic varicose veins of both lower extremities     Status post injection and compression sclerotherapy     Syncope 2018     Urticaria     Questionable exercise-induced urticaria     Past Surgical History:   Procedure Laterality Date     APPENDECTOMY       BUNIONECTOMY Bilateral 10/2017     CATARACT IOL, RT/LT Right      COLONOSCOPY  2014    repeat in 10 yr     COLONOSCOPY N/A 10/3/2024    Procedure: Colonoscopy;  Surgeon: Angely Forman MD;  Location: [facility] OR     DENTAL SURGERY       MAMMOPLASTY AUGMENTATION Bilateral 1972     OPTICAL TRACKING SYSTEM ENDOSCOPIC SINUS SURGERY Left 5/2/2025    Procedure: left maxillary antrostomy;  Surgeon: Regine Reyes MD;   Location: [facility] OR     RHYTIDECTOMY (FACELIFT)  01/23/2014    neck     SCLEROTHERAPY  10/03/2014    varicose veins     SURGICAL PATHOLOGY EXAM  01/13/2014    lower back     TONSILLECTOMY       TOTAL KNEE ARTHROPLASTY Right 12/2021     TUBAL LIGATION         Allergies:  Allergies   Allergen Reactions     Fentanyl Other (See Comments)     May have caused pt. To pass out after cataract      Methohexital Other (See Comments)     May have caused pt. To pass out after cataract      Midazolam Other (See Comments)     May have caused pt. To pass out after cataract surgery     Seasonal Allergies        Current meds:   Current Outpatient Medications   Medication Sig Dispense Refill     atorvastatin (LIPITOR) 20 MG tablet Take 1 tablet (20 mg) by mouth daily. Note change in dose 90 tablet 3     calcium carbonate 600 mg-vitamin D 400 units (CALTRATE) 600-400 MG-UNIT per tablet Take 1 tablet by mouth 2 times daily 180 tablet 3     celecoxib (CELEBREX) 200 MG capsule Take 1 capsule (200 mg) by mouth daily. 90 capsule 3     cetirizine (ZYRTEC) 10 MG tablet Take 1 tablet (10 mg) by mouth daily 90 tablet 3     cetirizine-pseudoePHEDrine ER (ZYRTEC-D) 5-120 MG 12 hr tablet Take 1 tablet by mouth 2 times daily.       DULoxetine (CYMBALTA) 30 MG capsule Take 2 daily for pain, rx from [facility]       HYDROcodone-acetaminophen (NORCO) 5-325 MG tablet Take 1 tablet by mouth every 4 hours as needed (Moderate to Severe Pain). 10 tablet 0     metroNIDAZOLE (METROGEL) 1 % gel        Omega-3 Fatty Acids (FISH OIL PO)        oxymetazoline (AFRIN NASAL SPRAY) 0.05 % nasal spray Use 2 sprays to each nare two times daily for up to 3 days. 15 mL 0     sodium chloride (OCEAN) 0.65 % nasal spray Spray 1-2 sprays in nostril every hour as needed for congestion (nasal dryness). Use in EACH nostril. 44 mL 1     tretinoin (RETIN-A) 0.1 % cream Apply QHS to face for rosacea       VITAMIN D PO Take 4,000 Units by mouth daily       No current facility-administered  medications for this visit.       Family History:  Family History   Problem Relation Age of Onset     Osteoarthritis Mother      Cerebrovascular Disease Mother 95     Osteoporosis Mother      Hip fracture Mother      Bladder Cancer Father 67     Breast Cancer Sister 53     Other - See Comments Sister         colon resetion, cause unclear     Sinusitis Sister         had sinus surgery     Heart Disease Brother 50        stents, was smoker     Hyperlipidemia Maternal Aunt      Cerebrovascular Disease Maternal Aunt      Hyperlipidemia Maternal Aunt      Thyroid Disease No family hx of      Thyroid Cancer No family hx of        Social History:  Social History     Tobacco Use     Smoking status: Never     Smokeless tobacco: Never   Substance Use Topics     Alcohol use: Yes     Comment: 0-1 per day           Pertinent Physical Exam:   There were no vitals taken for this visit.    Not in acute distress.  Sitting comfortably in chair.  No thyroid palpable.  No lymphadenopathy except small left mobile level 2 lymph node.  Normal lung sounds.  No wheezing or crackles.  Normal S1, S2,.  Normal rate and rhythm.  No murmurs or gallops.  No tremors.  No lower extremity edema.  Normal mood and affect.     Pertinent Labs and Imagin/04/2025  US head and neck  Right:     Level 2: 1.4 x 0.5 x 1.4 cm lymph node with fatty hilum, similar in  morphology to previous exam  Level 3: 0.8 x 0.5 x 0.9 cm oval lymph node not seen on previous exam  Level 4: 0.8 x 0.3 x 0.8 cm lymph node which previously measured 0.7 x  0.4 x 1.0 cm  Level 7: 0.8 x 0.6 x 0.7 cm oval node which previously measured 0.7 x  0.5 x 0.7 cm  1.3 x 0.7 x 0.8 cm round lymph node with large fatty hilum not  measured on previous exam     Left:  Level 2: 0.9 x 0.7 x 1.3 cm lymph node with fatty hilum reduced in  overall size from comparison  0.8 x 0.5 x 1.2 cm oval lymph node which previously measured 0.6 x 0.4  x 1.2 cm  Level 3: 1.0 x 0.5 x 1.8 cm lymph node which  previously measured 0.9 x  0.5 x 1.5 cm  Level 7: 1.3 x 0.5 x 0.9 cm lymph node with small fatty hilum,  previously 1.2 x 0.5 x 0.8 cm       Discussed with Attending  Phoenix Polo  Endocrine Fellow  Pager # [phone]    Again, thank you for allowing me to participate in the care of your patient.      Sincerely,    Olga Murry MD

## 2025-06-04 NOTE — PROGRESS NOTES
Attending tie-in statement: Patient seen and examined by me, discussed with fellow whose note I have reviewed and with which I agree.   Key elements and diagnostic points include the following:    Multiple thyroid nodules, all subcm with perithyroidal adenopathy She has history of radiation exposure as a child which increases her risk of thyroid nodules and malignancy.   Next US in one year and see me afterwards    perithyroidal adenopathy- benign cytology on bilateral lateral neck LNs 2020.    Right level 3 #1 and right level 7 # 2 benign cytology 1/2025     Low bone density. Family history of hip fracture in her mother.     Alendronate [id number];  Dr Almanzar is managing it.     History of  corticosteroid injections for her back - this is a risk for the bone - as above.      Post menopausal    History of radiation exposure presenting health risks - Shoe store fluroscope exposure as a child approx age 4.  As above.      The Longitudinal plan of care for nodular thyroid in the context of childhood radiation exposure was addressed during this visit. Due to added complexity of care, we will continue to support Virginia , and the subsequent management of this condition(s) and with the ongoing continuity of care of this condition.      25__ minutes spent on the date of the encounter doing chart review, history and exam, documentation and further activities as noted above.    Olga Murry MD    I last saw her 11/2024.  Since then, she had FNAB of 2 cervical LNs, both with benign cytology.     Thyroid nodule was lst seen on an MRI  Since then, she has had thyorid US x 5.  We performed FNAB on some LNs in 2020, with benign cytology.   Ashtyn was exposed to the shoe store fluoroscope at [facility] around age 4 years.     She  took alendronate  2/2020 through 4/2025, managed by Dr Almanzar .    We have the following labs:   10/6/16 HgbA1c 5.6%  12/11/19 TSH 2.58, creatinine 0.7, Ca 9.4, glucose 106, cholesterol 262,  "  2/26/2020 calcitonin < 2, TPO < 10, HgbA1c 5.7%  12/14/2020 glucose 95, Ca 9, creatinine 0.59  4/24/23 Ca 9.9, creatinine 0.6, glucose 103  5/4/23 TSH 1.19, free T4 1.29  1/23/25 FNAB right level 3 # 1 and right level 7 # 2 polymorphous populatioin of lymphocytes; Flow cytometry on right level 3 # 1 polytypic B cells .    I have reviewed images on pACS  6/14/24 DXA lowest T-score -1.6 left femoral neck ; Bone gain since 2022 and 2019.   11/4/24 thyroid and neck US compared with 11/21/23 , 11/22/2021,  9/10/2020,  12/19-- as read by me again today   Right # 1 0.7 x 0.5 x 1 was 0.8 x 0.4 x 1.1 (0.18 CM3)anterior isthmus was right # 2 0.6 x 0.4 x 0.9  (0.11 CM3);  0.7 x 0.5 x 0.8 (0.14 CM3; 2019)  Left # 2  0.9 x 0.7 x 1.2 cm was 0.8 x 0.6 x 1.2 cm (0.29 CM3) solid hypoechoic anterior was 0.7 x 0.6 x 0.9 cm mixed (0.19 CM3);  0.7 x 0.7 x 0.7;  0.7 x 0.6 x 0.7 cm (0.15 CM3) )  Left # 3 0.7 x 0.3 x 0.9 cm was 0.8 x 0.4 x 0.8 was ?  left # 5  0.6 x 0.3 x 0.6   Right level 3 # 1 0.8 x 0.5 x 0.9 cm new?  This looks a lot like right level 4 # 2 from 2020 FNAB 1/23/25  polymorphous lymphocytes  Right level 4# 1 0.3 x 0.8 x 0.7 cm was ? Right level 4 # 1 0.4 x 0.7 x 1 was  0.4 x 0.5 x was  0.9 x 0.5 x 0.7 cm FNAB 11/23/2020 benign    Right level 7 # 1  0.8 x 0.6 x 0.7 cm was 0.7 x 0.5 x 0.7 cm was 1.0 x 0.6 x 1.1 cm;  1.3 x 0.7 x 1.1 cm  Right level 7 # 2  1.3 x 0.7 x 0.8 cm FNAB 1/23/25  polymorphous lymphocytes  Left level 3 1 x 0.5 x 1.8 cm was 0.9 x 0.5 x 1.5 cm was 0.9 x 0.4 x 1.7 cm;    0.9 x 0.5 x 2 cm ; 0.8 x 0.4 x 1.7 cm)- FNAB 11/23/20- benign   Left level 7 # 1  1.3 x 0.5 x 0.9 cm was 1.2 x 0.5 x 0.8 cm was  1.2 x 0.4 x 1.2; 1.1 x 0.6 x 0.9 cm      REVIEW OF SYSTEMS  Weight concern , eating low carb diet  Occasional difficulty swallowing   Completed 5 year course of alendronate     Busy with grandchildren;     Physical Exam  GENERAL no mask  /84   Pulse 103   Ht 1.676 m (5' 6\")   Wt 82.1 kg (181 lb)   " SpO2 98%   BMI 29.21 kg/m    SKIN: normal color, temperature, texture without hirsutism, alopecia   HEENT: PER, no scleral icterus, eyelid retraction, stare, lid lag, proptosis or conjunctival injection.  .    NECK: supple.  No visible or palpable neck masses, cervical adenopathy, or goiter.   LUNGS: clear to auscultation bilaterally.   CARDIAC: RRR, S1, S2 without murmurs, rubs or gallops.    BACK: normal spinal contour.    NEURO: Alert, responds appropriately to questions,  moves all extremities, DTRs 2/4,  no tremor of the outstretched hand    DATA REVIEW       Fine Needle Aspirate Lymph Node(s), Cervical, Right: [id number]  Order: [id number]  Collected 1/23/2025  9:50 AM       Status: Final result       Visible to patient: Yes (not seen)       Dx: Personal history of irradiation, pres...    0 Result Notes      Component    Final Diagnosis   A. LYMPH NODE(S), CERVICAL, RIGHT, LEVEL 3 #1, FINE NEEDLE ASPIRATE:  Interpretation -   - Polymorphous population of lymphocytes present, see comment  - No evidence of epithelial malignancy     Adequacy: Satisfactory for evaluation     B. LYMPH NODE(S), CERVICAL, RIGHT, LEVEL 7 #2, FINE NEEDLE ASPIRATE:  Interpretation -   - Polymorphous population of lymphocytes present, see comment  - No evidence of epithelial malignancy     Adequacy: Satisfactory for evaluation   Electronically signed by Charlie Reese MD on 1/24/2025 at  2:11 PM   Comment    Please correlate with concurrent flow cytometry case [id number].   Clinical Information    74F with cervical lymphadenopathy   Rapid Onsite Evaluation    FNA Performance:   Fine needle aspiration was not performed by [facility] staff.     Aspirate immediate study/adequacy:  ZACH KELLER JIMMIE III, MD, attest that I immediately examined smears while the procedure was underway and determined or confirmed the adequacy of the specimens via telepathology.     It is of note that the final assessment and report may be  performed and signed by a different pathologist.     Onsite adequacy/interpretation:  A: Adequate  B: Adequate      Gross Description    A(A). Lymph Node(s), Cervical, Right, level 3 #1:A. Lymph Node(s), Cervical, Right, level 3 #1, Fine Needle Aspirate:  Received are 2 fixed slides, processed for Pap stain, and 3 air dried slides, processed for Diff Quik stain.  RPMI sent to [facility]     B(B). Lymph Node(s), Cervical, Right, level 7 #2:B. Lymph Node(s), Cervical, Right, level 7 #2, Fine Needle Aspirate:  Received are 2 fixed slides, processed for Pap stain, and 3 air dried slides, processed for Diff Quik stain.  RPMI held.    Microscopic Description       Case was reviewed by the following:  Pathology Fellow: Olga Duenas DO  Pathology Fellow: Ladi Espana MD  Resident Pathologist: Myron Vaughn MD  A resident or fellow in a training program was involved in the initial review, preparation, and/or interpretation of this case.  I, as the senior physician, attest that I have personally reviewed all specimens and or slides, including the listed special stains, and used them with my medical judgement to determine the final diagnosis.               Performing Labs    The technical component of this testing was completed at [facility] and [facility].      Stain controls for all stains resulted within this report have been reviewed and show appropriate reactivity.    Resulting Agency [facility]             Flow Cytometry: [id number]  Order: [id number]  Collected 1/23/2025 10:00 AM       Status: Final result       Visible to patient: No (scheduled for 1/31/2025 11:49 AM)    Specimen Information: Lymph Node(s), Cervical, Right; Fine Needle Aspiration   0 Result Notes      Component    Case Report   Flow Cytometry Report                             Case: [id number]                                   Authorizing Provider:  Olga Murry MD     Collected:            01/23/2025 10:00 AM           Ordering Location:     [facility]  Received:            01/23/2025 10:40 AM                                  [facility]                                                         Pathologist:           Arline Dee MD                                                         Specimen:    Lymph Node(s), Cervical, Right, Level 3 #1                                                Flow Interpretation   A. Fine Needle Aspiration, Lymph Node(s), Cervical, Right:  -Polytypic B cells  -See comment       Electronically signed by Arline Dee MD on 1/24/2025 at 11:49 AM   Comment    There is no immunophenotypic evidence of B-cell non-Hodgkin lymphoma. Hodgkin lymphoma cannot be excluded by flow cytometry. Neoplastic cells, including large cells, may not survive specimen processing. The total number of cells in this sample is low, limiting the number of antibodies that can be analyzed and limiting the interpretation. Only B-cell antigens were evaluated. This sample may not be representative. Final interpretation requires correlation with morphologic and clinical features   Flow Phenotypic Data    Unless otherwise indicated, percentages reported below are based on the total number of CD45 positive viable leukocytes. If applicable, percentage of plasma cells is from total viable nucleated cells.        7% polytypic B cells      Flow Processing Information    Multi-color flow analysis is performed for the following markers: CD5, CD10, CD19, CD20, CD34, CD38, CD45,and kappa and lambda immunoglobulin light chains. Cells are gated to isolate populations (CD45 versus side scatter and forward scatter versus side scatter), to exclude debris (forward scatter versus side scatter) and to exclude cell doublets (forward scatter height versus forward scatter width and side scatter height versus side scatter width). Forward scatter varies with cell size. Side scatter varies  with the amount of cytoplasmic granules. Intensity for CD45 usually increases as hematolymphoid cells mature.    Clinical Information    74 yrs old female with cervical adenopathy       FDA Disclaimer    This test was developed and its performance characteristics determined by the [facility]. It has not been cleared or approved by the US Food and Drug Administration.  FDA does not require this test to go through premarket FDA review. This test is used for clinical purposes and should not be regarded as investigational or for research. This laboratory is certified under the Clinical Laboratory Improvement Amendments (CLIA) as qualified to perform high complexity clinical laboratory testing.   Performing Labs    The technical component of this testing was completed at [facility].     Stain controls for all stains resulted within this report have been reviewed and show appropriate reactivity.   Resulting Agency [facility]             Specimen Collected: 01/23/25 10:00 AM Last Resulted: 01/24/25 11:49 AM      Latest Reference Range & Units 04/29/25 09:24   Sodium 135 - 145 mmol/L 141   Potassium 3.4 - 5.3 mmol/L 4.5   Chloride 98 - 107 mmol/L 103   Carbon Dioxide (CO2) 22 - 29 mmol/L 26   Urea Nitrogen 8.0 - 23.0 mg/dL 18.6   Creatinine 0.51 - 0.95 mg/dL 0.66   GFR Estimate >60 mL/min/1.73m2 90   Calcium 8.8 - 10.4 mg/dL 9.8   Anion Gap 7 - 15 mmol/L 12   Albumin 3.5 - 5.2 g/dL 4.6   Protein Total 6.4 - 8.3 g/dL 7.3   Alkaline Phosphatase 40 - 150 U/L 54   ALT 0 - 50 U/L 22   AST 0 - 45 U/L 24   Bilirubin Total <=1.2 mg/dL 0.4   Cholesterol <200 mg/dL 198   Patient Fasting?  Yes  Yes   Glucose 70 - 99 mg/dL 100 (H)   HDL Cholesterol >=50 mg/dL 74   (H): Data is abnormally high

## 2025-06-07 DIAGNOSIS — M85.88 OSTEOPENIA OF LUMBAR SPINE: ICD-10-CM

## 2025-06-09 PROBLEM — R59.0 CERVICAL ADENOPATHY: Status: ACTIVE | Noted: 2020-10-26

## 2025-06-09 RX ORDER — ALENDRONATE SODIUM 70 MG/1
70 TABLET ORAL
Qty: 12 TABLET | Refills: 0 | OUTPATIENT
Start: 2025-06-09

## 2025-06-10 ENCOUNTER — TRANSFERRED RECORDS (OUTPATIENT)
Dept: HEALTH INFORMATION MANAGEMENT | Facility: CLINIC | Age: 75
End: 2025-06-10
Payer: MEDICARE

## 2025-06-24 ENCOUNTER — TRANSFERRED RECORDS (OUTPATIENT)
Dept: HEALTH INFORMATION MANAGEMENT | Facility: CLINIC | Age: 75
End: 2025-06-24
Payer: MEDICARE

## 2025-06-25 ENCOUNTER — ANCILLARY PROCEDURE (OUTPATIENT)
Dept: MAMMOGRAPHY | Facility: CLINIC | Age: 75
End: 2025-06-25
Attending: INTERNAL MEDICINE
Payer: MEDICARE

## 2025-06-25 DIAGNOSIS — Z12.31 VISIT FOR SCREENING MAMMOGRAM: ICD-10-CM

## 2025-06-25 PROCEDURE — 77063 BREAST TOMOSYNTHESIS BI: CPT | Mod: GC | Performed by: RADIOLOGY

## 2025-06-25 PROCEDURE — 77067 SCR MAMMO BI INCL CAD: CPT | Mod: GC | Performed by: RADIOLOGY

## 2025-07-18 ENCOUNTER — ANCILLARY PROCEDURE (OUTPATIENT)
Dept: GENERAL RADIOLOGY | Facility: CLINIC | Age: 75
End: 2025-07-18
Payer: MEDICARE

## 2025-07-18 ENCOUNTER — OFFICE VISIT (OUTPATIENT)
Dept: URGENT CARE | Facility: URGENT CARE | Age: 75
End: 2025-07-18
Payer: MEDICARE

## 2025-07-18 VITALS
OXYGEN SATURATION: 98 % | BODY MASS INDEX: 28.56 KG/M2 | HEART RATE: 102 BPM | DIASTOLIC BLOOD PRESSURE: 87 MMHG | HEIGHT: 67 IN | TEMPERATURE: 98.5 F | SYSTOLIC BLOOD PRESSURE: 135 MMHG

## 2025-07-18 DIAGNOSIS — M25.562 ACUTE PAIN OF LEFT KNEE: Primary | ICD-10-CM

## 2025-07-18 PROCEDURE — 73560 X-RAY EXAM OF KNEE 1 OR 2: CPT | Mod: TC | Performed by: RADIOLOGY

## 2025-07-18 PROCEDURE — 99213 OFFICE O/P EST LOW 20 MIN: CPT

## 2025-07-18 PROCEDURE — 3079F DIAST BP 80-89 MM HG: CPT

## 2025-07-18 PROCEDURE — 3075F SYST BP GE 130 - 139MM HG: CPT

## 2025-07-18 NOTE — PROGRESS NOTES
Assessment & Plan:      Problem List Items Addressed This Visit    None  Visit Diagnoses         Acute pain of left knee    -  Primary    Relevant Orders    XR Knee Left 1/2 Views (Completed)    Ankle/Knee Bracing Supplies Order Hinged Knee Brace; Left          Medical Decision Making    Acute left knee pain   Patient presenting to urgent care after being seen by her physical therapy team at [facility].  Had a sudden increase in pain in the left knee.  Overall reassuring knee x-ray and patient is scheduled for left knee arthroplasty next month.  Discussed plan to wear knee brace until follow-up with her surgeon.  Recommend symptomatic treatment as per below.  Through shared decision making opted to defer steroids to her orthopedics team.  Patient demonstrated understanding of care plan as per below and return precautions.  Patient discharged in stable condition, all questions answered.  - Knee brace wear until follow-up with orthopedic surgeon   - Tylenol, voltaren gel, and ibuprofen for pain, hold Celebrex while on ibuprofen  - Would defer steroids to orthopedics team or until we see if other pain meds aren't effective  - Return if symptoms worsen    Patient verbalizes understanding of the treatment regimen and will follow up as needed for recheck and evaluation if symptoms worsen or do not improve. Patient states understanding and agreement with the plan.    Montana Díaz MD  Internal Medicine-Pediatrics  [facility]     Subjective:      Yanely Jon is a 75 year old female here for evaluation of left knee pain.    Virginia is a delightful 75-year-old who I had the pleasure of seeing today who injured her knee this morning.  Patient has significant past medical history of right total knee arthroplasty 3 to 4 years ago and is scheduled to have total left knee arthroplasty in August of this year.  She was with her physical therapist this morning when she was sitting and  about 2 return to go speak with a staff member when she had a sharp pain develop in her medial left knee.  She did not hear anything snap, her leg did not give out, and she has not noticed any swelling.  For pain she is on celecoxib daily and as needed Voltaren gel.  Upon the recommendation of her physical therapist she came to be seen for evaluation and x-ray.  She is otherwise in her normal state of health she is active and walks vigorously daily.  She has no other complaints today.     The following portions of the patient's history were reviewed and updated as appropriate: allergies, current medications, and problem list.     Review of Systems  Pertinent items are noted in HPI.  All other systems are negative.    Allergies  Allergies   Allergen Reactions    Fentanyl Other (See Comments)     May have caused pt. To pass out after cataract     Methohexital Other (See Comments)     May have caused pt. To pass out after cataract     Midazolam Other (See Comments)     May have caused pt. To pass out after cataract surgery    Seasonal Allergies        Family History   Problem Relation Age of Onset    Osteoarthritis Mother     Cerebrovascular Disease Mother 95    Osteoporosis Mother     Hip fracture Mother     Bladder Cancer Father 67    Breast Cancer Sister 53    Other - See Comments Sister         colon resetion, cause unclear    Sinusitis Sister         had sinus surgery    Heart Disease Brother 50        stents, was smoker    Hyperlipidemia Maternal Aunt     Cerebrovascular Disease Maternal Aunt     Hyperlipidemia Maternal Aunt     Thyroid Disease No family hx of     Thyroid Cancer No family hx of        Social History     Tobacco Use    Smoking status: Never    Smokeless tobacco: Never   Substance Use Topics    Alcohol use: Yes     Comment: 0-1 per day        Objective:      /87 (BP Location: Right arm, Patient Position: Sitting, Cuff Size: Adult Large)   Pulse 102   Temp 98.5  F (36.9  C) (Tympanic)   Ht  "1.695 m (5' 6.75\")   SpO2 98%   BMI 28.56 kg/m    General appearance - alert, well appearing, and in no distress  Mental status - normal mood, behavior, speech, dress, motor activity, and thought processes  Chest - breathing comfortably on room air  Heart - peers well-perfused  Musculoskeletal - right knee without any abnormalities, left knee with tenderness during valgus and varus maneuvers, no appreciable joint laxity, mild medial joint line tenderness.  Extremities - no peripheral edema noted  Skin - normal coloration and turgor, no rashes, no suspicious skin lesions noted     Lab & Imaging Results  EXAM: XR KNEE LEFT 1/2 VIEWS  LOCATION: [facility]  DATE: 7/18/2025     INDICATION: Acute onset of knee pain during physical therapy.  COMPARISON: None.                                                                   IMPRESSION: No fracture or effusion. Moderate degenerative changes in the medial and lateral compartments.    I personally reviewed these results and discussed findings with the patient.    "

## 2025-07-18 NOTE — PATIENT INSTRUCTIONS
- Knee brace wear until follow-up with orthopedic surgeon   - Tylenol, voltaren gel, and ibuprofen for pain, hold Celebrex while on ibuprofen  - Would defer steroids to orthopedics team or until we see if other pain meds aren't effective  - Return if symptoms worsen

## 2025-07-24 NOTE — PATIENT INSTRUCTIONS
Tylenol ES 500mg   2 tablets 3x per day---do not exceed 3000mg in 24 h    Miralax powder, 1 capful any day you take a narcotic for pain    How to Take Your Medication Before Surgery  Preoperative Medication Instructions   Antiplatelet or Anticoagulation Medication Instructions   - We reviewed the medication list and the patient is not on an antiplatelet or anticoagulation medications.    Additional Medication Instructions   - Statins (atorvastatin, simvastatin, pravastatin) : Continue taking on the day of surgery.    - meloxicam (Mobic): DO NOT TAKE 10 days before surgery.    - Cetirizine --Do not take on the morning of surgery      How to Take Your Medication Before Surgery  Hold any vitamins, herbs, supplements, aspirin, ibuprofen, naproxen for 10 days prior to surgery.   Do not take any medications on the morning of your surgery  You may take acetaminophen (Tylenol) in the 10 days prior to surgery.          Patient Education   Preparing for Your Surgery  For Adults  Getting started  In most cases, a nurse will call to review your health history and instructions. They will give you an arrival time based on your scheduled surgery time. Please be ready to share:  Your doctor's clinic name and phone number  Your medical, surgical, and anesthesia history  A list of allergies and sensitivities  A list of medicines, including herbal treatments and over-the-counter drugs  Whether the patient has a legal guardian (ask how to send us the papers in advance)  Note: You may not receive a call if you were seen at our PAC (Preoperative Assessment Center).  Please tell us if you're pregnant--or if there's any chance you might be pregnant. Some surgeries may injure a fetus (unborn baby), so they require a pregnancy test. Surgeries that are safe for a fetus don't always need a test, and you can choose whether to have one.   Preparing for surgery  Within 10 to 30 days of surgery: Have a pre-op exam (sometimes called an H&P, or  History and Physical). This can be done at a clinic or pre-operative center.  If you're having a , you may not need this exam. Talk to your care team.  At your pre-op exam, talk to your care team about all medicines you take. (This includes CBD oil and any drugs, such as THC, marijuana, and other forms of cannabis.) If you need to stop any medicine before surgery, ask when to start taking it again.  This is for your safety. Many medicines and drugs can make you bleed too much during surgery. Some change how well surgery (anesthesia) drugs work.  Call your insurance company to let them know you're having surgery. (If you don't have insurance, call [phone].)  Call your clinic if there's any change in your health. This includes a scrape or scratch near the surgery site, or any signs of a cold (sore throat, runny nose, cough, rash, fever).  Eating and drinking guidelines  For your safety: Unless your surgeon tells you otherwise, follow the guidelines below.  Eat and drink as normal until 8 hours before you arrive for surgery. After that, no food or milk. You can spit out gum when you arrive.  Drink clear liquids until 2 hours before you arrive. These are liquids you can see through, like water, Gatorade, and Propel Water. They also include plain black coffee and tea (no cream or milk).  No alcohol for 24 hours before you arrive. The night before surgery, stop any drinks that contain THC.  If your care team tells you to take medicine on the morning of surgery, it's okay to take it with a sip of water. No other medicines or drugs are allowed (including CBD oil)--follow your care team's instructions.  If you have questions the day of surgery, call your hospital or surgery center.   Preventing infection  Shower or bathe the night before and the morning of surgery. Follow the instructions your clinic gave you. (If no instructions, use regular soap.)  Don't shave or clip hair near your surgery site. We'll remove  the hair if needed.  Don't smoke or vape the morning of surgery. No chewing tobacco for 6 hours before you arrive. A nicotine patch is okay. You may spit out nicotine gum when you arrive.  For some surgeries, the surgeon will tell you to fully quit smoking and nicotine.  We will make every effort to keep you safe from infection. We will:  Clean our hands often with soap and water (or an alcohol-based hand rub).  Clean the skin at your surgery site with a special soap that kills germs.  Give you a special gown to keep you warm. (Cold raises the risk of infection.)  Wear hair covers, masks, gowns, and gloves during surgery.  Give antibiotic medicine, if prescribed. Not all surgeries need this medicine.  What to bring on the day of surgery  Photo ID and insurance card  Copy of your health care directive, if you have one  Glasses and hearing aids (bring cases)  You can't wear contacts during surgery  Inhaler and eye drops, if you use them (tell us about these when you arrive)  CPAP machine or breathing device, if you use them  A few personal items, if spending the night  If you have . . .  A pacemaker, ICD (cardiac defibrillator), or other implant: Bring the ID card.  An implanted stimulator: Bring the remote control.  A legal guardian: Bring a copy of the certified (court-stamped) guardianship papers.  Please remove any jewelry, including body piercings. Leave jewelry and other valuables at home.  If you're going home the day of surgery  You must have a support person drive you home. They should stay with you overnight, and they may need to help with your self-care.  If you don't have a support person, please tells us as soon as possible. We can help.  After surgery  If it's hard to control your pain or you need more pain medicine, please call your surgeon's office.  Questions?   If you have any questions for your care team, list them here:    ____________________________________________________________________________________________________________________________________________________________________________________________________________________________________________________________  For informational purposes only. Not to replace the advice of your health care provider. Copyright   2003, 2019 [facility]. All rights reserved. Clinically reviewed by Finn Martinez MD. [employer] [id number] - REV 02/25.

## 2025-07-24 NOTE — PROGRESS NOTES
Preoperative Evaluation  [facility] [address], [address]  [facility] [address]  Phone: [phone]  Fax: [phone]  Primary Provider: Sherlyn Almanzar MD  Pre-op Performing Provider: Sherlyn Almanzar MD  Jul 29, 2025 7/24/2025   Surgical Information   What procedure is being done?  Knee replacement surgery   Facility or Hospital where procedure/surgery will be performed: [facility]   Who is doing the procedure / surgery? Dr Judie Mosher   Date of surgery / procedure: August 21, 2025     Time of surgery / procedure: TBD   Where do you plan to recover after surgery? at home with family     Fax number for surgical facility: [phone]    Assessment & Plan     The proposed surgical procedure is considered INTERMEDIATE risk.      (Z01.818) Preop general physical exam  (primary encounter diagnosis)  (M17.12) Primary osteoarthritis of left knee  Comment: elective Left total knee arthroplasty  Plan: CBC with platelets, Basic metabolic panel        No contraindication to procedure, proceed as planned    (E78.5) Hyperlipidemia LDL goal <100  Comment: fasting, atorvastatin dose recently increased from 10mg to 20mg  Plan: Lipid Profile, EKG 12-lead complete w/read -         Clinics            (Z13.1) Screening for diabetes mellitus  Comment: some weight gain, A1c just at lower prediabetic range, she reports eating low carb diet  Lab Results   Component Value Date    A1C 5.7 07/29/2025    A1C 5.6 12/14/2020    A1C 5.7 02/26/2020   Plan: Hemoglobin A1c  No medication recommended at this time. Will send info on Diabetes [employer]. Recheck in 3 mos      - No identified additional risk factors other than previously addressed      How to Take Your Medication Before Surgery  Preoperative Medication Instructions   Antiplatelet or Anticoagulation Medication Instructions   - We reviewed the medication list and the patient is not on an antiplatelet or  anticoagulation medications.    Additional Medication Instructions   - Statins (atorvastatin, simvastatin, pravastatin) : Continue taking on the day of surgery.    - meloxicam (Mobic): DO NOT TAKE 10 days before surgery.    - Cetirizine --Do not take on the morning of surgery      How to Take Your Medication Before Surgery  Hold any vitamins, herbs, supplements, aspirin, ibuprofen, naproxen for 10 days prior to surgery.   Do not take any medications on the morning of your surgery  You may take acetaminophen (Tylenol) in the 10 days prior to surgery.     Recommendation  Approval given to proceed with proposed procedure, without further diagnostic evaluation.    Sherlyn Almanzar MD  Internal Medicine/Pediatrics          Franklyn Chamorro is a 75 year old, presenting for the following:  Pre-Op Exam        HPI: Yanely Jon is a 75 year old female with history of advanced osteoarthritis of her left knee. She has tried PT and cortisone without lasting effects. She presents for preoperative assessment today. She is to undergo elective LEFT total knee arthroplasty.         7/24/2025   Pre-Op Questionnaire   Have you ever had a heart attack or stroke? No   Have you ever had surgery on your heart or blood vessels, such as a stent placement, a coronary artery bypass, or surgery on an artery in your head, neck, heart, or legs? No   Do you have chest pain with activity? No   Do you have a history of heart failure? No   Do you currently have a cold, bronchitis or symptoms of other infection? No   Do you have a cough, shortness of breath, or wheezing? No   Do you or anyone in your family have previous history of blood clots? No   Do you or does anyone in your family have a serious bleeding problem such as prolonged bleeding following surgeries or cuts? No   Have you ever had problems with anemia or been told to take iron pills? No   Have you had any abnormal blood loss such as black, tarry or bloody stools, or abnormal  vaginal bleeding? No   Have you ever had a blood transfusion? No   Are you willing to have a blood transfusion if it is medically needed before, during, or after your surgery? Yes   Have you or any of your relatives ever had problems with anesthesia? No   Do you have sleep apnea, excessive snoring or daytime drowsiness? No   Do you have any artifical heart valves or other implanted medical devices like a pacemaker, defibrillator, or continuous glucose monitor? No   Do you have artificial joints? (!) YES   Are you allergic to latex? (!) YES     Advance Care Planning    Document on file is a Health Care Directive or POLST.    Preoperative Review of    reviewed - controlled substances prescribed by other outside provider(s).         Hyperlipidemia  Atorvastatin 20mg daily, increased from 10mg after last lipid profile, see below  LDL goal <100  No hx of ASCVD or stroke  Nonsmoker, no HTN or DM  Recent Labs   Lab Test 04/29/25  0924 04/26/24  0927 12/14/21  1525 12/14/20  1356 02/10/20  1431   CHOL 198 183   < > 193.0 174.0   HDL 74 73   < > 80.0 68.0   * 98   < > 104.0 95.0   TRIG 40 58   < > 45.0 57.0   CHOLHDLRATIO  --   --   --  2.4 2.6    < > = values in this interval not displayed.     Seasonal allergies  Takes Cetirizine 10mg daily  Allergies to trees  Flonase nasal spray   Discussed holding medication on morning of surgery    Patient Active Problem List    Diagnosis Date Noted    Left maxillary sinusitis 03/24/2025     Priority: Medium    Muscle cramps 05/03/2023     Priority: Medium    Elevated blood pressure reading 04/24/2023     Priority: Medium    Other osteoarthritis involving multiple joints 04/24/2023     Priority: Medium    Hyperlipidemia LDL goal <100 04/24/2023     Priority: Medium    Cervical adenopathy 10/26/2020     Priority: Medium    Personal history of irradiation, presenting hazards to health 02/26/2020     Priority: Medium    Osteopenia of lumbar spine 09/04/2018     Priority:  Medium     DEXA 5/2022  Results   Lumbar spine L1-L2   T-score -2.0   Left Femur neck         T-score -1.5   Left Total hip               T-score -1.4  Right Femur neck       T-score -1.7  Right Total hip             T-score -1.3     DEXA 12/2019  Spine T -2.4  Left Femoral neck T -1.9  Start alendronate February 27, 2020 , continue x 5 yrs      Thyroid nodule 10/25/2017     Priority: Medium    Primary osteoarthritis of knee 10/25/2017     Priority: Medium     knees      Seasonal allergic rhinitis 10/25/2017     Priority: Medium    Rosacea 10/25/2017     Priority: Medium    Cervicalgia 08/19/2016     Priority: Medium     S/p cortisone injections      Varicose veins of both lower extremities with inflammation 01/01/2015     Priority: Medium     Lower extremity venous varicosities, intermittently symptomatic, status post evaluation with Dr. Alonso in General Surgery in the summer of 2015.        Past Medical History:   Diagnosis Date    Acrochordon     Atypical chest pain 1999    Normal stress test in 1999, performed for atypical chest pain    Benign nevus of skin     Numerous benign skin nevi    Biceps tendonitis     Bilateral bunions     Breast implant leak     Breast implants complicated by a leak. She then underwent replacement and repair.     Chronic fatigue     H/O urinary incontinence     Mild occasional female urinary incontinence which seems to be both urge and stress related.    History of bilateral cataract extraction     Hx of tubal ligation     Left hip pain     Occasional left hip pain due to osteoarthritis and/or trochanteric bursitis.    Lipoma of back 2014    Chronic lipoma of the left back, status post-surgical resection in January 2014    Low back pain     Occasional low back pain with radicular symptoms into the legs.    Osteoarthritis of knees, bilateral     Osteoarthritis of left hip     Osteopenia 09/13/2013    Postmenopausal status     Prediabetes 02/2020    Pubic bone pain     Musculoskeletal  pubic bone pain due to a strain.    Rosacea     Seborrheic keratoses     Skin lesions     Atypical melanocytic lesions, status post multiple biopsies by Dr. Rivera in July of 2015. These biposies showed a junctional nevus with moderate atypia, which is also a dysplastic nevus and the final one was also a compound nevus, with mild atypia which is also a dysplastic nevus.  She was contacted by Dr. Rivera and was told to follow up with her again in July 2016, for re-evaluation of her skin.    Skin tag     Solar elastosis     Solar lentiginosis     Sternal fracture 2015    Mid-sternal fracture after blunt trauma while falling while running up stepsin May 2015. She may have also had some rib fractures as well. These healed with conservative management.    Symptomatic varicose veins of both lower extremities 2015    Lower extremity venous varicosities, intermittently symptomatic, status post evaluation with Dr. Alonso in General Surgery in the summer of 2015.    Symptomatic varicose veins of both lower extremities     Status post injection and compression sclerotherapy    Syncope 2018    Urticaria     Questionable exercise-induced urticaria     Past Surgical History:   Procedure Laterality Date    APPENDECTOMY      BUNIONECTOMY Bilateral 10/2017    CATARACT IOL, RT/LT Right     COLONOSCOPY  2014    repeat in 10 yr    COLONOSCOPY N/A 10/3/2024    Procedure: Colonoscopy;  Surgeon: Angely Forman MD;  [place] [facility] [place]    DENTAL SURGERY      MAMMOPLASTY AUGMENTATION Bilateral 1972    OPTICAL TRACKING SYSTEM ENDOSCOPIC SINUS SURGERY Left 5/2/2025    Procedure: left maxillary antrostomy;  Surgeon: Regine Reyes MD;  [place] [facility] [place]    RHYTIDECTOMY (FACELIFT)  01/23/2014    neck    SCLEROTHERAPY  10/03/2014    varicose veins    SURGICAL PATHOLOGY EXAM  01/13/2014    lower back    TONSILLECTOMY      TOTAL KNEE ARTHROPLASTY Right 12/2021    TUBAL LIGATION       Current Outpatient Medications   Medication Sig Dispense Refill     atorvastatin (LIPITOR) 20 MG tablet Take 1 tablet (20 mg) by mouth daily. Note change in dose 90 tablet 3    calcium carbonate 600 mg-vitamin D 400 units (CALTRATE) 600-400 MG-UNIT per tablet Take 1 tablet by mouth 2 times daily 180 tablet 3    celecoxib (CELEBREX) 200 MG capsule Take 1 capsule (200 mg) by mouth daily. 90 capsule 3    cetirizine-pseudoePHEDrine ER (ZYRTEC-D) 5-120 MG 12 hr tablet Take 1 tablet by mouth 2 times daily.      DULoxetine (CYMBALTA) 30 MG capsule Take 2 daily for pain, rx from [facility]      metroNIDAZOLE (METROGEL) 1 % gel       Omega-3 Fatty Acids (FISH OIL PO)       oxymetazoline (AFRIN NASAL SPRAY) 0.05 % nasal spray Use 2 sprays to each nare two times daily for up to 3 days. 15 mL 0    sodium chloride (OCEAN) 0.65 % nasal spray Spray 1-2 sprays in nostril every hour as needed for congestion (nasal dryness). Use in EACH nostril. 44 mL 1    tretinoin (RETIN-A) 0.1 % cream Apply QHS to face for rosacea      VITAMIN D PO Take 4,000 Units by mouth daily      cetirizine (ZYRTEC) 10 MG tablet Take 1 tablet (10 mg) by mouth daily (Patient not taking: Reported on 7/29/2025) 90 tablet 3       Allergies   Allergen Reactions    Fentanyl Other (See Comments)     May have caused pt. To pass out after cataract     Methohexital Other (See Comments)     May have caused pt. To pass out after cataract     Midazolam Other (See Comments)     May have caused pt. To pass out after cataract surgery    Seasonal Allergies         Social History     Tobacco Use    Smoking status: Never    Smokeless tobacco: Never   Substance Use Topics    Alcohol use: Yes     Comment: 0-1 per day     Family History   Problem Relation Age of Onset    Osteoarthritis Mother     Cerebrovascular Disease Mother 95    Osteoporosis Mother     Hip fracture Mother     Bladder Cancer Father 67    Breast Cancer Sister 53    Other - See Comments Sister         colon resetion, cause unclear    Sinusitis Sister         had sinus surgery    Heart  "Disease Brother 50        stents, was smoker    Hyperlipidemia Maternal Aunt     Cerebrovascular Disease Maternal Aunt     Hyperlipidemia Maternal Aunt     Thyroid Disease No family hx of     Thyroid Cancer No family hx of      History   Drug Use No        Review of Systems  Constitutional, neuro, ENT, endocrine, pulmonary, cardiac, gastrointestinal, genitourinary, musculoskeletal, integument and psychiatric systems are negative, except as otherwise noted.    Objective    /78 (BP Location: Left arm, Patient Position: Sitting, Cuff Size: Adult Regular)   Pulse 96   Temp 97.5  F (36.4  C) (Skin)   Resp 15   Ht 1.676 m (5' 6\")   Wt 80.3 kg (177 lb)   SpO2 96%   BMI 28.57 kg/m     Estimated body mass index is 28.57 kg/m  as calculated from the following:    Height as of this encounter: 1.676 m (5' 6\").    Weight as of this encounter: 80.3 kg (177 lb).  Physical Exam  GENERAL: alert and no distress  EYES: Eyes grossly normal to inspection, PERRL and conjunctivae and sclerae normal  HENT: ear canals and TM's normal, nose and mouth without ulcers or lesions  NECK: no adenopathy, no asymmetry, masses, or scars  RESP: lungs clear to auscultation  CV: regular rate and rhythm, normal S1 S2, no murmur  ABDOMEN: soft, nontender, bowel sounds normal  MS: no gross musculoskeletal defects noted  SKIN: no suspicious lesions or rashes  NEURO: Normal strength and tone, mentation intact and speech normal  PSYCH: mentation appears normal, affect normal/bright  EXT No edema, pulses full    Recent Labs   Lab Test 04/29/25  0924   HGB 14.5         POTASSIUM 4.5   CR 0.66        Diagnostics  Results for orders placed or performed in visit on 07/29/25   Lipid Profile     Status: Abnormal   Result Value Ref Range    Cholesterol 195 <200 mg/dL    Triglycerides 67 <150 mg/dL    Direct Measure HDL 73 >=50 mg/dL    LDL Cholesterol Calculated 109 (H) <100 mg/dL    Non HDL Cholesterol 122 <130 mg/dL    Patient Fasting > " 8hrs? Yes            CBC with platelets     Status: Abnormal   Result Value Ref Range    WBC Count 5.7 4.0 - 11.0 10e3/uL    RBC Count 4.11 3.80 - 5.20 10e6/uL    Hemoglobin 13.6 11.7 - 15.7 g/dL    Hematocrit 40.6 35.0 - 47.0 %    MCV 99 78 - 100 fL    MCH 33.1 (H) 26.5 - 33.0 pg    MCHC 33.5 31.5 - 36.5 g/dL    RDW 13.9 10.0 - 15.0 %    Platelet Count 219 150 - 450 10e3/uL   Basic metabolic panel     Status: Abnormal   Result Value Ref Range    Sodium 143 135 - 145 mmol/L    Potassium 4.4 3.4 - 5.3 mmol/L    Chloride 106 98 - 107 mmol/L    Carbon Dioxide (CO2) 27 22 - 29 mmol/L    Anion Gap 10 7 - 15 mmol/L    Urea Nitrogen 15.1 8.0 - 23.0 mg/dL    Creatinine 0.61 0.51 - 0.95 mg/dL    GFR Estimate >90 >60 mL/min/1.73m2    Calcium 9.3 8.8 - 10.4 mg/dL    Glucose 108 (H) 70 - 99 mg/dL    Patient Fasting > 8hrs? Yes    Hemoglobin A1c     Status: Abnormal   Result Value Ref Range    Estimated Average Glucose 117 (H) <117 mg/dL    Hemoglobin A1C 5.7 (H) 0.0 - 5.6 %       EK)  appears normal, Sinus Tachycardia,screen  rate 104. Normal axis, normal intervals, no acute ST/T changes c/w ischemia, no LVH by voltage criteria, unchanged from previous tracings.  Read by Sherlyn Almanzar MD  Internal Medicine/Pediatrics      Revised Cardiac Risk Index (RCRI)  The patient has the following serious cardiovascular risks for perioperative complications:   - No serious cardiac risks = 0 points     RCRI Interpretation: 0 points: Class I (very low risk - 0.4% complication rate)         Signed Electronically by: Sherlyn Almanzar MD  A copy of this evaluation report is provided to the requesting physician.

## 2025-07-29 ENCOUNTER — OFFICE VISIT (OUTPATIENT)
Dept: FAMILY MEDICINE | Facility: CLINIC | Age: 75
End: 2025-07-29
Payer: MEDICARE

## 2025-07-29 VITALS
HEART RATE: 96 BPM | DIASTOLIC BLOOD PRESSURE: 78 MMHG | RESPIRATION RATE: 15 BRPM | TEMPERATURE: 97.5 F | SYSTOLIC BLOOD PRESSURE: 129 MMHG | OXYGEN SATURATION: 96 % | HEIGHT: 66 IN | BODY MASS INDEX: 28.45 KG/M2 | WEIGHT: 177 LBS

## 2025-07-29 DIAGNOSIS — Z13.1 SCREENING FOR DIABETES MELLITUS: ICD-10-CM

## 2025-07-29 DIAGNOSIS — M17.12 PRIMARY OSTEOARTHRITIS OF LEFT KNEE: ICD-10-CM

## 2025-07-29 DIAGNOSIS — Z01.818 PREOP GENERAL PHYSICAL EXAM: Primary | ICD-10-CM

## 2025-07-29 DIAGNOSIS — E78.5 HYPERLIPIDEMIA LDL GOAL <100: ICD-10-CM

## 2025-07-29 LAB
ANION GAP SERPL CALCULATED.3IONS-SCNC: 10 MMOL/L (ref 7–15)
BUN SERPL-MCNC: 15.1 MG/DL (ref 8–23)
CALCIUM SERPL-MCNC: 9.3 MG/DL (ref 8.8–10.4)
CHLORIDE SERPL-SCNC: 106 MMOL/L (ref 98–107)
CHOLEST SERPL-MCNC: 195 MG/DL
CREAT SERPL-MCNC: 0.61 MG/DL (ref 0.51–0.95)
EGFRCR SERPLBLD CKD-EPI 2021: >90 ML/MIN/1.73M2
ERYTHROCYTE [DISTWIDTH] IN BLOOD BY AUTOMATED COUNT: 13.9 % (ref 10–15)
EST. AVERAGE GLUCOSE BLD GHB EST-MCNC: 117 MG/DL
FASTING STATUS PATIENT QL REPORTED: YES
FASTING STATUS PATIENT QL REPORTED: YES
GLUCOSE SERPL-MCNC: 108 MG/DL (ref 70–99)
HBA1C MFR BLD: 5.7 % (ref 0–5.6)
HCO3 SERPL-SCNC: 27 MMOL/L (ref 22–29)
HCT VFR BLD AUTO: 40.6 % (ref 35–47)
HDLC SERPL-MCNC: 73 MG/DL
HGB BLD-MCNC: 13.6 G/DL (ref 11.7–15.7)
LDLC SERPL CALC-MCNC: 109 MG/DL
MCH RBC QN AUTO: 33.1 PG (ref 26.5–33)
MCHC RBC AUTO-ENTMCNC: 33.5 G/DL (ref 31.5–36.5)
MCV RBC AUTO: 99 FL (ref 78–100)
NONHDLC SERPL-MCNC: 122 MG/DL
PLATELET # BLD AUTO: 219 10E3/UL (ref 150–450)
POTASSIUM SERPL-SCNC: 4.4 MMOL/L (ref 3.4–5.3)
RBC # BLD AUTO: 4.11 10E6/UL (ref 3.8–5.2)
SODIUM SERPL-SCNC: 143 MMOL/L (ref 135–145)
TRIGL SERPL-MCNC: 67 MG/DL
WBC # BLD AUTO: 5.7 10E3/UL (ref 4–11)

## 2025-07-29 PROCEDURE — 82465 ASSAY BLD/SERUM CHOLESTEROL: CPT | Mod: ORL | Performed by: INTERNAL MEDICINE

## 2025-07-29 PROCEDURE — 80048 BASIC METABOLIC PNL TOTAL CA: CPT | Mod: ORL | Performed by: INTERNAL MEDICINE

## 2025-07-29 PROCEDURE — 83718 ASSAY OF LIPOPROTEIN: CPT | Mod: ORL | Performed by: INTERNAL MEDICINE

## 2025-07-29 PROCEDURE — 82374 ASSAY BLOOD CARBON DIOXIDE: CPT | Mod: ORL | Performed by: INTERNAL MEDICINE

## 2025-07-29 NOTE — NURSING NOTE
"75 year old    Chief Complaint   Patient presents with    Pre-Op Exam        Blood pressure 129/78, pulse 96, temperature 97.5  F (36.4  C), temperature source Skin, resp. rate 15, height 1.676 m (5' 6\"), weight 80.3 kg (177 lb), SpO2 96%. Body mass index is 28.57 kg/m .    Patient Active Problem List   Diagnosis    Cervicalgia    Thyroid nodule    Primary osteoarthritis of knee    Seasonal allergic rhinitis    Rosacea    Varicose veins of both lower extremities with inflammation    Osteopenia of lumbar spine    Personal history of irradiation, presenting hazards to health    Cervical adenopathy    Elevated blood pressure reading    Other osteoarthritis involving multiple joints    Hyperlipidemia LDL goal <100    Muscle cramps    Left maxillary sinusitis          Wt Readings from Last 2 Encounters:   07/29/25 80.3 kg (177 lb)   06/04/25 82.1 kg (181 lb)       BP Readings from Last 3 Encounters:   07/29/25 129/78   07/18/25 135/87   06/04/25 138/84             Current Outpatient Medications   Medication Sig Dispense Refill    atorvastatin (LIPITOR) 20 MG tablet Take 1 tablet (20 mg) by mouth daily. Note change in dose 90 tablet 3    calcium carbonate 600 mg-vitamin D 400 units (CALTRATE) 600-400 MG-UNIT per tablet Take 1 tablet by mouth 2 times daily 180 tablet 3    celecoxib (CELEBREX) 200 MG capsule Take 1 capsule (200 mg) by mouth daily. 90 capsule 3    cetirizine (ZYRTEC) 10 MG tablet Take 1 tablet (10 mg) by mouth daily (Patient not taking: Reported on 7/29/2025) 90 tablet 3    cetirizine-pseudoePHEDrine ER (ZYRTEC-D) 5-120 MG 12 hr tablet Take 1 tablet by mouth 2 times daily.      DULoxetine (CYMBALTA) 30 MG capsule Take 2 daily for pain, rx from [facility]      metroNIDAZOLE (METROGEL) 1 % gel       Omega-3 Fatty Acids (FISH OIL PO)       oxymetazoline (AFRIN NASAL SPRAY) 0.05 % nasal spray Use 2 sprays to each nare two times daily for up to 3 days. 15 mL 0    sodium chloride (OCEAN) 0.65 % nasal spray Spray 1-2 " "sprays in nostril every hour as needed for congestion (nasal dryness). Use in EACH nostril. 44 mL 1    tretinoin (RETIN-A) 0.1 % cream Apply QHS to face for rosacea      VITAMIN D PO Take 4,000 Units by mouth daily       No current facility-administered medications for this visit.          Social History     Tobacco Use    Smoking status: Never    Smokeless tobacco: Never   Vaping Use    Vaping status: Never Used   Substance Use Topics    Alcohol use: Yes     Comment: 0-1 per day    Drug use: No        There are no preventive care reminders to display for this patient.     No results found for: \"PAP\"        July 29, 2025 9:05 AM        "

## 2025-08-26 ENCOUNTER — HOSPITAL ENCOUNTER (EMERGENCY)
Facility: CLINIC | Age: 75
Discharge: HOME | End: 2025-08-26
Attending: EMERGENCY MEDICINE | Admitting: EMERGENCY MEDICINE
Payer: MEDICARE

## 2025-08-26 ENCOUNTER — APPOINTMENT (OUTPATIENT)
Dept: CT IMAGING | Facility: CLINIC | Age: 75
End: 2025-08-26
Attending: EMERGENCY MEDICINE
Payer: MEDICARE

## 2025-08-26 ENCOUNTER — PATIENT OUTREACH (OUTPATIENT)
Dept: CARE COORDINATION | Facility: CLINIC | Age: 75
End: 2025-08-26
Payer: MEDICARE

## 2025-08-26 VITALS
RESPIRATION RATE: 18 BRPM | WEIGHT: 175 LBS | SYSTOLIC BLOOD PRESSURE: 145 MMHG | OXYGEN SATURATION: 97 % | HEART RATE: 100 BPM | TEMPERATURE: 97.6 F | DIASTOLIC BLOOD PRESSURE: 87 MMHG | BODY MASS INDEX: 27.47 KG/M2 | HEIGHT: 67 IN

## 2025-08-26 DIAGNOSIS — R55 VASOVAGAL SYNCOPE: Primary | ICD-10-CM

## 2025-08-26 DIAGNOSIS — R07.89 CHEST WALL PAIN: ICD-10-CM

## 2025-08-26 DIAGNOSIS — S09.90XA INJURY OF HEAD, INITIAL ENCOUNTER: ICD-10-CM

## 2025-08-26 DIAGNOSIS — M53.3 SACRAL PAIN: ICD-10-CM

## 2025-08-26 LAB
ATRIAL RATE - MUSE: 113 BPM
DIASTOLIC BLOOD PRESSURE - MUSE: NORMAL MMHG
INTERPRETATION ECG - MUSE: NORMAL
P AXIS - MUSE: 3 DEGREES
PR INTERVAL - MUSE: 150 MS
QRS DURATION - MUSE: 82 MS
QT - MUSE: 330 MS
QTC - MUSE: 452 MS
R AXIS - MUSE: 5 DEGREES
SYSTOLIC BLOOD PRESSURE - MUSE: NORMAL MMHG
T AXIS - MUSE: 21 DEGREES
VENTRICULAR RATE- MUSE: 113 BPM

## 2025-08-26 PROCEDURE — 70450 CT HEAD/BRAIN W/O DYE: CPT

## 2025-08-26 PROCEDURE — 93005 ELECTROCARDIOGRAM TRACING: CPT

## 2025-08-26 PROCEDURE — 99284 EMERGENCY DEPT VISIT MOD MDM: CPT | Mod: 25 | Performed by: EMERGENCY MEDICINE

## 2025-08-26 ASSESSMENT — COLUMBIA-SUICIDE SEVERITY RATING SCALE - C-SSRS
1. IN THE PAST MONTH, HAVE YOU WISHED YOU WERE DEAD OR WISHED YOU COULD GO TO SLEEP AND NOT WAKE UP?: NO
2. HAVE YOU ACTUALLY HAD ANY THOUGHTS OF KILLING YOURSELF IN THE PAST MONTH?: NO
6. HAVE YOU EVER DONE ANYTHING, STARTED TO DO ANYTHING, OR PREPARED TO DO ANYTHING TO END YOUR LIFE?: NO

## 2025-08-26 ASSESSMENT — ACTIVITIES OF DAILY LIVING (ADL)
ADLS_ACUITY_SCORE: 41
ADLS_ACUITY_SCORE: 41

## (undated) DEVICE — SYR 03ML LL W/O NDL 309657

## (undated) DEVICE — SOL WATER IRRIG 500ML BOTTLE 2F7113

## (undated) DEVICE — SUCTION MANIFOLD NEPTUNE 2 SYS 1 PORT 702-025-000

## (undated) DEVICE — TUBING IRRIGATOR STRAIGHTSHOT XPS 1895522

## (undated) DEVICE — ENDO SHEATH STORZ SHARPSITE ENDOSCRUB 0DEG 4MM 1912000

## (undated) DEVICE — TRACKER PATIENT NON-INVASIVE AXIEM 9734887XOM

## (undated) DEVICE — DRAPE WARMER 66X44" ORS-300

## (undated) DEVICE — ESU GROUND PAD ADULT W/CORD E7507

## (undated) DEVICE — SPONGE COTTONOID 1/2X3" 80-1407

## (undated) DEVICE — SPECIMEN CONTAINER 3OZ W/FORMALIN 59901

## (undated) DEVICE — KIT ENDO TURNOVER/PROCEDURE CARRY-ON 101822

## (undated) DEVICE — INSTRUMENT WIPE VISIWIPE 581047

## (undated) DEVICE — TUBING SUCTION MEDI-VAC 1/4"X20' N620A

## (undated) DEVICE — LINEN TOWEL PACK X5 5464

## (undated) DEVICE — TRACKER ENT OTS INSTRUMENT FUSION 9733533

## (undated) DEVICE — NDL 25GA 2"  8881200441

## (undated) DEVICE — SOL NACL 0.9% INJ 1000ML BAG 2B1324X

## (undated) DEVICE — PACK ENT ENDOSCOPY CUSTOM ASC

## (undated) DEVICE — BLADE QUADCUT ROTATABLE FUSION 4.3MMX13CM M4 1884380EM

## (undated) DEVICE — BLADE SINUS TRICUT STR 4MMX13CM FUSION ROTATE W/TRACKING

## (undated) DEVICE — GOWN IMPERVIOUS 2XL BLUE

## (undated) DEVICE — SOL NACL 0.9% IRRIG 500ML BOTTLE 2F7123

## (undated) DEVICE — SYR 30ML LL W/O NDL 302832

## (undated) DEVICE — DRSG HOLDER NASAL DALE 600

## (undated) DEVICE — STRAP KNEE/BODY 31143004

## (undated) DEVICE — SPECIMEN TRAP STRYKER NEPTUNE IN-LINE 0700-050-000

## (undated) DEVICE — GLOVE BIOGEL PI MICRO SZ 6.5 48565

## (undated) RX ORDER — DEXMEDETOMIDINE HYDROCHLORIDE 4 UG/ML
INJECTION, SOLUTION INTRAVENOUS
Status: DISPENSED
Start: 2025-05-02

## (undated) RX ORDER — OXYMETAZOLINE HYDROCHLORIDE 0.05 G/100ML
SPRAY NASAL
Status: DISPENSED
Start: 2025-05-02

## (undated) RX ORDER — HYDROMORPHONE HYDROCHLORIDE 1 MG/ML
INJECTION, SOLUTION INTRAMUSCULAR; INTRAVENOUS; SUBCUTANEOUS
Status: DISPENSED
Start: 2025-05-02

## (undated) RX ORDER — ONDANSETRON 2 MG/ML
INJECTION INTRAMUSCULAR; INTRAVENOUS
Status: DISPENSED
Start: 2025-05-02

## (undated) RX ORDER — FENTANYL CITRATE 50 UG/ML
INJECTION, SOLUTION INTRAMUSCULAR; INTRAVENOUS
Status: DISPENSED
Start: 2025-05-02

## (undated) RX ORDER — PROPOFOL 10 MG/ML
INJECTION, EMULSION INTRAVENOUS
Status: DISPENSED
Start: 2025-05-02

## (undated) RX ORDER — DEXAMETHASONE SODIUM PHOSPHATE 10 MG/ML
INJECTION, SOLUTION INTRAMUSCULAR; INTRAVENOUS
Status: DISPENSED
Start: 2025-05-02

## (undated) RX ORDER — LIDOCAINE HYDROCHLORIDE 10 MG/ML
INJECTION, SOLUTION EPIDURAL; INFILTRATION; INTRACAUDAL; PERINEURAL
Status: DISPENSED
Start: 2025-01-23

## (undated) RX ORDER — LIDOCAINE HYDROCHLORIDE AND EPINEPHRINE 10; 10 MG/ML; UG/ML
INJECTION, SOLUTION INFILTRATION; PERINEURAL
Status: DISPENSED
Start: 2025-05-02

## (undated) RX ORDER — EPINEPHRINE NASAL SOLUTION 1 MG/ML
SOLUTION NASAL
Status: DISPENSED
Start: 2025-05-02

## (undated) RX ORDER — LIDOCAINE HYDROCHLORIDE 10 MG/ML
INJECTION, SOLUTION EPIDURAL; INFILTRATION; INTRACAUDAL; PERINEURAL
Status: DISPENSED
Start: 2020-11-23

## (undated) RX ORDER — GLYCOPYRROLATE 0.2 MG/ML
INJECTION, SOLUTION INTRAMUSCULAR; INTRAVENOUS
Status: DISPENSED
Start: 2025-05-02